# Patient Record
Sex: MALE | Race: ASIAN | NOT HISPANIC OR LATINO | URBAN - METROPOLITAN AREA
[De-identification: names, ages, dates, MRNs, and addresses within clinical notes are randomized per-mention and may not be internally consistent; named-entity substitution may affect disease eponyms.]

---

## 2018-09-13 ENCOUNTER — EMERGENCY (EMERGENCY)
Facility: HOSPITAL | Age: 73
LOS: 0 days | Discharge: HOME | End: 2018-09-14
Attending: EMERGENCY MEDICINE | Admitting: EMERGENCY MEDICINE

## 2018-09-13 VITALS
TEMPERATURE: 97 F | HEART RATE: 66 BPM | RESPIRATION RATE: 18 BRPM | DIASTOLIC BLOOD PRESSURE: 70 MMHG | OXYGEN SATURATION: 96 % | SYSTOLIC BLOOD PRESSURE: 152 MMHG

## 2018-09-13 VITALS — HEIGHT: 66.14 IN | WEIGHT: 210.1 LBS

## 2018-09-13 DIAGNOSIS — Z98.890 OTHER SPECIFIED POSTPROCEDURAL STATES: ICD-10-CM

## 2018-09-13 DIAGNOSIS — R10.9 UNSPECIFIED ABDOMINAL PAIN: ICD-10-CM

## 2018-09-13 DIAGNOSIS — N23 UNSPECIFIED RENAL COLIC: ICD-10-CM

## 2018-09-13 DIAGNOSIS — I10 ESSENTIAL (PRIMARY) HYPERTENSION: ICD-10-CM

## 2018-09-13 LAB
APTT BLD: 33.4 SEC — SIGNIFICANT CHANGE UP (ref 27–39.2)
BASOPHILS # BLD AUTO: 0.05 K/UL — SIGNIFICANT CHANGE UP (ref 0–0.2)
BASOPHILS NFR BLD AUTO: 0.3 % — SIGNIFICANT CHANGE UP (ref 0–1)
EOSINOPHIL # BLD AUTO: 0.12 K/UL — SIGNIFICANT CHANGE UP (ref 0–0.7)
EOSINOPHIL NFR BLD AUTO: 0.8 % — SIGNIFICANT CHANGE UP (ref 0–8)
HCT VFR BLD CALC: 42 % — SIGNIFICANT CHANGE UP (ref 42–52)
HGB BLD-MCNC: 14.2 G/DL — SIGNIFICANT CHANGE UP (ref 14–18)
IMM GRANULOCYTES NFR BLD AUTO: 0.5 % — HIGH (ref 0.1–0.3)
INR BLD: 1.03 RATIO — SIGNIFICANT CHANGE UP (ref 0.65–1.3)
LYMPHOCYTES # BLD AUTO: 14.6 % — LOW (ref 20.5–51.1)
LYMPHOCYTES # BLD AUTO: 2.15 K/UL — SIGNIFICANT CHANGE UP (ref 1.2–3.4)
MCHC RBC-ENTMCNC: 27.4 PG — SIGNIFICANT CHANGE UP (ref 27–31)
MCHC RBC-ENTMCNC: 33.8 G/DL — SIGNIFICANT CHANGE UP (ref 32–37)
MCV RBC AUTO: 81.1 FL — SIGNIFICANT CHANGE UP (ref 80–94)
MONOCYTES # BLD AUTO: 0.59 K/UL — SIGNIFICANT CHANGE UP (ref 0.1–0.6)
MONOCYTES NFR BLD AUTO: 4 % — SIGNIFICANT CHANGE UP (ref 1.7–9.3)
NEUTROPHILS # BLD AUTO: 11.77 K/UL — HIGH (ref 1.4–6.5)
NEUTROPHILS NFR BLD AUTO: 79.8 % — HIGH (ref 42.2–75.2)
NRBC # BLD: 0 /100 WBCS — SIGNIFICANT CHANGE UP (ref 0–0)
PLATELET # BLD AUTO: 216 K/UL — SIGNIFICANT CHANGE UP (ref 130–400)
PROTHROM AB SERPL-ACNC: 11.1 SEC — SIGNIFICANT CHANGE UP (ref 9.95–12.87)
RBC # BLD: 5.18 M/UL — SIGNIFICANT CHANGE UP (ref 4.7–6.1)
RBC # FLD: 13.8 % — SIGNIFICANT CHANGE UP (ref 11.5–14.5)
WBC # BLD: 14.76 K/UL — HIGH (ref 4.8–10.8)
WBC # FLD AUTO: 14.76 K/UL — HIGH (ref 4.8–10.8)

## 2018-09-13 RX ORDER — MORPHINE SULFATE 50 MG/1
4 CAPSULE, EXTENDED RELEASE ORAL ONCE
Qty: 0 | Refills: 0 | Status: DISCONTINUED | OUTPATIENT
Start: 2018-09-13 | End: 2018-09-13

## 2018-09-13 RX ADMIN — MORPHINE SULFATE 4 MILLIGRAM(S): 50 CAPSULE, EXTENDED RELEASE ORAL at 23:00

## 2018-09-14 DIAGNOSIS — Z98.890 OTHER SPECIFIED POSTPROCEDURAL STATES: Chronic | ICD-10-CM

## 2018-09-14 LAB
ALBUMIN SERPL ELPH-MCNC: 4.7 G/DL — SIGNIFICANT CHANGE UP (ref 3.5–5.2)
ALP SERPL-CCNC: 60 U/L — SIGNIFICANT CHANGE UP (ref 30–115)
ALT FLD-CCNC: 30 U/L — SIGNIFICANT CHANGE UP (ref 0–41)
ANION GAP SERPL CALC-SCNC: 15 MMOL/L — HIGH (ref 7–14)
APPEARANCE UR: ABNORMAL
AST SERPL-CCNC: 25 U/L — SIGNIFICANT CHANGE UP (ref 0–41)
BILIRUB SERPL-MCNC: 0.5 MG/DL — SIGNIFICANT CHANGE UP (ref 0.2–1.2)
BILIRUB UR-MCNC: NEGATIVE — SIGNIFICANT CHANGE UP
BLD GP AB SCN SERPL QL: SIGNIFICANT CHANGE UP
BUN SERPL-MCNC: 23 MG/DL — HIGH (ref 10–20)
CALCIUM SERPL-MCNC: 9.2 MG/DL — SIGNIFICANT CHANGE UP (ref 8.5–10.1)
CHLORIDE SERPL-SCNC: 99 MMOL/L — SIGNIFICANT CHANGE UP (ref 98–110)
CO2 SERPL-SCNC: 26 MMOL/L — SIGNIFICANT CHANGE UP (ref 17–32)
COLOR SPEC: YELLOW — SIGNIFICANT CHANGE UP
CREAT SERPL-MCNC: 1.1 MG/DL — SIGNIFICANT CHANGE UP (ref 0.7–1.5)
DIFF PNL FLD: ABNORMAL
GLUCOSE SERPL-MCNC: 159 MG/DL — HIGH (ref 70–99)
GLUCOSE UR QL: NEGATIVE MG/DL — SIGNIFICANT CHANGE UP
KETONES UR-MCNC: NEGATIVE — SIGNIFICANT CHANGE UP
LEUKOCYTE ESTERASE UR-ACNC: NEGATIVE — SIGNIFICANT CHANGE UP
NITRITE UR-MCNC: NEGATIVE — SIGNIFICANT CHANGE UP
PH UR: 7.5 — SIGNIFICANT CHANGE UP (ref 5–8)
POTASSIUM SERPL-MCNC: 3.7 MMOL/L — SIGNIFICANT CHANGE UP (ref 3.5–5)
POTASSIUM SERPL-SCNC: 3.7 MMOL/L — SIGNIFICANT CHANGE UP (ref 3.5–5)
PROT SERPL-MCNC: 7.5 G/DL — SIGNIFICANT CHANGE UP (ref 6–8)
PROT UR-MCNC: NEGATIVE MG/DL — SIGNIFICANT CHANGE UP
RBC CASTS # UR COMP ASSIST: ABNORMAL /HPF
SODIUM SERPL-SCNC: 140 MMOL/L — SIGNIFICANT CHANGE UP (ref 135–146)
SP GR SPEC: 1.02 — SIGNIFICANT CHANGE UP (ref 1.01–1.03)
TYPE + AB SCN PNL BLD: SIGNIFICANT CHANGE UP
UROBILINOGEN FLD QL: 0.2 MG/DL — SIGNIFICANT CHANGE UP (ref 0.2–0.2)

## 2018-09-14 RX ORDER — TAMSULOSIN HYDROCHLORIDE 0.4 MG/1
1 CAPSULE ORAL
Qty: 7 | Refills: 0 | OUTPATIENT
Start: 2018-09-14 | End: 2018-09-20

## 2018-09-14 RX ORDER — TAMSULOSIN HYDROCHLORIDE 0.4 MG/1
0.4 CAPSULE ORAL AT BEDTIME
Qty: 0 | Refills: 0 | Status: DISCONTINUED | OUTPATIENT
Start: 2018-09-14 | End: 2018-09-14

## 2018-09-14 RX ORDER — KETOROLAC TROMETHAMINE 30 MG/ML
15 SYRINGE (ML) INJECTION ONCE
Qty: 0 | Refills: 0 | Status: COMPLETED | OUTPATIENT
Start: 2018-09-14 | End: 2018-09-14

## 2018-09-14 RX ORDER — IBUPROFEN 200 MG
1 TABLET ORAL
Qty: 15 | Refills: 0
Start: 2018-09-14

## 2018-09-14 RX ADMIN — TAMSULOSIN HYDROCHLORIDE 0.4 MILLIGRAM(S): 0.4 CAPSULE ORAL at 01:30

## 2018-09-14 NOTE — ED PROVIDER NOTE - OBJECTIVE STATEMENT
74 yo male hx of HTN and Aortic dissection present c/o sudden onset of left flank pain started around 8 pm. pain is sharp and localized to left lower flank. denies similar sxs in the past. denies injury and heavy lifting. denies dysuria/urgency/frequency. denies fever/chill/HA/dizziness/chest pain/palpitation/sob/abd pain/n/v/d/ black stool/bloody stool

## 2018-09-14 NOTE — ED PROVIDER NOTE - PHYSICAL EXAMINATION
CONSTITUTIONAL: Well-appearing; well-nourished; in no apparent distress.   EYES: PERRL; EOM intact.   ENT: normal nose; no rhinorrhea; normal pharynx with no tonsillar hypertrophy.   NECK: Supple; non-tender; no cervical lymphadenopathy. No JVD.   CARDIOVASCULAR: Normal S1, S2; no murmurs, rubs, or gallops.   RESPIRATORY: Normal chest excursion with respiration; breath sounds clear and equal bilaterally; no wheezes, rhonchi, or rales.  GI/: + left CVAT. Normal bowel sounds; non-distended; non-tender; no palpable organomegaly.   MS: No evidence of trauma or deformity. Non-tender to palpation. No scoliosis. Normal ROM in all four extremities; non-tender to palpation; distal pulses are normal.   SKIN: Normal for age and race; warm; dry; good turgor; no apparent lesions or exudate.   NEURO/PSYCH: A & O x 4; grossly unremarkable. mood and manner are appropriate. Grooming and personal hygiene are appropriate.

## 2018-09-14 NOTE — ED ADULT NURSE NOTE - NSIMPLEMENTINTERV_GEN_ALL_ED
Implemented All Universal Safety Interventions:  [place] to call system. Call bell, personal items and telephone within reach. Instruct patient to call for assistance. Room bathroom lighting operational. Non-slip footwear when patient is off stretcher. Physically safe environment: no spills, clutter or unnecessary equipment. Stretcher in lowest position, wheels locked, appropriate side rails in place.

## 2018-09-14 NOTE — ED PROVIDER NOTE - MEDICAL DECISION MAKING DETAILS
Patient presented with acute left flank pain. ruled out dissection given patient's hx, CTA did not show any significant changes. Patient is closely monitored as outpatient for this dissection. CTA did reveal a left renal stone. UA clean, creatinine unremarkable. Patient's pain controlled in ED. He agrees to follow up with his PMD. HD stable at discharge. Patient presented with acute left flank pain. ruled out new dissection given patient's hx, CTA did not show any significant changes. Patient is closely monitored as outpatient for this dissection. CTA did reveal a left renal stone. UA clean, creatinine unremarkable. Patient's pain controlled in ED. He agrees to follow up with his PMD. HD stable at discharge.

## 2018-09-14 NOTE — ED PROVIDER NOTE - ATTENDING CONTRIBUTION TO CARE
73 year old male, pmhx aortic dissection s/p repair (closely monitored for this), HTN, presenting with a 1 day history of left flank pain that has been worsening. Admits to some nausea but denies fevers, headache, vision changes, weakness/numbness, confusion, URI symptoms, neck pain, chest pain, dyspnea, cough, palpitations, vomiting, abdominal pain, diarrhea, constipation, blood in stool/dark stools, urinary symptoms, penile discharge/testicular pain, leg swelling, rash, recent travel or sick contacts.    Vital Signs: I have reviewed the initial vital signs.  Constitutional: NAD, well-nourished, appears stated age, no acute distress.  HEENT: Airway patent, moist MM, no erythema/swelling/deformity of oral structures. EOMI, PERRLA.  CV: regular rate, regular rhythm, well-perfused extremities, 2+ b/l DP and radial pulses equal.  Lungs: BCTA, no increased WOB.  ABD: NTND, no guarding or rebound, no pulsatile mass, no hernias.   MSK: Neck supple, nontender, nl ROM, no stepoff. Chest nontender. Back nontender in TLS spine or to b/l bony structures or flanks. Ext nontender, nl rom, no deformity.   INTEG: Skin warm, dry, no rash.  NEURO: A&Ox3, CN II-XII intact, normal strength 5/5 all 4 ext, nl sensation throughout, normal speech and coordination.  PSYCH: Calm, cooperative, normal affect and interaction.    Given patient's high risk for aortic dissection and sudden onset left flank pain, patient sent immediately to CTA to rule out dissection. Will also get blood work, urine, evaluate for stone on CT as well.

## 2018-09-14 NOTE — ED PROVIDER NOTE - NS ED ROS FT
Constitutional: no fever, chills, no recent weight loss, change in appetite or malaise  Eyes: no redness/discharge/pain/vision changes  ENT: no rhinorrhea/ear pain/sore throat  Cardiac: No chest pain, SOB or edema.  Respiratory: No cough or respiratory distress  GI: No nausea, vomiting, diarrhea or abdominal pain.  : + flank pain. No dysuria, frequency, urgency or hematuria  MS: no pain to back or extremities, no loss of ROM, no weakness  Neuro: No headache or weakness. No LOC.  Skin: No skin rash.  Endocrine: No history of thyroid disease or diabetes.  Except as documented in the HPI, all other systems are negative.

## 2023-05-12 ENCOUNTER — INPATIENT (INPATIENT)
Facility: HOSPITAL | Age: 78
LOS: 7 days | Discharge: HOME | DRG: 377 | End: 2023-05-20
Attending: INTERNAL MEDICINE | Admitting: INTERNAL MEDICINE
Payer: MEDICARE

## 2023-05-12 VITALS
RESPIRATION RATE: 17 BRPM | SYSTOLIC BLOOD PRESSURE: 97 MMHG | DIASTOLIC BLOOD PRESSURE: 54 MMHG | OXYGEN SATURATION: 96 % | TEMPERATURE: 98 F | HEART RATE: 71 BPM

## 2023-05-12 DIAGNOSIS — K92.2 GASTROINTESTINAL HEMORRHAGE, UNSPECIFIED: ICD-10-CM

## 2023-05-12 DIAGNOSIS — Z98.890 OTHER SPECIFIED POSTPROCEDURAL STATES: Chronic | ICD-10-CM

## 2023-05-12 LAB
ABO RH CONFIRMATION: SIGNIFICANT CHANGE UP
ALBUMIN SERPL ELPH-MCNC: 3.4 G/DL — LOW (ref 3.5–5.2)
ALBUMIN SERPL ELPH-MCNC: 3.5 G/DL — SIGNIFICANT CHANGE UP (ref 3.5–5.2)
ALP SERPL-CCNC: 51 U/L — SIGNIFICANT CHANGE UP (ref 30–115)
ALP SERPL-CCNC: 52 U/L — SIGNIFICANT CHANGE UP (ref 30–115)
ALT FLD-CCNC: 8 U/L — SIGNIFICANT CHANGE UP (ref 0–41)
ALT FLD-CCNC: 9 U/L — SIGNIFICANT CHANGE UP (ref 0–41)
ANION GAP SERPL CALC-SCNC: 10 MMOL/L — SIGNIFICANT CHANGE UP (ref 7–14)
ANION GAP SERPL CALC-SCNC: 8 MMOL/L — SIGNIFICANT CHANGE UP (ref 7–14)
APTT BLD: 29.5 SEC — SIGNIFICANT CHANGE UP (ref 27–39.2)
AST SERPL-CCNC: 11 U/L — SIGNIFICANT CHANGE UP (ref 0–41)
AST SERPL-CCNC: 11 U/L — SIGNIFICANT CHANGE UP (ref 0–41)
BASOPHILS # BLD AUTO: 0.03 K/UL — SIGNIFICANT CHANGE UP (ref 0–0.2)
BASOPHILS # BLD AUTO: 0.03 K/UL — SIGNIFICANT CHANGE UP (ref 0–0.2)
BASOPHILS # BLD AUTO: 0.04 K/UL — SIGNIFICANT CHANGE UP (ref 0–0.2)
BASOPHILS NFR BLD AUTO: 0.5 % — SIGNIFICANT CHANGE UP (ref 0–1)
BASOPHILS NFR BLD AUTO: 0.5 % — SIGNIFICANT CHANGE UP (ref 0–1)
BASOPHILS NFR BLD AUTO: 0.6 % — SIGNIFICANT CHANGE UP (ref 0–1)
BILIRUB SERPL-MCNC: 0.5 MG/DL — SIGNIFICANT CHANGE UP (ref 0.2–1.2)
BILIRUB SERPL-MCNC: 0.7 MG/DL — SIGNIFICANT CHANGE UP (ref 0.2–1.2)
BLD GP AB SCN SERPL QL: SIGNIFICANT CHANGE UP
BUN SERPL-MCNC: 33 MG/DL — HIGH (ref 10–20)
BUN SERPL-MCNC: 34 MG/DL — HIGH (ref 10–20)
CALCIUM SERPL-MCNC: 7.5 MG/DL — LOW (ref 8.4–10.5)
CALCIUM SERPL-MCNC: 8.1 MG/DL — LOW (ref 8.4–10.5)
CHLORIDE SERPL-SCNC: 105 MMOL/L — SIGNIFICANT CHANGE UP (ref 98–110)
CHLORIDE SERPL-SCNC: 110 MMOL/L — SIGNIFICANT CHANGE UP (ref 98–110)
CO2 SERPL-SCNC: 21 MMOL/L — SIGNIFICANT CHANGE UP (ref 17–32)
CO2 SERPL-SCNC: 25 MMOL/L — SIGNIFICANT CHANGE UP (ref 17–32)
CREAT SERPL-MCNC: 0.8 MG/DL — SIGNIFICANT CHANGE UP (ref 0.7–1.5)
CREAT SERPL-MCNC: 1 MG/DL — SIGNIFICANT CHANGE UP (ref 0.7–1.5)
EGFR: 78 ML/MIN/1.73M2 — SIGNIFICANT CHANGE UP
EGFR: 92 ML/MIN/1.73M2 — SIGNIFICANT CHANGE UP
EOSINOPHIL # BLD AUTO: 0.03 K/UL — SIGNIFICANT CHANGE UP (ref 0–0.7)
EOSINOPHIL # BLD AUTO: 0.06 K/UL — SIGNIFICANT CHANGE UP (ref 0–0.7)
EOSINOPHIL # BLD AUTO: 0.23 K/UL — SIGNIFICANT CHANGE UP (ref 0–0.7)
EOSINOPHIL NFR BLD AUTO: 0.4 % — SIGNIFICANT CHANGE UP (ref 0–8)
EOSINOPHIL NFR BLD AUTO: 1 % — SIGNIFICANT CHANGE UP (ref 0–8)
EOSINOPHIL NFR BLD AUTO: 3.5 % — SIGNIFICANT CHANGE UP (ref 0–8)
ETHANOL SERPL-MCNC: <10 MG/DL — SIGNIFICANT CHANGE UP
GLUCOSE SERPL-MCNC: 109 MG/DL — HIGH (ref 70–99)
GLUCOSE SERPL-MCNC: 120 MG/DL — HIGH (ref 70–99)
HCT VFR BLD CALC: 25.1 % — LOW (ref 42–52)
HCT VFR BLD CALC: 27.2 % — LOW (ref 42–52)
HCT VFR BLD CALC: 27.4 % — LOW (ref 42–52)
HCT VFR BLD CALC: 28.5 % — LOW (ref 42–52)
HGB BLD-MCNC: 7.3 G/DL — LOW (ref 14–18)
HGB BLD-MCNC: 8.4 G/DL — LOW (ref 14–18)
HGB BLD-MCNC: 8.6 G/DL — LOW (ref 14–18)
HGB BLD-MCNC: 8.6 G/DL — LOW (ref 14–18)
IMM GRANULOCYTES NFR BLD AUTO: 0.3 % — SIGNIFICANT CHANGE UP (ref 0.1–0.3)
IMM GRANULOCYTES NFR BLD AUTO: 0.3 % — SIGNIFICANT CHANGE UP (ref 0.1–0.3)
IMM GRANULOCYTES NFR BLD AUTO: 0.5 % — HIGH (ref 0.1–0.3)
INR BLD: 1.25 RATIO — SIGNIFICANT CHANGE UP (ref 0.65–1.3)
LACTATE SERPL-SCNC: 0.9 MMOL/L — SIGNIFICANT CHANGE UP (ref 0.7–2)
LIDOCAIN IGE QN: 45 U/L — SIGNIFICANT CHANGE UP (ref 7–60)
LYMPHOCYTES # BLD AUTO: 0.88 K/UL — LOW (ref 1.2–3.4)
LYMPHOCYTES # BLD AUTO: 0.93 K/UL — LOW (ref 1.2–3.4)
LYMPHOCYTES # BLD AUTO: 1.56 K/UL — SIGNIFICANT CHANGE UP (ref 1.2–3.4)
LYMPHOCYTES # BLD AUTO: 12.7 % — LOW (ref 20.5–51.1)
LYMPHOCYTES # BLD AUTO: 15.8 % — LOW (ref 20.5–51.1)
LYMPHOCYTES # BLD AUTO: 23.6 % — SIGNIFICANT CHANGE UP (ref 20.5–51.1)
MAGNESIUM SERPL-MCNC: 1.9 MG/DL — SIGNIFICANT CHANGE UP (ref 1.8–2.4)
MCHC RBC-ENTMCNC: 20 PG — LOW (ref 27–31)
MCHC RBC-ENTMCNC: 21.9 PG — LOW (ref 27–31)
MCHC RBC-ENTMCNC: 22.2 PG — LOW (ref 27–31)
MCHC RBC-ENTMCNC: 22.6 PG — LOW (ref 27–31)
MCHC RBC-ENTMCNC: 29.1 G/DL — LOW (ref 32–37)
MCHC RBC-ENTMCNC: 30.2 G/DL — LOW (ref 32–37)
MCHC RBC-ENTMCNC: 30.9 G/DL — LOW (ref 32–37)
MCHC RBC-ENTMCNC: 31.4 G/DL — LOW (ref 32–37)
MCV RBC AUTO: 68.8 FL — LOW (ref 80–94)
MCV RBC AUTO: 72 FL — LOW (ref 80–94)
MCV RBC AUTO: 72.1 FL — LOW (ref 80–94)
MCV RBC AUTO: 72.7 FL — LOW (ref 80–94)
MONOCYTES # BLD AUTO: 0.46 K/UL — SIGNIFICANT CHANGE UP (ref 0.1–0.6)
MONOCYTES # BLD AUTO: 0.53 K/UL — SIGNIFICANT CHANGE UP (ref 0.1–0.6)
MONOCYTES # BLD AUTO: 0.58 K/UL — SIGNIFICANT CHANGE UP (ref 0.1–0.6)
MONOCYTES NFR BLD AUTO: 7.7 % — SIGNIFICANT CHANGE UP (ref 1.7–9.3)
MONOCYTES NFR BLD AUTO: 7.8 % — SIGNIFICANT CHANGE UP (ref 1.7–9.3)
MONOCYTES NFR BLD AUTO: 8.8 % — SIGNIFICANT CHANGE UP (ref 1.7–9.3)
NEUTROPHILS # BLD AUTO: 4.19 K/UL — SIGNIFICANT CHANGE UP (ref 1.4–6.5)
NEUTROPHILS # BLD AUTO: 4.39 K/UL — SIGNIFICANT CHANGE UP (ref 1.4–6.5)
NEUTROPHILS # BLD AUTO: 5.41 K/UL — SIGNIFICANT CHANGE UP (ref 1.4–6.5)
NEUTROPHILS NFR BLD AUTO: 63.1 % — SIGNIFICANT CHANGE UP (ref 42.2–75.2)
NEUTROPHILS NFR BLD AUTO: 74.6 % — SIGNIFICANT CHANGE UP (ref 42.2–75.2)
NEUTROPHILS NFR BLD AUTO: 78.3 % — HIGH (ref 42.2–75.2)
NRBC # BLD: 0 /100 WBCS — SIGNIFICANT CHANGE UP (ref 0–0)
NT-PROBNP SERPL-SCNC: 364 PG/ML — HIGH (ref 0–300)
PLATELET # BLD AUTO: 178 K/UL — SIGNIFICANT CHANGE UP (ref 130–400)
PLATELET # BLD AUTO: 178 K/UL — SIGNIFICANT CHANGE UP (ref 130–400)
PLATELET # BLD AUTO: 190 K/UL — SIGNIFICANT CHANGE UP (ref 130–400)
PLATELET # BLD AUTO: 207 K/UL — SIGNIFICANT CHANGE UP (ref 130–400)
PMV BLD: 9.1 FL — SIGNIFICANT CHANGE UP (ref 7.4–10.4)
PMV BLD: 9.2 FL — SIGNIFICANT CHANGE UP (ref 7.4–10.4)
PMV BLD: 9.3 FL — SIGNIFICANT CHANGE UP (ref 7.4–10.4)
PMV BLD: 9.3 FL — SIGNIFICANT CHANGE UP (ref 7.4–10.4)
POTASSIUM SERPL-MCNC: 3.6 MMOL/L — SIGNIFICANT CHANGE UP (ref 3.5–5)
POTASSIUM SERPL-MCNC: 4.1 MMOL/L — SIGNIFICANT CHANGE UP (ref 3.5–5)
POTASSIUM SERPL-SCNC: 3.6 MMOL/L — SIGNIFICANT CHANGE UP (ref 3.5–5)
POTASSIUM SERPL-SCNC: 4.1 MMOL/L — SIGNIFICANT CHANGE UP (ref 3.5–5)
PROT SERPL-MCNC: 5.4 G/DL — LOW (ref 6–8)
PROT SERPL-MCNC: 5.5 G/DL — LOW (ref 6–8)
PROTHROM AB SERPL-ACNC: 14.4 SEC — HIGH (ref 9.95–12.87)
RBC # BLD: 3.65 M/UL — LOW (ref 4.7–6.1)
RBC # BLD: 3.78 M/UL — LOW (ref 4.7–6.1)
RBC # BLD: 3.8 M/UL — LOW (ref 4.7–6.1)
RBC # BLD: 3.92 M/UL — LOW (ref 4.7–6.1)
RBC # FLD: 19 % — HIGH (ref 11.5–14.5)
RBC # FLD: 21.6 % — HIGH (ref 11.5–14.5)
RBC # FLD: 21.7 % — HIGH (ref 11.5–14.5)
RBC # FLD: 21.9 % — HIGH (ref 11.5–14.5)
SODIUM SERPL-SCNC: 139 MMOL/L — SIGNIFICANT CHANGE UP (ref 135–146)
SODIUM SERPL-SCNC: 140 MMOL/L — SIGNIFICANT CHANGE UP (ref 135–146)
TROPONIN T SERPL-MCNC: <0.01 NG/ML — SIGNIFICANT CHANGE UP
TSH SERPL-MCNC: 1.3 UIU/ML — SIGNIFICANT CHANGE UP (ref 0.27–4.2)
WBC # BLD: 5.24 K/UL — SIGNIFICANT CHANGE UP (ref 4.8–10.8)
WBC # BLD: 5.89 K/UL — SIGNIFICANT CHANGE UP (ref 4.8–10.8)
WBC # BLD: 6.62 K/UL — SIGNIFICANT CHANGE UP (ref 4.8–10.8)
WBC # BLD: 6.91 K/UL — SIGNIFICANT CHANGE UP (ref 4.8–10.8)
WBC # FLD AUTO: 5.24 K/UL — SIGNIFICANT CHANGE UP (ref 4.8–10.8)
WBC # FLD AUTO: 5.89 K/UL — SIGNIFICANT CHANGE UP (ref 4.8–10.8)
WBC # FLD AUTO: 6.62 K/UL — SIGNIFICANT CHANGE UP (ref 4.8–10.8)
WBC # FLD AUTO: 6.91 K/UL — SIGNIFICANT CHANGE UP (ref 4.8–10.8)

## 2023-05-12 PROCEDURE — 71275 CT ANGIOGRAPHY CHEST: CPT | Mod: 26,MA

## 2023-05-12 PROCEDURE — 83880 ASSAY OF NATRIURETIC PEPTIDE: CPT

## 2023-05-12 PROCEDURE — 99291 CRITICAL CARE FIRST HOUR: CPT

## 2023-05-12 PROCEDURE — 80053 COMPREHEN METABOLIC PANEL: CPT

## 2023-05-12 PROCEDURE — 86803 HEPATITIS C AB TEST: CPT

## 2023-05-12 PROCEDURE — 93970 EXTREMITY STUDY: CPT

## 2023-05-12 PROCEDURE — 36415 COLL VENOUS BLD VENIPUNCTURE: CPT

## 2023-05-12 PROCEDURE — 84145 PROCALCITONIN (PCT): CPT

## 2023-05-12 PROCEDURE — 83605 ASSAY OF LACTIC ACID: CPT

## 2023-05-12 PROCEDURE — 85027 COMPLETE CBC AUTOMATED: CPT

## 2023-05-12 PROCEDURE — 82962 GLUCOSE BLOOD TEST: CPT

## 2023-05-12 PROCEDURE — 86901 BLOOD TYPING SEROLOGIC RH(D): CPT

## 2023-05-12 PROCEDURE — 84484 ASSAY OF TROPONIN QUANT: CPT

## 2023-05-12 PROCEDURE — 99221 1ST HOSP IP/OBS SF/LOW 40: CPT

## 2023-05-12 PROCEDURE — 93010 ELECTROCARDIOGRAM REPORT: CPT

## 2023-05-12 PROCEDURE — 84443 ASSAY THYROID STIM HORMONE: CPT

## 2023-05-12 PROCEDURE — 94760 N-INVAS EAR/PLS OXIMETRY 1: CPT

## 2023-05-12 PROCEDURE — 70486 CT MAXILLOFACIAL W/O DYE: CPT | Mod: 26,MA

## 2023-05-12 PROCEDURE — 72170 X-RAY EXAM OF PELVIS: CPT | Mod: 26

## 2023-05-12 PROCEDURE — 71045 X-RAY EXAM CHEST 1 VIEW: CPT

## 2023-05-12 PROCEDURE — 99223 1ST HOSP IP/OBS HIGH 75: CPT

## 2023-05-12 PROCEDURE — 99222 1ST HOSP IP/OBS MODERATE 55: CPT

## 2023-05-12 PROCEDURE — 71045 X-RAY EXAM CHEST 1 VIEW: CPT | Mod: 26

## 2023-05-12 PROCEDURE — 97162 PT EVAL MOD COMPLEX 30 MIN: CPT | Mod: GP

## 2023-05-12 PROCEDURE — 86923 COMPATIBILITY TEST ELECTRIC: CPT

## 2023-05-12 PROCEDURE — 72125 CT NECK SPINE W/O DYE: CPT | Mod: 26,MA

## 2023-05-12 PROCEDURE — 80048 BASIC METABOLIC PNL TOTAL CA: CPT

## 2023-05-12 PROCEDURE — 86850 RBC ANTIBODY SCREEN: CPT

## 2023-05-12 PROCEDURE — 85730 THROMBOPLASTIN TIME PARTIAL: CPT

## 2023-05-12 PROCEDURE — 93306 TTE W/DOPPLER COMPLETE: CPT

## 2023-05-12 PROCEDURE — 86900 BLOOD TYPING SEROLOGIC ABO: CPT

## 2023-05-12 PROCEDURE — 83735 ASSAY OF MAGNESIUM: CPT

## 2023-05-12 PROCEDURE — 85025 COMPLETE CBC W/AUTO DIFF WBC: CPT

## 2023-05-12 PROCEDURE — C9113: CPT

## 2023-05-12 PROCEDURE — 94640 AIRWAY INHALATION TREATMENT: CPT

## 2023-05-12 PROCEDURE — 93306 TTE W/DOPPLER COMPLETE: CPT | Mod: 26

## 2023-05-12 PROCEDURE — 70450 CT HEAD/BRAIN W/O DYE: CPT | Mod: 26,MA

## 2023-05-12 PROCEDURE — 74174 CTA ABD&PLVS W/CONTRAST: CPT | Mod: 26,MA

## 2023-05-12 RX ORDER — PANTOPRAZOLE SODIUM 20 MG/1
8 TABLET, DELAYED RELEASE ORAL
Qty: 80 | Refills: 0 | Status: DISCONTINUED | OUTPATIENT
Start: 2023-05-12 | End: 2023-05-12

## 2023-05-12 RX ORDER — ONDANSETRON 8 MG/1
4 TABLET, FILM COATED ORAL ONCE
Refills: 0 | Status: COMPLETED | OUTPATIENT
Start: 2023-05-12 | End: 2023-05-12

## 2023-05-12 RX ORDER — FERROUS SULFATE 325(65) MG
325 TABLET ORAL DAILY
Refills: 0 | Status: DISCONTINUED | OUTPATIENT
Start: 2023-05-12 | End: 2023-05-20

## 2023-05-12 RX ORDER — SPIRONOLACTONE 25 MG/1
12.5 TABLET, FILM COATED ORAL DAILY
Refills: 0 | Status: DISCONTINUED | OUTPATIENT
Start: 2023-05-12 | End: 2023-05-12

## 2023-05-12 RX ORDER — ATORVASTATIN CALCIUM 80 MG/1
20 TABLET, FILM COATED ORAL AT BEDTIME
Refills: 0 | Status: DISCONTINUED | OUTPATIENT
Start: 2023-05-12 | End: 2023-05-20

## 2023-05-12 RX ORDER — TAMSULOSIN HYDROCHLORIDE 0.4 MG/1
0.4 CAPSULE ORAL AT BEDTIME
Refills: 0 | Status: DISCONTINUED | OUTPATIENT
Start: 2023-05-12 | End: 2023-05-20

## 2023-05-12 RX ORDER — PROTHROMBIN COMPLEX CONCENTRATE (HUMAN) 25.5; 16.5; 24; 22; 22; 26 [IU]/ML; [IU]/ML; [IU]/ML; [IU]/ML; [IU]/ML; [IU]/ML
2500 POWDER, FOR SOLUTION INTRAVENOUS ONCE
Refills: 0 | Status: DISCONTINUED | OUTPATIENT
Start: 2023-05-12 | End: 2023-05-12

## 2023-05-12 RX ORDER — PANTOPRAZOLE SODIUM 20 MG/1
40 TABLET, DELAYED RELEASE ORAL EVERY 12 HOURS
Refills: 0 | Status: DISCONTINUED | OUTPATIENT
Start: 2023-05-12 | End: 2023-05-18

## 2023-05-12 RX ORDER — MONTELUKAST 4 MG/1
10 TABLET, CHEWABLE ORAL DAILY
Refills: 0 | Status: DISCONTINUED | OUTPATIENT
Start: 2023-05-12 | End: 2023-05-20

## 2023-05-12 RX ORDER — METOCLOPRAMIDE HCL 10 MG
10 TABLET ORAL ONCE
Refills: 0 | Status: COMPLETED | OUTPATIENT
Start: 2023-05-12 | End: 2023-05-12

## 2023-05-12 RX ORDER — SODIUM CHLORIDE 9 MG/ML
1000 INJECTION, SOLUTION INTRAVENOUS
Refills: 0 | Status: DISCONTINUED | OUTPATIENT
Start: 2023-05-12 | End: 2023-05-15

## 2023-05-12 RX ORDER — OCTREOTIDE ACETATE 200 UG/ML
8 INJECTION, SOLUTION INTRAVENOUS; SUBCUTANEOUS
Qty: 500 | Refills: 0 | Status: DISCONTINUED | OUTPATIENT
Start: 2023-05-12 | End: 2023-05-12

## 2023-05-12 RX ORDER — FUROSEMIDE 40 MG
20 TABLET ORAL DAILY
Refills: 0 | Status: DISCONTINUED | OUTPATIENT
Start: 2023-05-12 | End: 2023-05-12

## 2023-05-12 RX ORDER — ERYTHROMYCIN ETHYLSUCCINATE 400 MG
1000 TABLET ORAL ONCE
Refills: 0 | Status: DISCONTINUED | OUTPATIENT
Start: 2023-05-12 | End: 2023-05-12

## 2023-05-12 RX ORDER — PANTOPRAZOLE SODIUM 20 MG/1
80 TABLET, DELAYED RELEASE ORAL ONCE
Refills: 0 | Status: COMPLETED | OUTPATIENT
Start: 2023-05-12 | End: 2023-05-12

## 2023-05-12 RX ORDER — SACUBITRIL AND VALSARTAN 24; 26 MG/1; MG/1
1 TABLET, FILM COATED ORAL
Refills: 0 | Status: DISCONTINUED | OUTPATIENT
Start: 2023-05-12 | End: 2023-05-12

## 2023-05-12 RX ORDER — CEFTRIAXONE 500 MG/1
2000 INJECTION, POWDER, FOR SOLUTION INTRAMUSCULAR; INTRAVENOUS ONCE
Refills: 0 | Status: COMPLETED | OUTPATIENT
Start: 2023-05-12 | End: 2023-05-12

## 2023-05-12 RX ADMIN — TAMSULOSIN HYDROCHLORIDE 0.4 MILLIGRAM(S): 0.4 CAPSULE ORAL at 22:51

## 2023-05-12 RX ADMIN — ONDANSETRON 4 MILLIGRAM(S): 8 TABLET, FILM COATED ORAL at 01:27

## 2023-05-12 RX ADMIN — SODIUM CHLORIDE 75 MILLILITER(S): 9 INJECTION, SOLUTION INTRAVENOUS at 10:00

## 2023-05-12 RX ADMIN — PANTOPRAZOLE SODIUM 40 MILLIGRAM(S): 20 TABLET, DELAYED RELEASE ORAL at 09:20

## 2023-05-12 RX ADMIN — Medication 200 MILLIGRAM(S): at 22:52

## 2023-05-12 RX ADMIN — OCTREOTIDE ACETATE 1.6 MICROGRAM(S)/HR: 200 INJECTION, SOLUTION INTRAVENOUS; SUBCUTANEOUS at 02:47

## 2023-05-12 RX ADMIN — PANTOPRAZOLE SODIUM 40 MILLIGRAM(S): 20 TABLET, DELAYED RELEASE ORAL at 17:22

## 2023-05-12 RX ADMIN — Medication 200 MILLIGRAM(S): at 17:22

## 2023-05-12 RX ADMIN — Medication 104 MILLIGRAM(S): at 01:27

## 2023-05-12 RX ADMIN — Medication 325 MILLIGRAM(S): at 11:18

## 2023-05-12 RX ADMIN — Medication 200 MILLIGRAM(S): at 11:18

## 2023-05-12 RX ADMIN — PANTOPRAZOLE SODIUM 10 MG/HR: 20 TABLET, DELAYED RELEASE ORAL at 02:47

## 2023-05-12 RX ADMIN — PANTOPRAZOLE SODIUM 80 MILLIGRAM(S): 20 TABLET, DELAYED RELEASE ORAL at 01:03

## 2023-05-12 RX ADMIN — CEFTRIAXONE 100 MILLIGRAM(S): 500 INJECTION, POWDER, FOR SOLUTION INTRAMUSCULAR; INTRAVENOUS at 02:47

## 2023-05-12 RX ADMIN — Medication 10 MILLIGRAM(S): at 01:06

## 2023-05-12 RX ADMIN — ONDANSETRON 4 MILLIGRAM(S): 8 TABLET, FILM COATED ORAL at 01:06

## 2023-05-12 RX ADMIN — ATORVASTATIN CALCIUM 20 MILLIGRAM(S): 80 TABLET, FILM COATED ORAL at 22:51

## 2023-05-12 RX ADMIN — MONTELUKAST 10 MILLIGRAM(S): 4 TABLET, CHEWABLE ORAL at 11:18

## 2023-05-12 NOTE — H&P ADULT - NSICDXPASTMEDICALHX_GEN_ALL_CORE_FT
PAST MEDICAL HISTORY:  Asthma     CAD (coronary artery disease)     Chronic systolic congestive heart failure     Hypertension     S/P CABG x 1     Unspecified atrial fibrillation

## 2023-05-12 NOTE — CONSULT NOTE ADULT - ASSESSMENT
78y [place]/cantonese speaking Male w/ PMHx of afib on Eliquis and HTN (rest of history unclear) presented to the ED as Trauma Alert s/p syncopal fall. Per a nurse translating in the ED, the patient became suddenly dizzy yesterday evening and had a syncopal episode.     Vascular surgery consulted for CT finding of type B aortic dissection. History noted as above; difficult to obtain full history due to language barrier. Per nurse translating in ED, patient states that he gets dizzy whenever his blood pressure is low. He takes HTN medications, but states his blood pressure is labile, going as low as SBP of 60s and as high as SBP of 140s. Denies any history of blood clots or vascular disease. States he has had bypass surgery many years ago but does not recall for what. Denies any pain in the extremities or discoloration. Palpable peripheral pulses. .   Of note, patient has been having episodes of hemoptysis in the ED, Hgb is 7.8, received 2u PRBCs.     PLAN:  - Recommend close monitoring in ICU setting for BP management  - Strict BP control - SBP goal of <140  - No acute surgical intervention at this time   - Hold all anticoagulation in setting of possible upper GI bleed  - Recommend GI c/s for possible GI bleed  - Imaging and case discussed with vascular fellow  - Attending to see    VASCULAR SURGERY SPECTRA: [id number] 78y [place]/cantonese speaking Male w/ PMHx of afib on Eliquis and HTN (rest of history unclear) presented to the ED as Trauma Alert s/p syncopal fall. Per a nurse translating in the ED, the patient became suddenly dizzy yesterday evening and had a syncopal episode.     Vascular surgery consulted for CT finding of type B aortic dissection. History noted as above; difficult to obtain full history due to language barrier. Per nurse translating in ED, patient states that he gets dizzy whenever his blood pressure is low. He takes HTN medications, but states his blood pressure is labile, going as low as SBP of 60s and as high as SBP of 140s. Denies any history of blood clots or vascular disease. States he has had bypass surgery many years ago but does not recall for what. Denies any pain in the extremities or discoloration. Palpable peripheral pulses. .   Of note, patient has been having episodes of hemoptysis in the ED, Hgb is 7.8, received 2u PRBCs.     PLAN:  - Recommend close monitoring in ICU setting for BP management  - Strict BP control - SBP goal of <140  - No acute surgical intervention at this time   - Hold all anticoagulation in setting of possible upper GI bleed  - Recommend GI c/s for possible GI bleed  - Imaging and case discussed with vascular fellow  - Attending to see      5/12 @ 9AM:  Pt seen in AM with attending Dr. Carlton. Spoke to pt and his grandson. We gave information to pt for follow up in the office. His grandson will make an appointment to see Dr. Carlton    VASCULAR SURGERY SPECTRA: [id number]

## 2023-05-12 NOTE — PROGRESS NOTE ADULT - SUBJECTIVE AND OBJECTIVE BOX
Internal Medicine Progress Note    Location: [place] [address] [address]  Patient Name: ZAID DU  MRN: [id number]    Patient is a 78y old  Male who presents with a chief complaint of upper GI bleed (12 May 2023 11:12)      Subjective  Patient reports doing well, denies any new symptoms/complaints      Objective  Vital Signs Last 24 Hrs  T(C): 36.3 (12 May 2023 08:00), Max: 36.7 (12 May 2023 06:45)  T(F): 97.4 (12 May 2023 08:00), Max: 98 (12 May 2023 06:45)  HR: 76 (12 May 2023 13:00) (71 - 86)  BP: 138/63 (12 May 2023 08:00) (88/53 - 138/63)  BP(mean): 91 (12 May 2023 08:00) (91 - 91)  RR: 22 (12 May 2023 13:00) (17 - 23)  SpO2: 97% (12 May 2023 13:00) (93% - 99%)    Parameters below as of 12 May 2023 12:00  Patient On (Oxygen Delivery Method): room air        Intake/output   05-12-23 @ 07:01  -  05-12-23 @ 16:47  --------------------------------------------------------  IN: 600 mL / OUT: 400 mL / NET: 200 mL        Physical Exam  General: nontoxic, NAD  Neck: supple  Chest/lung: nonlabored respirations on RA, CTAB  Heart: RRR, +S1 S2  Abdomen: soft, nontender, nondistended  Extremities: no clubbing/cyanosis   Neuro: no gross focal deficits  Psych: awake alert     Labs  CBC:                       8.6    5.89  )-----------( 178      ( 12 May 2023 12:20 )             27.4     BMP: 05-12    139  |  110  |  34<H>  ----------------------------<  109<H>  4.1   |  21  |  0.8    Ca    7.5<L>      12 May 2023 07:05  Mg     1.9     05-12    TPro  5.4<L>  /  Alb  3.4<L>  /  TBili  0.7  /  DBili  x   /  AST  11  /  ALT  8   /  AlkPhos  51  05-12    Coag: PT/INR - ( 12 May 2023 00:50 )   PT: 14.40 sec;   INR: 1.25 ratio         PTT - ( 12 May 2023 00:50 )  PTT:29.5 sec  ABG:   Cardiac markers: CARDIAC MARKERS ( 12 May 2023 12:20 )  x     / <0.01 ng/mL / x     / x     / x      CARDIAC MARKERS ( 12 May 2023 07:05 )  x     / <0.01 ng/mL / x     / x     / x      CARDIAC MARKERS ( 12 May 2023 00:50 )  x     / <0.01 ng/mL / x     / x     / x            Micro:        Imaging:    Standing Medications  MEDICATIONS  (STANDING):  atorvastatin 20 milliGRAM(s) Oral at bedtime  benzonatate 200 milliGRAM(s) Oral three times a day  ferrous    sulfate 325 milliGRAM(s) Oral daily  lactated ringers. 1000 milliLiter(s) (75 mL/Hr) IV Continuous <Continuous>  montelukast 10 milliGRAM(s) Oral daily  pantoprazole  Injectable 40 milliGRAM(s) IV Push every 12 hours  tamsulosin 0.4 milliGRAM(s) Oral at bedtime    PRN Medications  MEDICATIONS  (PRN):

## 2023-05-12 NOTE — CONSULT NOTE ADULT - SUBJECTIVE AND OBJECTIVE BOX
Surgeon: Dr. Grant/Av/Nadiya    Consult requesting by:    HISTORY OF PRESENT ILLNESS:  HPI:  77yo M hx of afib on eliquis, HFrEF (unknown EF), CAD s/p CABG (8 years ago), HTN, asthma presented to the hospital for syncopal episode after feeling dizzy yesterday evening.    Patient speaks a dialect similar to Cantonese but is a village dialect. Patient is able to understand Cantonese, but 100%  will not be able to understand the patient's language. I understood 100% of patient's conversation.     Patient was feeling dizzy while walking to the bathroom at 10PM  followed by a fall. +LOC, + AC. Unsure if he had head trauma, but does not experience any headaches. Unwitnessed by family. Denies fevers, shortness of breath, chest pain, abdominal pain, diarrhea, constipation, dysuria.     Upon arrival to the ED, patient was complaining of having productive sputum needing to cough, epistaxis, and followed by ~50cc of hematemesis. Brought into hospital via EMS    At the ED, trauma alert was activated due to the fall. Dried blood found in the nares. VS was T-97.8F, BP: 97/54, HR: 71bpm, RR: 17bpm, SpO2: 96% on RA. After episode of hemoptysis, patient's SBP dropped from 89-->85. 2u prbc were ordered resulting in improvement of BP. Labs significant for microcytic anemia (Hb- 7.3), BUN- 33. troponin<0.01 x1.     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  CTA Abd+Pelvis: no acute pathology  Xray Pelvis: no acute fracture    Patient admitted to ICU for Upper GI bleed. Currently, VS stable: with SBP 110s. Given 2u prbc. started octrotide and protonix infusion. s/p ceftriaxone and erythromycin IV. given K centra 5g. Vascular surgery consulted for aortic dissection. No acute surgical intervention, Recommend keep -140 SBP.  per ED signout. GI consutled for GI Bleed. Plan for colonoscopy in today.    (12 May 2023 04:16)      NYHA functional class    [ ] Class I (no limitation) [ ] Class II (slight limitation) [ ] Class III (marked limitation) [ ] Class IV (symptoms at rest)    CCS grading of Angina Pectoris:  [ ] Class I	        Angina only during strenuous or prolonged physical activity  [ ] Class II	Slight limitation, with angina only during vigorous physical activity  [ ] Class III	Symptoms with everyday living activities, ie, moderate limitation  [ ] Class IV	Inability to perform any activity without angina or angina at rest, ie, severe limitation      PAST MEDICAL & SURGICAL HISTORY:  Hypertension      CAD (coronary artery disease)      S/P CABG x 1      Unspecified atrial fibrillation      Chronic systolic congestive heart failure      Asthma          MEDICATIONS  (STANDING):  atorvastatin 20 milliGRAM(s) Oral at bedtime  benzonatate 200 milliGRAM(s) Oral three times a day  ferrous    sulfate 325 milliGRAM(s) Oral daily  lactated ringers. 1000 milliLiter(s) (75 mL/Hr) IV Continuous <Continuous>  montelukast 10 milliGRAM(s) Oral daily  pantoprazole  Injectable 40 milliGRAM(s) IV Push every 12 hours  tamsulosin 0.4 milliGRAM(s) Oral at bedtime    MEDICATIONS  (PRN):    Antiplatelet therapy:                           Last dose/amt:    Allergies    No Known Allergies    Intolerances        SOCIAL HISTORY:  Smoker: [ ] Yes  [ ] No        PACK YEARS:                         WHEN QUIT?  ETOH use: [ ] Yes  [ ] No              FREQUENCY / QUANTITY:  Ilicit Drug use:  [ ] Yes  [ ] No  Occupation:  Lives with:  Assisted device use:  5 meter walk test: 1____sec, 2____sec, 3___sec  FAMILY HISTORY:      Review of Systems  CONSTITUTIONAL:  Fevers[ ] chills[ ] sweats[ ] fatigue[ ] weight loss[ ] weight gain [ ]            NEGATIVE [X ]   NEURO:  parathesias[ ] seizures [ ]  syncope [ ]  confusion [ ]                                                       NEGATIVE[ X]   EYES: glasses[ ]  blurry vision[ ]  discharge[ ] pain[ ] glaucoma [ ]                                                 NEGATIVE[X ]   ENMT:  difficulty hearing [ ]  vertigo[ ]  dysphagia[ ] epistaxis[ ] recent dental work [ ]           NEGATIVE[ X]   CV:  chest pain[ ] palpitations[ ] PARR [ ] diaphoresis [ ]                                                                  NEGATIVE[ X]   RESPIRATORY:  wheezing[ ] SOB[ ] cough [ ] sputum[ ] hemoptysis[ ]                                          NEGATIVE[ ]   GI:  nausea[ ]  vomiting [ ]  diarrhea[ ] constipation [ ] melena [ ]                                             NEGATIVE[ X]   : hematuria[ ]  dysuria[ ] urgency[ ] incontinence[ ]                                                                   NEGATIVE[ X]   MUSCULOSKELETAL:  arthritis[ ]  joint swelling [ ] muscle weakness [ ] Hx vein stripping [ ]   NEGATIVE[X ]   SKIN/BREAST:  rash[ ] itching [ ]  hair loss[ ] masses[ ]                                                                    NEGATIVE[ X]   PSYCH:  dementia [ ] depression [ ] anxiety[ ]                                                                                     NEGATIVE[X ]   HEME/LYMPH:  bruises easily[ ] enlarged lymph nodes[ ] tender lymph nodes[ ]                     NEGATIVE[ X]   ENDOCRINE:  cold intolerance[ ] heat intolerance[ ] polydipsia[ ]                                                NEGATIVE[ X]     PHYSICAL EXAM  Vital Signs Last 24 Hrs  T(C): 36.7 (12 May 2023 06:45), Max: 36.7 (12 May 2023 06:45)  T(F): 98 (12 May 2023 06:45), Max: 98 (12 May 2023 06:45)  HR: 79 (12 May 2023 06:45) (71 - 86)  BP: 127/71 (12 May 2023 05:30) (88/53 - 136/64)  BP(mean): --  RR: 21 (12 May 2023 06:45) (17 - 21)  SpO2: 97% (12 May 2023 08:31) (95% - 99%)    Parameters below as of 12 May 2023 08:31  Patient On (Oxygen Delivery Method): room air      Right arm bp:                       Left arm bp:    CONSTITUTIONAL:  WNL[ ]   Neuro: WNL[ ] Normal exam oriented to person/place & time with no focal motor or sensory  deficits. Other                     Eyes: WNL[ ]   Normal exam of conjunctiva & lids, pupils equally reactive. Other     ENT: WNL[ ]    Normal exam of nasal/oral mucosa with absence of cyanosis. Other  Neck: WNL[ ]  Normal exam of jugular veins, trachea & thyroid. Other  Chest: WNL[ ] Normal lung exam with good air movement absence of wheezes, rales or Ronchi                                                                             CV:  Auscultation: normal [ ] S3[ ] S4[ ] Irregular [ ] Rub[ ] Clicks[ ]    Murmurs none:[ ]systolic [ ]  diastolic [ ] holosystolic [ ]  Carotids: No Bruits[ ] Other                 Abdominal Aorta: normal [ ] nonpalpable[ ]Other                                                                                      GI: WNL[ ] Normal exam of abdomen, liver & spleen with no noted masses or tenderness. Other                                                                                                        Extremities: WNL[ ] Normal no evidence of cyanosis or deformity Edema: none[ ]trace[ ]1+[ ]2+[ ]3+[ ]4+[ ]  Lower Extremity Pulses: Right[ ] Left[ ]Varicosities[ ]  SKIN :WNL[ ] Normal exam to inspection & palpation. Other:                                                          LABS:                        8.6    6.91  )-----------( 190      ( 12 May 2023 07:05 )             28.5     05-12    139  |  110  |  34<H>  ----------------------------<  109<H>  4.1   |  21  |  0.8    Ca    7.5<L>      12 May 2023 07:05  Mg     1.9     05-12    TPro  5.4<L>  /  Alb  3.4<L>  /  TBili  0.7  /  DBili  x   /  AST  11  /  ALT  8   /  AlkPhos  51  05-12    PT/INR - ( 12 May 2023 00:50 )   PT: 14.40 sec;   INR: 1.25 ratio         PTT - ( 12 May 2023 00:50 )  PTT:29.5 sec    CARDIAC MARKERS ( 12 May 2023 07:05 )  x     / <0.01 ng/mL / x     / x     / x      CARDIAC MARKERS ( 12 May 2023 00:50 )  x     / <0.01 ng/mL / x     / x     / x              Covid Results:     Cardiac Cath:    TTE / JARETT:    Recommendation: (Procedures/Evaluations)  CT HEAD Non-Contrast:[  ]  CT Chest without contrast [ X ]  Echocadiography :[ X ]  Carotid Duplex :[ X ]  PFT : Simple PFT [ X ]  Full [ ]  OPHELIA/PVR: [ ]  Renal Consult [ ]  Pulmonary Consult: [ ]   Vascular Consult [ ]    Dental Consult [ ]   Hem-Onc Consult [ ]   GI Consult [ ]   Other Consultations :    STS Risk Score:  Discussed with patient/HCP [x ] Yes  [ ] No    Impression:    CAD [ ]  Valvular  disease [ ]   Aortic Disease [ ]   MARIAN: Yes[ ] No [ ]   CKD Stage I [ ] , Stage II [ ] , Stage III [ ], Stage IV [ ]   Anemia: Yes [ ], No [ ]  Diabetes :Yes [ ], No [ ]  Acute MI: Yes [ ], No [ ]   Heart Failure: Yes [ ] , No [ ] HFpEF [ ], HFrEF [ ]        Assessment/ Plan:                Surgeon: Dr. Grant/Av/Nadiya    Consult requesting by:    HISTORY OF PRESENT ILLNESS:  HPI:  77yo M hx of afib on eliquis, HFrEF (unknown EF), CAD s/p CABG (8 years ago), HTN, asthma presented to the hospital for syncopal episode after feeling dizzy yesterday evening.    Patient speaks a dialect similar to Cantonese but is a village dialect. Patient is able to understand Cantonese, but 100%  will not be able to understand the patient's language. I understood 100% of patient's conversation.     Patient was feeling dizzy while walking to the bathroom at 10PM  followed by a fall. +LOC, + AC. Unsure if he had head trauma, but does not experience any headaches. Unwitnessed by family. Denies fevers, shortness of breath, chest pain, abdominal pain, diarrhea, constipation, dysuria.     Upon arrival to the ED, patient was complaining of having productive sputum needing to cough, epistaxis, and followed by ~50cc of hematemesis. Brought into hospital via EMS    At the ED, trauma alert was activated due to the fall. Dried blood found in the nares. VS was T-97.8F, BP: 97/54, HR: 71bpm, RR: 17bpm, SpO2: 96% on RA. After episode of hemoptysis, patient's SBP dropped from 89-->85. 2u prbc were ordered resulting in improvement of BP. Labs significant for microcytic anemia (Hb- 7.3), BUN- 33. troponin<0.01 x1.     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  CTA Abd+Pelvis: no acute pathology  Xray Pelvis: no acute fracture    Patient admitted to ICU for Upper GI bleed. Currently, VS stable: with SBP 110s. Given 2u prbc. started octrotide and protonix infusion. s/p ceftriaxone and erythromycin IV. given K centra 5g. Vascular surgery consulted for aortic dissection. No acute surgical intervention, Recommend keep -140 SBP.  per ED signout. GI consutled for GI Bleed. Plan for colonoscopy in today.    (12 May 2023 04:16)      NYHA functional class    [ ] Class I (no limitation) [ ] Class II (slight limitation) [ ] Class III (marked limitation) [ ] Class IV (symptoms at rest)    CCS grading of Angina Pectoris:  [ ] Class I	        Angina only during strenuous or prolonged physical activity  [ ] Class II	Slight limitation, with angina only during vigorous physical activity  [ ] Class III	Symptoms with everyday living activities, ie, moderate limitation  [ ] Class IV	Inability to perform any activity without angina or angina at rest, ie, severe limitation      PAST MEDICAL & SURGICAL HISTORY:  Hypertension      CAD (coronary artery disease)      S/P CABG x 1      Unspecified atrial fibrillation      Chronic systolic congestive heart failure      Asthma          MEDICATIONS  (STANDING):  atorvastatin 20 milliGRAM(s) Oral at bedtime  benzonatate 200 milliGRAM(s) Oral three times a day  ferrous    sulfate 325 milliGRAM(s) Oral daily  lactated ringers. 1000 milliLiter(s) (75 mL/Hr) IV Continuous <Continuous>  montelukast 10 milliGRAM(s) Oral daily  pantoprazole  Injectable 40 milliGRAM(s) IV Push every 12 hours  tamsulosin 0.4 milliGRAM(s) Oral at bedtime    MEDICATIONS  (PRN):    Antiplatelet therapy:                           Last dose/amt:    Allergies    No Known Allergies    Intolerances        SOCIAL HISTORY:  Smoker: [ ] Yes  [ ] No        PACK YEARS:                         WHEN QUIT?  ETOH use: [ ] Yes  [ ] No              FREQUENCY / QUANTITY:  Ilicit Drug use:  [ ] Yes  [ ] No  Occupation:  Lives with:  Assisted device use:  5 meter walk test: 1____sec, 2____sec, 3___sec  FAMILY HISTORY:      Review of Systems  CONSTITUTIONAL:  Fevers[ ] chills[ ] sweats[ ] fatigue[ ] weight loss[ ] weight gain [ ]            NEGATIVE [X ]   NEURO:  parathesias[ ] seizures [ ]  syncope [ ]  confusion [ ]                                                       NEGATIVE[ X]   EYES: glasses[ ]  blurry vision[ ]  discharge[ ] pain[ ] glaucoma [ ]                                                 NEGATIVE[X ]   ENMT:  difficulty hearing [ ]  vertigo[ ]  dysphagia[ ] epistaxis[ ] recent dental work [ ]           NEGATIVE[ X]   CV:  chest pain[ ] palpitations[ ] PARR [ ] diaphoresis [ ]                                                                  NEGATIVE[ X]   RESPIRATORY:  wheezing[ ] SOB[ ] cough [ ] sputum[ ] hemoptysis[ ]                                          NEGATIVE[ ]   GI:  nausea[ ]  vomiting [ ]  diarrhea[ ] constipation [ ] melena [ ]                                             NEGATIVE[ X]   : hematuria[ ]  dysuria[ ] urgency[ ] incontinence[ ]                                                                   NEGATIVE[ X]   MUSCULOSKELETAL:  arthritis[ ]  joint swelling [ ] muscle weakness [ ] Hx vein stripping [ ]   NEGATIVE[X ]   SKIN/BREAST:  rash[ ] itching [ ]  hair loss[ ] masses[ ]                                                                    NEGATIVE[ X]   PSYCH:  dementia [ ] depression [ ] anxiety[ ]                                                                                     NEGATIVE[X ]   HEME/LYMPH:  bruises easily[ ] enlarged lymph nodes[ ] tender lymph nodes[ ]                     NEGATIVE[ X]   ENDOCRINE:  cold intolerance[ ] heat intolerance[ ] polydipsia[ ]                                                NEGATIVE[ X]     PHYSICAL EXAM  Vital Signs Last 24 Hrs  T(C): 36.7 (12 May 2023 06:45), Max: 36.7 (12 May 2023 06:45)  T(F): 98 (12 May 2023 06:45), Max: 98 (12 May 2023 06:45)  HR: 79 (12 May 2023 06:45) (71 - 86)  BP: 127/71 (12 May 2023 05:30) (88/53 - 136/64)  BP(mean): --  RR: 21 (12 May 2023 06:45) (17 - 21)  SpO2: 97% (12 May 2023 08:31) (95% - 99%)    Parameters below as of 12 May 2023 08:31  Patient On (Oxygen Delivery Method): room air      Right arm bp:                       Left arm bp:    CONSTITUTIONAL:  WNL[ ]   Neuro: WNL[ ] Normal exam oriented to person/place & time with no focal motor or sensory  deficits. Other                     Eyes: WNL[ ]   Normal exam of conjunctiva & lids, pupils equally reactive. Other     ENT: WNL[ ]    Normal exam of nasal/oral mucosa with absence of cyanosis. Other  Neck: WNL[ ]  Normal exam of jugular veins, trachea & thyroid. Other  Chest: WNL[ ] Normal lung exam with good air movement absence of wheezes, rales or Ronchi                                                                             CV:  Auscultation: normal [ ] S3[ ] S4[ ] Irregular [ ] Rub[ ] Clicks[ ]    Murmurs none:[ ]systolic [ ]  diastolic [ ] holosystolic [ ]  Carotids: No Bruits[ ] Other                 Abdominal Aorta: normal [ ] nonpalpable[ ]Other                                                                                      GI: WNL[ ] Normal exam of abdomen, liver & spleen with no noted masses or tenderness. Other                                                                                                        Extremities: WNL[ ] Normal no evidence of cyanosis or deformity Edema: none[ ]trace[ ]1+[ ]2+[ ]3+[ ]4+[ ]  Lower Extremity Pulses: Right[ ] Left[ ]Varicosities[ ]  SKIN :WNL[ ] Normal exam to inspection & palpation. Other:                                                          LABS:                        8.6    6.91  )-----------( 190      ( 12 May 2023 07:05 )             28.5     05-12    139  |  110  |  34<H>  ----------------------------<  109<H>  4.1   |  21  |  0.8    Ca    7.5<L>      12 May 2023 07:05  Mg     1.9     05-12    TPro  5.4<L>  /  Alb  3.4<L>  /  TBili  0.7  /  DBili  x   /  AST  11  /  ALT  8   /  AlkPhos  51  05-12    PT/INR - ( 12 May 2023 00:50 )   PT: 14.40 sec;   INR: 1.25 ratio         PTT - ( 12 May 2023 00:50 )  PTT:29.5 sec    CARDIAC MARKERS ( 12 May 2023 07:05 )  x     / <0.01 ng/mL / x     / x     / x      CARDIAC MARKERS ( 12 May 2023 00:50 )  x     / <0.01 ng/mL / x     / x     / x              Covid Results:     Cardiac Cath:    TTE / JARETT:    Recommendation: (Procedures/Evaluations)  CT HEAD Non-Contrast:[  ]  CT Chest without contrast [ X ]  Echocadiography :[ X ]  Carotid Duplex :[ X ]  PFT : Simple PFT [ X ]  Full [ ]  OPHELIA/PVR: [ ]  Renal Consult [ ]  Pulmonary Consult: [ ]   Vascular Consult [ ]    Dental Consult [ ]   Hem-Onc Consult [ ]   GI Consult [ ]   Other Consultations :    STS Risk Score:  Discussed with patient/HCP [x ] Yes  [ ] No    Impression:    CAD [ ]  Valvular  disease [ ]   Aortic Disease [ ]   MARIAN: Yes[ ] No [ ]   CKD Stage I [ ] , Stage II [ ] , Stage III [ ], Stage IV [ ]   Anemia: Yes [ ], No [ ]  Diabetes :Yes [ ], No [ ]  Acute MI: Yes [ ], No [ ]   Heart Failure: Yes [ ] , No [ ] HFpEF [ ], HFrEF [ ]        Assessment/ Plan:                 Surgeon: Dr. Grant/Av/Nadiya    Consult requesting by: ED Physician     HISTORY OF PRESENT ILLNESS:  79yo M hx of afib on eliquis, HFrEF (unknown EF), CAD s/p CABG (8 years ago), HTN, asthma presented to the hospital for syncopal episode after feeling dizzy yesterday evening.    Patient speaks a dialect similar to Cantonese but is a village dialect. Patient is able to understand Cantonese, but 100%  will not be able to understand the patient's language. I understood 100% of patient's conversation.     Patient was feeling dizzy while walking to the bathroom at 10PM  followed by a fall. +LOC, + AC. Unsure if he had head trauma, but does not experience any headaches. Unwitnessed by family. Denies fevers, shortness of breath, chest pain, abdominal pain, diarrhea, constipation, dysuria.     Upon arrival to the ED, patient was complaining of having productive sputum needing to cough, epistaxis, and followed by ~50cc of hematemesis. Brought into hospital via EMS    At the ED, trauma alert was activated due to the fall. Dried blood found in the nares. VS was T-97.8F, BP: 97/54, HR: 71bpm, RR: 17bpm, SpO2: 96% on RA. After episode of hemoptysis, patient's SBP dropped from 89-->85. 2u prbc were ordered resulting in improvement of BP. Labs significant for microcytic anemia (Hb- 7.3), BUN- 33. troponin<0.01 x1.     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  CTA Abd+Pelvis: no acute pathology  Xray Pelvis: no acute fracture    Patient admitted to ICU for Upper GI bleed. Currently, VS stable: with SBP 110s. Given 2u prbc. started octrotide and protonix infusion. s/p ceftriaxone and erythromycin IV. given K centra 5g. Vascular surgery consulted for aortic dissection. No acute surgical intervention, Recommend keep -140 SBP.  per ED signout. GI consutled for GI Bleed. Plan for colonoscopy in today.    (12 May 2023 04:16)    CT Surgery was consulted to evaluate Aortic Dissection.    NYHA functional class    [x ] Class I (no limitation) [ ] Class II (slight limitation) [ ] Class III (marked limitation) [ ] Class IV (symptoms at rest)    CCS grading of Angina Pectoris: - No ANGINA   [ ] Class I	        Angina only during strenuous or prolonged physical activity  [ ] Class II	Slight limitation, with angina only during vigorous physical activity  [ ] Class III	Symptoms with everyday living activities, ie, moderate limitation  [ ] Class IV	Inability to perform any activity without angina or angina at rest, ie, severe limitation      PAST MEDICAL & SURGICAL HISTORY:  Hypertension      CAD (coronary artery disease)      S/P CABG x 1      Unspecified atrial fibrillation      Chronic systolic congestive heart failure      Asthma          MEDICATIONS  (STANDING):  atorvastatin 20 milliGRAM(s) Oral at bedtime  benzonatate 200 milliGRAM(s) Oral three times a day  ferrous    sulfate 325 milliGRAM(s) Oral daily  lactated ringers. 1000 milliLiter(s) (75 mL/Hr) IV Continuous <Continuous>  montelukast 10 milliGRAM(s) Oral daily  pantoprazole  Injectable 40 milliGRAM(s) IV Push every 12 hours  tamsulosin 0.4 milliGRAM(s) Oral at bedtime    Allergies    No Known Allergies        Review of Systems  10-system review is otherwise negative except HPI above.      PHYSICAL EXAM  Vital Signs Last 24 Hrs  T(C): 36.7 (12 May 2023 06:45), Max: 36.7 (12 May 2023 06:45)  T(F): 98 (12 May 2023 06:45), Max: 98 (12 May 2023 06:45)  HR: 79 (12 May 2023 06:45) (71 - 86)  BP: 127/71 (12 May 2023 05:30) (88/53 - 136/64)  BP(mean): --  RR: 21 (12 May 2023 06:45) (17 - 21)  SpO2: 97% (12 May 2023 08:31) (95% - 99%)    Parameters below as of 12 May 2023 08:31  Patient On (Oxygen Delivery Method): room air    CONSTITUTIONAL:  Ill appearing. Well nourished.  NAD    ENT:   Airway patent,   No thrush    EYES:   Clear bilaterally,   pupils equal,   round and reactive to light.    CARDIAC:   Normal rate,   regular rhythm.    no edema    RESPIRATORY:   No wheezing  Normal chest expansion  Not tachypneic,  No use of accessory muscles    GASTROINTESTINAL:  Abdomen soft,   non-tender,   no guarding,   + BS    MUSCULOSKELETAL:   range of motion is not limited,  no clubbing, cyanosis    NEUROLOGICAL:   Alert  Confused  no motor deficits.    SKIN:   Skin normal color for race,                                                           LABS:                        8.6    6.91  )-----------( 190      ( 12 May 2023 07:05 )             28.5     05-12    139  |  110  |  34<H>  ----------------------------<  109<H>  4.1   |  21  |  0.8    Ca    7.5<L>      12 May 2023 07:05  Mg     1.9     05-12    TPro  5.4<L>  /  Alb  3.4<L>  /  TBili  0.7  /  DBili  x   /  AST  11  /  ALT  8   /  AlkPhos  51  05-12    PT/INR - ( 12 May 2023 00:50 )   PT: 14.40 sec;   INR: 1.25 ratio         PTT - ( 12 May 2023 00:50 )  PTT:29.5 sec    CARDIAC MARKERS ( 12 May 2023 07:05 )  x     / <0.01 ng/mL / x     / x     / x      CARDIAC MARKERS ( 12 May 2023 00:50 )  x     / <0.01 ng/mL / x     / x     / x                Surgeon: Dr. Grant/Av/Nadiya    Consult requesting by:    HISTORY OF PRESENT ILLNESS:  HPI:  79yo M hx of afib on eliquis, HFrEF (unknown EF), CAD s/p CABG (8 years ago), HTN, asthma presented to the hospital for syncopal episode after feeling dizzy yesterday evening.    Patient speaks a dialect similar to Cantonese but is a village dialect. Patient is able to understand Cantonese, but 100%  will not be able to understand the patient's language. I understood 100% of patient's conversation.     Patient was feeling dizzy while walking to the bathroom at 10PM  followed by a fall. +LOC, + AC. Unsure if he had head trauma, but does not experience any headaches. Unwitnessed by family. Denies fevers, shortness of breath, chest pain, abdominal pain, diarrhea, constipation, dysuria.     Upon arrival to the ED, patient was complaining of having productive sputum needing to cough, epistaxis, and followed by ~50cc of hematemesis. Brought into hospital via EMS    At the ED, trauma alert was activated due to the fall. Dried blood found in the nares. VS was T-97.8F, BP: 97/54, HR: 71bpm, RR: 17bpm, SpO2: 96% on RA. After episode of hemoptysis, patient's SBP dropped from 89-->85. 2u prbc were ordered resulting in improvement of BP. Labs significant for microcytic anemia (Hb- 7.3), BUN- 33. troponin<0.01 x1.     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  CTA Abd+Pelvis: no acute pathology  Xray Pelvis: no acute fracture    Patient admitted to ICU for Upper GI bleed. Currently, VS stable: with SBP 110s. Given 2u prbc. started octrotide and protonix infusion. s/p ceftriaxone and erythromycin IV. given K centra 5g. Vascular surgery consulted for aortic dissection. No acute surgical intervention, Recommend keep -140 SBP.  per ED signout. GI consutled for GI Bleed. Plan for colonoscopy in today.    (12 May 2023 04:16)      NYHA functional class    [ ] Class I (no limitation) [ ] Class II (slight limitation) [ ] Class III (marked limitation) [ ] Class IV (symptoms at rest)    CCS grading of Angina Pectoris:  [ ] Class I	        Angina only during strenuous or prolonged physical activity  [ ] Class II	Slight limitation, with angina only during vigorous physical activity  [ ] Class III	Symptoms with everyday living activities, ie, moderate limitation  [ ] Class IV	Inability to perform any activity without angina or angina at rest, ie, severe limitation      PAST MEDICAL & SURGICAL HISTORY:  Hypertension      CAD (coronary artery disease)      S/P CABG x 1      Unspecified atrial fibrillation      Chronic systolic congestive heart failure      Asthma          MEDICATIONS  (STANDING):  atorvastatin 20 milliGRAM(s) Oral at bedtime  benzonatate 200 milliGRAM(s) Oral three times a day  ferrous    sulfate 325 milliGRAM(s) Oral daily  lactated ringers. 1000 milliLiter(s) (75 mL/Hr) IV Continuous <Continuous>  montelukast 10 milliGRAM(s) Oral daily  pantoprazole  Injectable 40 milliGRAM(s) IV Push every 12 hours  tamsulosin 0.4 milliGRAM(s) Oral at bedtime    MEDICATIONS  (PRN):    Antiplatelet therapy:                           Last dose/amt:    Allergies    No Known Allergies    Intolerances        SOCIAL HISTORY:  Smoker: [ ] Yes  [ ] No        PACK YEARS:                         WHEN QUIT?  ETOH use: [ ] Yes  [ ] No              FREQUENCY / QUANTITY:  Ilicit Drug use:  [ ] Yes  [ ] No  Occupation:  Lives with:  Assisted device use:  5 meter walk test: 1____sec, 2____sec, 3___sec  FAMILY HISTORY:      Review of Systems  CONSTITUTIONAL:  Fevers[ ] chills[ ] sweats[ ] fatigue[ ] weight loss[ ] weight gain [ ]            NEGATIVE [X ]   NEURO:  parathesias[ ] seizures [ ]  syncope [ ]  confusion [ ]                                                       NEGATIVE[ X]   EYES: glasses[ ]  blurry vision[ ]  discharge[ ] pain[ ] glaucoma [ ]                                                 NEGATIVE[X ]   ENMT:  difficulty hearing [ ]  vertigo[ ]  dysphagia[ ] epistaxis[ ] recent dental work [ ]           NEGATIVE[ X]   CV:  chest pain[ ] palpitations[ ] PARR [ ] diaphoresis [ ]                                                                  NEGATIVE[ X]   RESPIRATORY:  wheezing[ ] SOB[ ] cough [ ] sputum[ ] hemoptysis[ ]                                          NEGATIVE[ ]   GI:  nausea[ ]  vomiting [ ]  diarrhea[ ] constipation [ ] melena [ ]                                             NEGATIVE[ X]   : hematuria[ ]  dysuria[ ] urgency[ ] incontinence[ ]                                                                   NEGATIVE[ X]   MUSCULOSKELETAL:  arthritis[ ]  joint swelling [ ] muscle weakness [ ] Hx vein stripping [ ]   NEGATIVE[X ]   SKIN/BREAST:  rash[ ] itching [ ]  hair loss[ ] masses[ ]                                                                    NEGATIVE[ X]   PSYCH:  dementia [ ] depression [ ] anxiety[ ]                                                                                     NEGATIVE[X ]   HEME/LYMPH:  bruises easily[ ] enlarged lymph nodes[ ] tender lymph nodes[ ]                     NEGATIVE[ X]   ENDOCRINE:  cold intolerance[ ] heat intolerance[ ] polydipsia[ ]                                                NEGATIVE[ X]     PHYSICAL EXAM  Vital Signs Last 24 Hrs  T(C): 36.7 (12 May 2023 06:45), Max: 36.7 (12 May 2023 06:45)  T(F): 98 (12 May 2023 06:45), Max: 98 (12 May 2023 06:45)  HR: 79 (12 May 2023 06:45) (71 - 86)  BP: 127/71 (12 May 2023 05:30) (88/53 - 136/64)  BP(mean): --  RR: 21 (12 May 2023 06:45) (17 - 21)  SpO2: 97% (12 May 2023 08:31) (95% - 99%)    Parameters below as of 12 May 2023 08:31  Patient On (Oxygen Delivery Method): room air      Right arm bp:                       Left arm bp:    CONSTITUTIONAL:  WNL[ ]   Neuro: WNL[ ] Normal exam oriented to person/place & time with no focal motor or sensory  deficits. Other                     Eyes: WNL[ ]   Normal exam of conjunctiva & lids, pupils equally reactive. Other     ENT: WNL[ ]    Normal exam of nasal/oral mucosa with absence of cyanosis. Other  Neck: WNL[ ]  Normal exam of jugular veins, trachea & thyroid. Other  Chest: WNL[ ] Normal lung exam with good air movement absence of wheezes, rales or Ronchi                                                                             CV:  Auscultation: normal [ ] S3[ ] S4[ ] Irregular [ ] Rub[ ] Clicks[ ]    Murmurs none:[ ]systolic [ ]  diastolic [ ] holosystolic [ ]  Carotids: No Bruits[ ] Other                 Abdominal Aorta: normal [ ] nonpalpable[ ]Other                                                                                      GI: WNL[ ] Normal exam of abdomen, liver & spleen with no noted masses or tenderness. Other                                                                                                        Extremities: WNL[ ] Normal no evidence of cyanosis or deformity Edema: none[ ]trace[ ]1+[ ]2+[ ]3+[ ]4+[ ]  Lower Extremity Pulses: Right[ ] Left[ ]Varicosities[ ]  SKIN :WNL[ ] Normal exam to inspection & palpation. Other:                                                          LABS:                        8.6    6.91  )-----------( 190      ( 12 May 2023 07:05 )             28.5     05-12    139  |  110  |  34<H>  ----------------------------<  109<H>  4.1   |  21  |  0.8    Ca    7.5<L>      12 May 2023 07:05  Mg     1.9     05-12    TPro  5.4<L>  /  Alb  3.4<L>  /  TBili  0.7  /  DBili  x   /  AST  11  /  ALT  8   /  AlkPhos  51  05-12    PT/INR - ( 12 May 2023 00:50 )   PT: 14.40 sec;   INR: 1.25 ratio         PTT - ( 12 May 2023 00:50 )  PTT:29.5 sec    CARDIAC MARKERS ( 12 May 2023 07:05 )  x     / <0.01 ng/mL / x     / x     / x      CARDIAC MARKERS ( 12 May 2023 00:50 )  x     / <0.01 ng/mL / x     / x     / x              Covid Results:     Cardiac Cath:    TTE / JARETT:    Recommendation: (Procedures/Evaluations)  CT HEAD Non-Contrast:[  ]  CT Chest without contrast [ X ]  Echocadiography :[ X ]  Carotid Duplex :[ X ]  PFT : Simple PFT [ X ]  Full [ ]  OPHELIA/PVR: [ ]  Renal Consult [ ]  Pulmonary Consult: [ ]   Vascular Consult [ ]    Dental Consult [ ]   Hem-Onc Consult [ ]   GI Consult [ ]   Other Consultations :    STS Risk Score:  Discussed with patient/HCP [x ] Yes  [ ] No    Impression:    CAD [ ]  Valvular  disease [ ]   Aortic Disease [ ]   MARIAN: Yes[ ] No [ ]   CKD Stage I [ ] , Stage II [ ] , Stage III [ ], Stage IV [ ]   Anemia: Yes [ ], No [ ]  Diabetes :Yes [ ], No [ ]  Acute MI: Yes [ ], No [ ]   Heart Failure: Yes [ ] , No [ ] HFpEF [ ], HFrEF [ ]        Assessment/ Plan:

## 2023-05-12 NOTE — H&P ADULT - HISTORY OF PRESENT ILLNESS
77yo M hx of afib on eliquis, HTN presented to the hospital for syncopal episode after feeling dizzy yesterday evening occuring at the bathroom followed by a fall. +LOC, + AC. Unwitnessed by family. Upon arrival to the ED, patient was complaining of throat pain, epistaxis, and followed by ~50cc of hematemesis. Brought into hospital via EMS    At the ED, trauma alert was activated due to the fall. Dried blood found in the nares. VS was T-97.8F, BP: 97/54, HR: 71bpm, RR: 17bpm, SpO2: 96% on RA. After episode of hemoptysis, patient's SBP dropped from 89-->85. 2u prbc were ordered resulting in improvement of BP. Labs significant for microcytic anemia (Hb- 7.3), BUN- 33. troponin<0.01 x1.     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  CTA Abd+Pelvis: no acute pathology  Xray Pelvis: no acute fracture    Patient admitted to ICU for Upper GI bleed. Currently, VS stable: with SBP 110s. Given 2u prbc. started octrotide and protonix infusion. s/p ceftriaxone and erythromycin IV. given K centra 5g. Vascular surgery consulted for aortic dissection. No acute surgical intervention, per ED signout. GI consutled for GI Bleed. Plan for colonoscopy in today.    79yo M hx of afib on eliquis, HFrEF (unknown EF), CAD s/p CABG (8 years ago), HTN, asthma presented to the hospital for syncopal episode after feeling dizzy yesterday evening.    Patient speaks a dialect similar to Cantonese but is a village dialect. Patient is able to understand Cantonese, but 100%  will not be able to understand the patient's language. I understood 100% of patient's conversation.     Patient was feeling dizzy while walking to the bathroom at 10PM  followed by a fall. +LOC, + AC. Unsure if he had head trauma, but does not experience any headaches. Unwitnessed by family. Denies fevers, shortness of breath, chest pain, abdominal pain, diarrhea, constipation, dysuria.     Upon arrival to the ED, patient was complaining of having productive sputum needing to cough, epistaxis, and followed by ~50cc of hematemesis. Brought into hospital via EMS    At the ED, trauma alert was activated due to the fall. Dried blood found in the nares. VS was T-97.8F, BP: 97/54, HR: 71bpm, RR: 17bpm, SpO2: 96% on RA. After episode of hemoptysis, patient's SBP dropped from 89-->85. 2u prbc were ordered resulting in improvement of BP. Labs significant for microcytic anemia (Hb- 7.3), BUN- 33. troponin<0.01 x1.     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  CTA Abd+Pelvis: no acute pathology  Xray Pelvis: no acute fracture    Patient admitted to ICU for Upper GI bleed. Currently, VS stable: with SBP 110s. Given 2u prbc. started octrotide and protonix infusion. s/p ceftriaxone and erythromycin IV. given K centra 5g. Vascular surgery consulted for aortic dissection. No acute surgical intervention, Recommend keep -140 SBP.  per ED signout. GI consutled for GI Bleed. Plan for colonoscopy in today.

## 2023-05-12 NOTE — ED ADULT NURSE NOTE - NSFALLRISKINTERV_ED_ALL_ED
Assistance OOB with selected safe patient handling equipment if applicable/Assistance with ambulation/Communicate fall risk and risk factors to all staff, patient, and family/Monitor gait and stability/Monitor for mental status changes and reorient to person, place, and time, as needed/Provide visual cue: yellow wristband, yellow gown, etc/Reinforce activity limits and safety measures with patient and family/Toileting schedule using arm’s reach rule for commode and bathroom/Use of alarms - bed, stretcher, chair and/or video monitoring/Call bell, personal items and telephone in reach/Instruct patient to call for assistance before getting out of bed/chair/stretcher/Non-slip footwear applied when patient is off stretcher/[place] to call system/Physically safe environment - no spills, clutter or unnecessary equipment/Purposeful Proactive Rounding/Room/bathroom lighting operational, light cord in reach

## 2023-05-12 NOTE — CHART NOTE - NSCHARTNOTEFT_GEN_A_CORE
Tertiary Trauma Survey (TTS)    Date of TTS: 05-12-23 @ 14:22   Admit Date: 05-12-23  Trauma Activation: CODE / ALERT / CONSULT  Admit GCS:        E-     V-     M-     HPI:  77yo M hx of afib on eliquis, HFrEF (unknown EF), CAD s/p CABG (8 years ago), HTN, asthma presented to the hospital for syncopal episode after feeling dizzy yesterday evening.    Patient speaks a dialect similar to Cantonese but is a village dialect. Patient is able to understand Cantonese, but 100%  will not be able to understand the patient's language. I understood 100% of patient's conversation.     Patient was feeling dizzy while walking to the bathroom at 10PM  followed by a fall. +LOC, + AC. Unsure if he had head trauma, but does not experience any headaches. Unwitnessed by family. Denies fevers, shortness of breath, chest pain, abdominal pain, diarrhea, constipation, dysuria.     Upon arrival to the ED, patient was complaining of having productive sputum needing to cough, epistaxis, and followed by ~50cc of hematemesis. Brought into hospital via EMS    At the ED, trauma alert was activated due to the fall. Dried blood found in the nares. VS was T-97.8F, BP: 97/54, HR: 71bpm, RR: 17bpm, SpO2: 96% on RA. After episode of hemoptysis, patient's SBP dropped from 89-->85. 2u prbc were ordered resulting in improvement of BP. Labs significant for microcytic anemia (Hb- 7.3), BUN- 33. troponin<0.01 x1.     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  CTA Abd+Pelvis: no acute pathology  Xray Pelvis: no acute fracture    Patient admitted to ICU for Upper GI bleed. Currently, VS stable: with SBP 110s. Given 2u prbc. started octrotide and protonix infusion. s/p ceftriaxone and erythromycin IV. given K centra 5g. Vascular surgery consulted for aortic dissection. No acute surgical intervention, Recommend keep -140 SBP.  per ED signout. GI consutled for GI Bleed. Plan for colonoscopy in today.    (12 May 2023 04:16)    Patient seen and examined. Patient speaks Hakkanese. Spoken to with a Cantonese  [id number] and patient's family bedside translating into proper dialect. Patient has no complaints of pain. No new injuries identified.    PHYSICAL EXAM:  General: NAD  HEENT: Normocephalic, atraumatic, EOMI, PEERLA. no scalp lacerations   Neck: Soft, midline trachea. no c-spine tenderness  Chest: No chest wall tenderness, no subcutaneous emphysema   Cardiac: S1, S2, RRR  Respiratory: Bilateral breath sounds, clear and equal bilaterally  Abdomen: Soft, non-distended, non-tender, no rebound, no guarding.  Groin: Normal appearing, pelvis stable   Ext:  Moving b/l upper and lower extremities. Palpable Radial b/l UE, b/l DP palpable in LE.   Back: No T/L/S spine tenderness, No palpable runoff/stepoff/deformity  Rectal: No genevieve blood, JESSICA with good tone    Vital Signs Last 24 Hrs  T(C): 36.3 (12 May 2023 08:00), Max: 36.7 (12 May 2023 06:45)  T(F): 97.4 (12 May 2023 08:00), Max: 98 (12 May 2023 06:45)  HR: 76 (12 May 2023 13:00) (71 - 86)  BP: 138/63 (12 May 2023 08:00) (88/53 - 138/63)  BP(mean): 91 (12 May 2023 08:00) (91 - 91)  RR: 22 (12 May 2023 13:00) (17 - 23)  SpO2: 97% (12 May 2023 13:00) (93% - 99%)    Parameters below as of 12 May 2023 12:00  Patient On (Oxygen Delivery Method): room air        Labs:  CAPILLARY BLOOD GLUCOSE                              8.6    5.89  )-----------( 178      ( 12 May 2023 12:20 )             27.4       Auto Neutrophil %: 74.6 % (05-12-23 @ 12:20)  Auto Immature Granulocyte %: 0.3 % (05-12-23 @ 12:20)  Auto Neutrophil %: 78.3 % (05-12-23 @ 07:05)  Auto Immature Granulocyte %: 0.3 % (05-12-23 @ 07:05)  Auto Neutrophil %: 63.1 % (05-12-23 @ 00:50)  Auto Immature Granulocyte %: 0.5 % (05-12-23 @ 00:50)    05-12    139  |  110  |  34<H>  ----------------------------<  109<H>  4.1   |  21  |  0.8      Calcium, Total Serum: 7.5 mg/dL (05-12-23 @ 07:05)      LFTs:             5.4  | 0.7  | 11       ------------------[51      ( 12 May 2023 07:05 )  3.4  | x    | 8           Lipase:x      Amylase:x         Lactate, Blood: 0.9 mmol/L (05-12-23 @ 07:05)      Coags:     14.40  ----< 1.25    ( 12 May 2023 00:50 )     29.5        CARDIAC MARKERS ( 12 May 2023 12:20 )  x     / <0.01 ng/mL / x     / x     / x      CARDIAC MARKERS ( 12 May 2023 07:05 )  x     / <0.01 ng/mL / x     / x     / x      CARDIAC MARKERS ( 12 May 2023 00:50 )  x     / <0.01 ng/mL / x     / x     / x            Alcohol, Blood: <10 mg/dL (05-12-23 @ 00:50)  Alcohol, Blood: <10 mg/dL (05-12-23 @ 00:50)    RADIOLOGICAL FINDINGS REVIEW:  CXR:    Pelvis Films:     C-Spine Films:    T/L/S Spine Films:    Extremity Films:    Head CT:    C-Spine CT:    Neck CT:    Chest CT:    ABD/Pelvis CT:    Other:    ASSESSMENT:  78yM w/ PMHx of afib on Eliquis and HTN (rest of history unclear) seen as Trauma Alert s/p syncopal fall. Per a nurse translating in the ED, the patient became suddenly dizzy yesterday evening and had a syncopal episode. ?HT, +LOC, +AC. Upon arrival to the ED, the patient had an episode of hematemesis about 50 cc in amount. The patient denies he had hemoptysis in the past, but his wife states he was treated for an melena in February 2023. The patient denies pain since the fall. The patient denies alcohol use, but was a past smoker for the past 40 years.  Trauma assessment in ED: ABCs intact , GCS 15 , AAOx3. External signs of trauma include: dried blood to the bilateral nares.   After episode of hemoptysis, the patient became hypotensive with a BP systolic of 89 -->85, and 2 units of prbc's were ordered and given with improvement in the patient's BP to 112 systolic. The patient was taken to CT scan for a CTA C/A/P. CTA C/A/P showed a very short acute type B aortic dissection originating immediately after the L subclavian artery takeoff from the aortic arch extending only a few centimeters along the aortic arch. Dissection flap does not extend into the descending thoracic aorta or into any branch vessels with no evidence of acute traumatic injury to the chest/abdomen, or pelvis. No evidence of arterial extravasation into the upper GI tract or esophagus.   In the ED, the patient received 2 units of blood for hypotension and a fluid bolus with improvement in his BP to a systolic BP of 112. CT surgery was called, and is recommending vascular surgery to evaluate for the Type B dissection.     - Care per primary team  - Appreciate consults  - All images/reports reviewed. No further traumatic work-up warranted. Tertiary Trauma Survey (TTS)    Date of TTS: 05-12-23 @ 14:22   Admit Date: 05-12-23  Trauma Activation: CODE / ALERT / CONSULT  Admit GCS:        E-     V-     M-     HPI:  77yo M hx of afib on eliquis, HFrEF (unknown EF), CAD s/p CABG (8 years ago), HTN, asthma presented to the hospital for syncopal episode after feeling dizzy yesterday evening.    Patient speaks a dialect similar to Cantonese but is a village dialect. Patient is able to understand Cantonese, but 100%  will not be able to understand the patient's language. I understood 100% of patient's conversation.     Patient was feeling dizzy while walking to the bathroom at 10PM  followed by a fall. +LOC, + AC. Unsure if he had head trauma, but does not experience any headaches. Unwitnessed by family. Denies fevers, shortness of breath, chest pain, abdominal pain, diarrhea, constipation, dysuria.     Upon arrival to the ED, patient was complaining of having productive sputum needing to cough, epistaxis, and followed by ~50cc of hematemesis. Brought into hospital via EMS    At the ED, trauma alert was activated due to the fall. Dried blood found in the nares. VS was T-97.8F, BP: 97/54, HR: 71bpm, RR: 17bpm, SpO2: 96% on RA. After episode of hemoptysis, patient's SBP dropped from 89-->85. 2u prbc were ordered resulting in improvement of BP. Labs significant for microcytic anemia (Hb- 7.3), BUN- 33. troponin<0.01 x1.     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  CTA Abd+Pelvis: no acute pathology  Xray Pelvis: no acute fracture    Patient admitted to ICU for Upper GI bleed. Currently, VS stable: with SBP 110s. Given 2u prbc. started octrotide and protonix infusion. s/p ceftriaxone and erythromycin IV. given K centra 5g. Vascular surgery consulted for aortic dissection. No acute surgical intervention, Recommend keep -140 SBP.  per ED signout. GI consutled for GI Bleed. Plan for colonoscopy in today.    (12 May 2023 04:16)    Patient seen and examined. Patient speaks Hakkanese. Spoken to with a Cantonese  [id number] and patient's family bedside translating into proper dialect. Patient has no complaints of pain. No new injuries identified.    PHYSICAL EXAM:  General: NAD  HEENT: Normocephalic, atraumatic, EOMI, PEERLA. no scalp lacerations   Neck: Soft, midline trachea. no c-spine tenderness  Chest: No chest wall tenderness, no subcutaneous emphysema   Cardiac: S1, S2, RRR  Respiratory: Bilateral breath sounds, clear and equal bilaterally  Abdomen: Soft, non-distended, non-tender, no rebound, no guarding.  Groin: Normal appearing, pelvis stable   Ext:  Moving b/l upper and lower extremities. Palpable Radial b/l UE, b/l DP palpable in LE.   Back: No T/L/S spine tenderness, No palpable runoff/stepoff/deformity  Rectal: No genevieve blood, JESSICA with good tone    Vital Signs Last 24 Hrs  T(C): 36.3 (12 May 2023 08:00), Max: 36.7 (12 May 2023 06:45)  T(F): 97.4 (12 May 2023 08:00), Max: 98 (12 May 2023 06:45)  HR: 76 (12 May 2023 13:00) (71 - 86)  BP: 138/63 (12 May 2023 08:00) (88/53 - 138/63)  BP(mean): 91 (12 May 2023 08:00) (91 - 91)  RR: 22 (12 May 2023 13:00) (17 - 23)  SpO2: 97% (12 May 2023 13:00) (93% - 99%)    Parameters below as of 12 May 2023 12:00  Patient On (Oxygen Delivery Method): room air        Labs:  CAPILLARY BLOOD GLUCOSE                              8.6    5.89  )-----------( 178      ( 12 May 2023 12:20 )             27.4       Auto Neutrophil %: 74.6 % (05-12-23 @ 12:20)  Auto Immature Granulocyte %: 0.3 % (05-12-23 @ 12:20)  Auto Neutrophil %: 78.3 % (05-12-23 @ 07:05)  Auto Immature Granulocyte %: 0.3 % (05-12-23 @ 07:05)  Auto Neutrophil %: 63.1 % (05-12-23 @ 00:50)  Auto Immature Granulocyte %: 0.5 % (05-12-23 @ 00:50)    05-12    139  |  110  |  34<H>  ----------------------------<  109<H>  4.1   |  21  |  0.8      Calcium, Total Serum: 7.5 mg/dL (05-12-23 @ 07:05)      LFTs:             5.4  | 0.7  | 11       ------------------[51      ( 12 May 2023 07:05 )  3.4  | x    | 8           Lipase:x      Amylase:x         Lactate, Blood: 0.9 mmol/L (05-12-23 @ 07:05)      Coags:     14.40  ----< 1.25    ( 12 May 2023 00:50 )     29.5        CARDIAC MARKERS ( 12 May 2023 12:20 )  x     / <0.01 ng/mL / x     / x     / x      CARDIAC MARKERS ( 12 May 2023 07:05 )  x     / <0.01 ng/mL / x     / x     / x      CARDIAC MARKERS ( 12 May 2023 00:50 )  x     / <0.01 ng/mL / x     / x     / x            Alcohol, Blood: <10 mg/dL (05-12-23 @ 00:50)  Alcohol, Blood: <10 mg/dL (05-12-23 @ 00:50)    RADIOLOGICAL FINDINGS REVIEW:  < from: CT Angio Chest Aorta w/wo IV Cont (05.12.23 @ 02:51) >    IMPRESSION:  1.  Very short segment acute type B aortic dissection originating   immediately after the left subclavian artery takeoff from the aortic arch   extending only a few centimeters along the aortic arch. Dissection flap   does not extend into the descending thoracic aorta or into any branch   vessels.  2.  No evidence of acute traumatic injury to the chest, abdomen or pelvis.  3.  No evidence of arterial extravasation into the upper GI tract or   esophagus. Note mucosal/submucosal lesions such as Nighat-Wong tear   would not be appreciated on CT.    Spoke with Brielle Sahu on 5/12/2023 at 2:40 AM with read back.    --- End of Report ---    < end of copied text >    < from: CT Angio Abdomen and Pelvis w/ IV Cont (05.12.23 @ 02:51) >    IMPRESSION:  1.  Very short segment acute type B aortic dissection originating   immediately after the left subclavian artery takeoff from the aortic arch   extending only a few centimeters along the aortic arch. Dissection flap   does not extend into the descending thoracic aorta or into any branch   vessels.  2.  No evidence of acute traumatic injury to the chest, abdomen or pelvis.  3.  No evidence of arterial extravasation into the upper GI tract or   esophagus. Note mucosal/submucosal lesions such as Nighat-Wong tear   would not be appreciated on CT.    Spoke with Brielle Sahu on 5/12/2023 at 2:40 AM with read back.    --- End of Report ---    < end of copied text >    < from: CT Cervical Spine No Cont (05.12.23 @ 02:13) >      IMPRESSION:    CT HEAD:  No acute intracranial pathology or hemorrhage. No acute calvarial   fracture.    MAXILLOFACIAL BONES:  No evidence of maxillofacial bony fracture.    CT CERVICAL SPINE:  No acute fracture or subluxation.    --- End of Report ---    < end of copied text >        ASSESSMENT:  78yM w/ PMHx of afib on Eliquis and HTN (rest of history unclear) seen as Trauma Alert s/p syncopal fall. Per a nurse translating in the ED, the patient became suddenly dizzy yesterday evening and had a syncopal episode. ?HT, +LOC, +AC. Upon arrival to the ED, the patient had an episode of hematemesis about 50 cc in amount. The patient denies he had hemoptysis in the past, but his wife states he was treated for an melena in February 2023. The patient denies pain since the fall. The patient denies alcohol use, but was a past smoker for the past 40 years.  Trauma assessment in ED: ABCs intact , GCS 15 , AAOx3. External signs of trauma include: dried blood to the bilateral nares.   After episode of hemoptysis, the patient became hypotensive with a BP systolic of 89 -->85, and 2 units of prbc's were ordered and given with improvement in the patient's BP to 112 systolic. The patient was taken to CT scan for a CTA C/A/P. CTA C/A/P showed a very short acute type B aortic dissection originating immediately after the L subclavian artery takeoff from the aortic arch extending only a few centimeters along the aortic arch. Dissection flap does not extend into the descending thoracic aorta or into any branch vessels with no evidence of acute traumatic injury to the chest/abdomen, or pelvis. No evidence of arterial extravasation into the upper GI tract or esophagus.   In the ED, the patient received 2 units of blood for hypotension and a fluid bolus with improvement in his BP to a systolic BP of 112. CT surgery was called, and is recommending vascular surgery to evaluate for the Type B dissection.     - Care per primary team  - Appreciate consults  - All images/reports reviewed. No further traumatic work-up warranted.

## 2023-05-12 NOTE — CONSULT NOTE ADULT - NS ATTEND AMEND GEN_ALL_CORE FT
The patient is a 78-year-old gentleman we were called overnight at about 2:30 AM on 5/12/2023 from the emergency room for a discussion regarding his incidental finding of a localized possible type B aortic dissection.    The patient has a complicated past medical history as described above and was admitted with a syncopal episode.    When I saw this patient this morning we had to use an  to be able to communicate with the patient and even that was not perfect as the patient speaks a dialect of Cantonese.    In summary the patient apparently had heart surgery, he thinks around 2012 in [place] and apparently possibly underwent bypass surgery at the time.  He could not give us details of where it was done or who was the surgeon of what really was the operation.    On inquiry he denies any chest pain or shortness of breath or any cardiac complaints.    He did have an episode of hemoptysis or possibly epistaxis and he is known to be anemic and was extremely anemic upon presentation to the emergency room and since then he says he is feeling much better having received 2 units of packed cell transfusion.    On examination the patient is comfortable, denying chest pain and hemodynamically stable with a blood pressure of 120/74 mmHg and a heart rate of 76 bpm.    I had a chance to review the patient's CT scan of the chest which does show an extremely localized type B aortic dissection just at the level of the left subclavian artery without extension proximally or distally or any extension into the branch vessels of the aortic arch.    Currently there is no indication for any cardiac surgical intervention but the patient will benefit from a vascular surgery consult and a cardiology consult.    I also discussed his care with the physicians in the intensive care unit who will be managing the patient and we discussed the importance of strict blood pressure control.
will continue to follow   medical management for multiple comorbidities

## 2023-05-12 NOTE — CONSULT NOTE ADULT - SUBJECTIVE AND OBJECTIVE BOX
Outpt cardiologist:    HPI:  79yo M hx of afib on eliquis, HFrEF (unknown EF), CAD s/p CABG (8 years ago), HTN, asthma presented to the hospital for syncopal episode after feeling dizzy yesterday evening.    Patient speaks a dialect similar to Cantonese but is a village dialect. Patient is able to understand Cantonese, but 100%  will not be able to understand the patient's language. I understood 100% of patient's conversation.     Patient was feeling dizzy while walking to the bathroom at 10PM  followed by a fall. +LOC, + AC. Unsure if he had head trauma, but does not experience any headaches. Unwitnessed by family. Denies fevers, shortness of breath, chest pain, abdominal pain, diarrhea, constipation, dysuria.     Upon arrival to the ED, patient was complaining of having productive sputum needing to cough, epistaxis, and followed by ~50cc of hematemesis. Brought into hospital via EMS    At the ED, trauma alert was activated due to the fall. Dried blood found in the nares. VS was T-97.8F, BP: 97/54, HR: 71bpm, RR: 17bpm, SpO2: 96% on RA. After episode of hemoptysis, patient's SBP dropped from 89-->85. 2u prbc were ordered resulting in improvement of BP. Labs significant for microcytic anemia (Hb- 7.3), BUN- 33. troponin<0.01 x1.     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  CTA Abd+Pelvis: no acute pathology  Xray Pelvis: no acute fracture    Patient admitted to ICU for Upper GI bleed. Currently, VS stable: with SBP 110s. Given 2u prbc. started octrotide and protonix infusion. s/p ceftriaxone and erythromycin IV. given K centra 5g. Vascular surgery consulted for aortic dissection. No acute surgical intervention, Recommend keep -140 SBP.  per ED signout. GI consutled for GI Bleed. Plan for colonoscopy in today.    (12 May 2023 04:16)      ---  cardio fellow additional notes:        PAST MEDICAL & SURGICAL HISTORY  Hypertension    CAD (coronary artery disease)    S/P CABG x 1    Unspecified atrial fibrillation    Chronic systolic congestive heart failure    Asthma        FAMILY HISTORY:  FAMILY HISTORY:      SOCIAL HISTORY:  Social History:  former smoker  no alcohol use (12 May 2023 04:16)      ALLERGIES:  No Known Allergies      MEDICATIONS:  atorvastatin 20 milliGRAM(s) Oral at bedtime  benzonatate 200 milliGRAM(s) Oral three times a day  ferrous    sulfate 325 milliGRAM(s) Oral daily  lactated ringers. 1000 milliLiter(s) (75 mL/Hr) IV Continuous <Continuous>  montelukast 10 milliGRAM(s) Oral daily  pantoprazole  Injectable 40 milliGRAM(s) IV Push every 12 hours  tamsulosin 0.4 milliGRAM(s) Oral at bedtime    PRN:      HOME MEDICATIONS:  Home Medications:  atorvastatin 20 mg oral tablet: 1 tab(s) orally once a day (12 May 2023 06:31)  benzonatate 100 mg oral capsule: 2 cap(s) orally 3 times a day (12 May 2023 06:31)  Eliquis 5 mg oral tablet: 1 tab(s) orally 2 times a day (12 May 2023 06:31)  Entresto 24 mg-26 mg oral tablet: 1 tab(s) orally once a day (12 May 2023 06:31)  eplerenone 25 mg oral tablet: 1 tab(s) orally once a day (12 May 2023 06:31)  ferrous fumarate 325 mg (106 mg elemental iron) oral tablet: 1 tab(s) orally every 48 hours (12 May 2023 06:31)  furosemide 20 mg oral tablet: 1 tab(s) orally once a day (12 May 2023 06:31)  meclizine 25 mg oral tablet: 1 tab(s) orally once a day (12 May 2023 06:31)  montelukast 10 mg oral tablet: 1 tab(s) orally once a day (12 May 2023 06:31)  tamsulosin 0.4 mg oral capsule: 1 cap(s) orally once a day (12 May 2023 06:31)      VITALS:   T(F): 98 ( @ 06:45), Max: 98 ( @ 06:45)  HR: 79 ( @ 06:45) (71 - 86)  BP: 127/71 ( @ 05:30) (88/53 - 136/64)  BP(mean): --  RR: 21 ( @ 06:45) (17 - 21)  SpO2: 97% ( @ 08:31) (95% - 99%)    I&O's Summary      REVIEW OF SYSTEMS:  CONSTITUTIONAL: No weakness, fevers or chills  HEENT: No visual changes, neck/ear pain  RESPIRATORY: No cough, sob  CARDIOVASCULAR: See HPI  GASTROINTESTINAL: No abdominal pain. No nausea, vomiting, diarrhea   GENITOURINARY: No dysuria, frequency or hematuria  NEUROLOGICAL: No new focal deficits  SKIN: No new rashes    PHYSICAL EXAM:  General: Not in distress.  Non-toxic appearing.   HEENT: EOMI  Cardio: regular, S1, S2, no murmur  Pulm: B/L BS.  No wheezing / crackles / rales  Abdomen: Soft, non-tender, non-distended. Normoactive bowel sounds  Extremities: No edema b/l le  Neuro: A&O x3. No focal deficits    LABS:                        8.6    6.91  )-----------( 190      ( 12 May 2023 07:05 )             28.5         139  |  110  |  34<H>  ----------------------------<  109<H>  4.1   |  21  |  0.8    Ca    7.5<L>      12 May 2023 07:05  Mg     1.9         TPro  5.4<L>  /  Alb  3.4<L>  /  TBili  0.7  /  DBili  x   /  AST  11  /  ALT  8   /  AlkPhos  51      PT/INR - ( 12 May 2023 00:50 )   PT: 14.40 sec;   INR: 1.25 ratio         PTT - ( 12 May 2023 00:50 )  PTT:29.5 sec  Lactate, Blood: 0.9 mmol/L (23 @ 07:05)  Troponin T, Serum: <0.01 ng/mL (23 @ 07:05)  Troponin T, Serum: <0.01 ng/mL (23 @ 00:50)    CARDIAC MARKERS ( 12 May 2023 07:05 )  x     / <0.01 ng/mL / x     / x     / x      CARDIAC MARKERS ( 12 May 2023 00:50 )  x     / <0.01 ng/mL / x     / x     / x            Troponin trend:            RADIOLOGY:  -CXR:  -TTE:  -CCTA:  -STRESS TEST:  -CATHETERIZATION:  -OTHER:  EC Lead ECG:   Ventricular Rate 72 BPM    QRS Duration 96 ms    Q-T Interval 420 ms    QTC Calculation(Bazett) 459 ms    R Axis -28 degrees    T Axis 61 degrees    Diagnosis Line Atrial fibrillation  Abnormal ECG    Confirmed by EDIE JACKSON MD ([id number]) on 2023 6:56:08 AM ( @ 00:41)      TELEMETRY EVENTS: Outpt cardiologist: Alecia    HPI:  77yo M hx of afib on eliquis, HFrEF (unknown EF), CAD s/p CABG (8 years ago), HTN, asthma presented to the hospital for syncopal episode after feeling dizzy yesterday evening.    Patient speaks a dialect similar to Cantonese but is a village dialect. Patient is able to understand Cantonese, but 100%  will not be able to understand the patient's language. I understood 100% of patient's conversation.     Patient was feeling dizzy while walking to the bathroom at 10PM  followed by a fall. +LOC, + AC. Unsure if he had head trauma, but does not experience any headaches. Unwitnessed by family. Denies fevers, shortness of breath, chest pain, abdominal pain, diarrhea, constipation, dysuria.     Upon arrival to the ED, patient was complaining of having productive sputum needing to cough, epistaxis, and followed by ~50cc of hematemesis. Brought into hospital via EMS    At the ED, trauma alert was activated due to the fall. Dried blood found in the nares. VS was T-97.8F, BP: 97/54, HR: 71bpm, RR: 17bpm, SpO2: 96% on RA. After episode of hemoptysis, patient's SBP dropped from 89-->85. 2u prbc were ordered resulting in improvement of BP. Labs significant for microcytic anemia (Hb- 7.3), BUN- 33. troponin<0.01 x1.     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  CTA Abd+Pelvis: no acute pathology  Xray Pelvis: no acute fracture    Patient admitted to ICU for Upper GI bleed. Currently, VS stable: with SBP 110s. Given 2u prbc. started octrotide and protonix infusion. s/p ceftriaxone and erythromycin IV. given K centra 5g. Vascular surgery consulted for aortic dissection. No acute surgical intervention, Recommend keep -140 SBP.  per ED signout. GI consutled for GI Bleed. Plan for colonoscopy in today.    (12 May 2023 04:16)        PAST MEDICAL & SURGICAL HISTORY  Hypertension    CAD (coronary artery disease)    S/P CABG x 1    Unspecified atrial fibrillation    Chronic systolic congestive heart failure    Asthma        FAMILY HISTORY:  FAMILY HISTORY: no significant cardiac hx       SOCIAL HISTORY:  Social History:  former smoker  no alcohol use (12 May 2023 04:16)      ALLERGIES:  No Known Allergies      MEDICATIONS:  atorvastatin 20 milliGRAM(s) Oral at bedtime  benzonatate 200 milliGRAM(s) Oral three times a day  ferrous    sulfate 325 milliGRAM(s) Oral daily  lactated ringers. 1000 milliLiter(s) (75 mL/Hr) IV Continuous <Continuous>  montelukast 10 milliGRAM(s) Oral daily  pantoprazole  Injectable 40 milliGRAM(s) IV Push every 12 hours  tamsulosin 0.4 milliGRAM(s) Oral at bedtime    PRN:      HOME MEDICATIONS:  Home Medications:  atorvastatin 20 mg oral tablet: 1 tab(s) orally once a day (12 May 2023 06:31)  benzonatate 100 mg oral capsule: 2 cap(s) orally 3 times a day (12 May 2023 06:31)  Eliquis 5 mg oral tablet: 1 tab(s) orally 2 times a day (12 May 2023 06:31)  Entresto 24 mg-26 mg oral tablet: 1 tab(s) orally once a day (12 May 2023 06:31)  eplerenone 25 mg oral tablet: 1 tab(s) orally once a day (12 May 2023 06:31)  ferrous fumarate 325 mg (106 mg elemental iron) oral tablet: 1 tab(s) orally every 48 hours (12 May 2023 06:31)  furosemide 20 mg oral tablet: 1 tab(s) orally once a day (12 May 2023 06:31)  meclizine 25 mg oral tablet: 1 tab(s) orally once a day (12 May 2023 06:31)  montelukast 10 mg oral tablet: 1 tab(s) orally once a day (12 May 2023 06:31)  tamsulosin 0.4 mg oral capsule: 1 cap(s) orally once a day (12 May 2023 06:31)      VITALS:   T(F): 98 ( @ 06:45), Max: 98 ( @ 06:45)  HR: 79 ( @ 06:45) (71 - 86)  BP: 127/71 ( @ 05:30) (88/53 - 136/64)  BP(mean): --  RR: 21 ( @ 06:45) (17 - 21)  SpO2: 97% ( @ 08:31) (95% - 99%)    I&O's Summary      REVIEW OF SYSTEMS:  CONSTITUTIONAL: No weakness, fevers or chills  HEENT: No visual changes, neck/ear pain  RESPIRATORY: No cough, sob  CARDIOVASCULAR: See HPI  GASTROINTESTINAL: No abdominal pain. No nausea, vomiting, diarrhea   GENITOURINARY: No dysuria, frequency or hematuria  NEUROLOGICAL: No new focal deficits  SKIN: No new rashes    PHYSICAL EXAM:  General: Not in distress.  Non-toxic appearing.   HEENT: EOMI  Cardio: regular, S1, S2, no murmur  Pulm: B/L BS.  No wheezing / crackles / rales  Abdomen: Soft, non-tender, non-distended. Normoactive bowel sounds  Extremities: No edema b/l le  Neuro: A&O x3. No focal deficits    LABS:                        8.6    6.91  )-----------( 190      ( 12 May 2023 07:05 )             28.5         139  |  110  |  34<H>  ----------------------------<  109<H>  4.1   |  21  |  0.8    Ca    7.5<L>      12 May 2023 07:05  Mg     1.9         TPro  5.4<L>  /  Alb  3.4<L>  /  TBili  0.7  /  DBili  x   /  AST  11  /  ALT  8   /  AlkPhos  51      PT/INR - ( 12 May 2023 00:50 )   PT: 14.40 sec;   INR: 1.25 ratio         PTT - ( 12 May 2023 00:50 )  PTT:29.5 sec  Lactate, Blood: 0.9 mmol/L (23 @ 07:05)  Troponin T, Serum: <0.01 ng/mL (23 @ 07:05)  Troponin T, Serum: <0.01 ng/mL (23 @ 00:50)    CARDIAC MARKERS ( 12 May 2023 07:05 )  x     / <0.01 ng/mL / x     / x     / x      CARDIAC MARKERS ( 12 May 2023 00:50 )  x     / <0.01 ng/mL / x     / x     / x            Troponin trend:            RADIOLOGY:  -CXR:  -TTE:  -CCTA:  -STRESS TEST:  -CATHETERIZATION:  -OTHER:  EC Lead ECG:   Ventricular Rate 72 BPM    QRS Duration 96 ms    Q-T Interval 420 ms    QTC Calculation(Bazett) 459 ms    R Axis -28 degrees    T Axis 61 degrees    Diagnosis Line Atrial fibrillation  Abnormal ECG    Confirmed by EDIE JACKSON MD ([id number]) on 2023 6:56:08 AM ( @ 00:41)      TELEMETRY EVENTS: Outpt cardiologist: Alecia    HPI:  79yo M hx of afib on eliquis, HFrEF (unknown EF), CAD s/p CABG (8 years ago), HTN, asthma presented to the hospital for syncopal episode after feeling dizzy yesterday evening.    Patient speaks a dialect similar to Cantonese but is a village dialect. Patient is able to understand Cantonese, but 100%  will not be able to understand the patient's language. I understood 100% of patient's conversation.     Patient was feeling dizzy while walking to the bathroom at 10PM  followed by a fall. +LOC, + AC. Unsure if he had head trauma, but does not experience any headaches. Unwitnessed by family. Denies fevers, shortness of breath, chest pain, abdominal pain, diarrhea, constipation, dysuria.     Upon arrival to the ED, patient was complaining of having productive sputum needing to cough, epistaxis, and followed by ~50cc of hematemesis. Brought into hospital via EMS    At the ED, trauma alert was activated due to the fall. Dried blood found in the nares. VS was T-97.8F, BP: 97/54, HR: 71bpm, RR: 17bpm, SpO2: 96% on RA. After episode of hemoptysis, patient's SBP dropped from 89-->85. 2u prbc were ordered resulting in improvement of BP. Labs significant for microcytic anemia (Hb- 7.3), BUN- 33. troponin<0.01 x1.     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  CTA Abd+Pelvis: no acute pathology  Xray Pelvis: no acute fracture    Patient admitted to ICU for Upper GI bleed. Currently, VS stable: with SBP 110s. Given 2u prbc. started octrotide and protonix infusion. s/p ceftriaxone and erythromycin IV. given K centra 5g. Vascular surgery consulted for aortic dissection. No acute surgical intervention, Recommend keep -140 SBP.  per ED signout. GI consutled for GI Bleed. Plan for colonoscopy in today.    (12 May 2023 04:16)      PAST MEDICAL & SURGICAL HISTORY  Hypertension  CAD (coronary artery disease)  S/P CABG x 1  Unspecified atrial fibrillation  Chronic systolic congestive heart failure  Asthma      FAMILY HISTORY:  FAMILY HISTORY: no significant cardiac hx       SOCIAL HISTORY:  Social History:  former smoker  no alcohol use (12 May 2023 04:16)      ALLERGIES:  No Known Allergies      MEDICATIONS:  atorvastatin 20 milliGRAM(s) Oral at bedtime  benzonatate 200 milliGRAM(s) Oral three times a day  ferrous    sulfate 325 milliGRAM(s) Oral daily  lactated ringers. 1000 milliLiter(s) (75 mL/Hr) IV Continuous <Continuous>  montelukast 10 milliGRAM(s) Oral daily  pantoprazole  Injectable 40 milliGRAM(s) IV Push every 12 hours  tamsulosin 0.4 milliGRAM(s) Oral at bedtime    PRN:      HOME MEDICATIONS:  Home Medications:  atorvastatin 20 mg oral tablet: 1 tab(s) orally once a day (12 May 2023 06:31)  benzonatate 100 mg oral capsule: 2 cap(s) orally 3 times a day (12 May 2023 06:31)  Eliquis 5 mg oral tablet: 1 tab(s) orally 2 times a day (12 May 2023 06:31)  Entresto 24 mg-26 mg oral tablet: 1 tab(s) orally once a day (12 May 2023 06:31)  eplerenone 25 mg oral tablet: 1 tab(s) orally once a day (12 May 2023 06:31)  ferrous fumarate 325 mg (106 mg elemental iron) oral tablet: 1 tab(s) orally every 48 hours (12 May 2023 06:31)  furosemide 20 mg oral tablet: 1 tab(s) orally once a day (12 May 2023 06:31)  meclizine 25 mg oral tablet: 1 tab(s) orally once a day (12 May 2023 06:31)  montelukast 10 mg oral tablet: 1 tab(s) orally once a day (12 May 2023 06:31)  tamsulosin 0.4 mg oral capsule: 1 cap(s) orally once a day (12 May 2023 06:31)      VITALS:   T(F): 98 ( @ 06:45), Max: 98 ( @ 06:45)  HR: 79 ( @ 06:45) (71 - 86)  BP: 127/71 ( @ 05:30) (88/53 - 136/64)  BP(mean): --  RR: 21 ( @ 06:45) (17 - 21)  SpO2: 97% ( @ 08:31) (95% - 99%)    I&O's Summary      REVIEW OF SYSTEMS:  CONSTITUTIONAL: No weakness, fevers or chills  HEENT: No visual changes, neck/ear pain  RESPIRATORY: No cough, sob  CARDIOVASCULAR: See HPI  GASTROINTESTINAL: No abdominal pain. No nausea, vomiting, diarrhea   GENITOURINARY: No dysuria, frequency or hematuria  NEUROLOGICAL: No new focal deficits  SKIN: No new rashes    PHYSICAL EXAM:  General: Not in distress.  Non-toxic appearing.   HEENT: EOMI  Cardio: regular, S1, S2, no murmur  Pulm: B/L BS.  No wheezing / crackles / rales  Abdomen: Soft, non-tender, non-distended. Normoactive bowel sounds  Extremities: No edema b/l le  Neuro: A&O x3. No focal deficits    LABS:                        8.6    6.91  )-----------( 190      ( 12 May 2023 07:05 )             28.5         139  |  110  |  34<H>  ----------------------------<  109<H>  4.1   |  21  |  0.8    Ca    7.5<L>      12 May 2023 07:05  Mg     1.9         TPro  5.4<L>  /  Alb  3.4<L>  /  TBili  0.7  /  DBili  x   /  AST  11  /  ALT  8   /  AlkPhos  51      PT/INR - ( 12 May 2023 00:50 )   PT: 14.40 sec;   INR: 1.25 ratio         PTT - ( 12 May 2023 00:50 )  PTT:29.5 sec  Lactate, Blood: 0.9 mmol/L (23 @ 07:05)  Troponin T, Serum: <0.01 ng/mL (23 @ 07:05)  Troponin T, Serum: <0.01 ng/mL (23 @ 00:50)    CARDIAC MARKERS ( 12 May 2023 07:05 )  x     / <0.01 ng/mL / x     / x     / x      CARDIAC MARKERS ( 12 May 2023 00:50 )  x     / <0.01 ng/mL / x     / x     / x            Troponin trend:            RADIOLOGY:  -CXR:  -TTE:  -CCTA:  -STRESS TEST:  -CATHETERIZATION:  -OTHER:  EC Lead ECG:   Ventricular Rate 72 BPM    QRS Duration 96 ms    Q-T Interval 420 ms    QTC Calculation(Bazett) 459 ms    R Axis -28 degrees    T Axis 61 degrees    Diagnosis Line Atrial fibrillation  Abnormal ECG    Confirmed by EDIE JACKSON MD ([id number]) on 2023 6:56:08 AM ( @ 00:41)      TELEMETRY EVENTS: Outpt cardiologist: Alecia    HPI:  77yo M hx of afib on eliquis, HFrEF (unknown EF), CAD s/p CABG (8 years ago), HTN, asthma presented to the hospital for syncopal episode after feeling dizzy yesterday evening.    Patient speaks a dialect similar to Cantonese but is a village dialect. Patient is able to understand Cantonese, but 100%  will not be able to understand the patient's language. I understood 100% of patient's conversation.     Patient was feeling dizzy while walking to the bathroom at 10PM  followed by a fall. +LOC, + AC. Unsure if he had head trauma, but does not experience any headaches. Unwitnessed by family. Denies fevers, shortness of breath, chest pain, abdominal pain, diarrhea, constipation, dysuria.     Upon arrival to the ED, patient was complaining of having productive sputum needing to cough, epistaxis, and followed by ~50cc of hematemesis. Brought into hospital via EMS    At the ED, trauma alert was activated due to the fall. Dried blood found in the nares. VS was T-97.8F, BP: 97/54, HR: 71bpm, RR: 17bpm, SpO2: 96% on RA. After episode of hemoptysis, patient's SBP dropped from 89-->85. 2u prbc were ordered resulting in improvement of BP. Labs significant for microcytic anemia (Hb- 7.3), BUN- 33. troponin<0.01 x1.     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  CTA Abd+Pelvis: no acute pathology  Xray Pelvis: no acute fracture    Patient admitted to ICU for Upper GI bleed. Currently, VS stable: with SBP 110s. Given 2u prbc. started octrotide and protonix infusion. s/p ceftriaxone and erythromycin IV. given K centra 5g. Vascular surgery consulted for aortic dissection. No acute surgical intervention, Recommend keep -140 SBP.  per ED signout. GI consutled for GI Bleed. Plan for colonoscopy in today.    (12 May 2023 04:16)      PAST MEDICAL & SURGICAL HISTORY  Hypertension  CAD (coronary artery disease)  S/P CABG x 1  Unspecified atrial fibrillation  Chronic systolic congestive heart failure  Asthma      FAMILY HISTORY:  FAMILY HISTORY: no significant cardiac hx       SOCIAL HISTORY:  Social History:  former smoker  no alcohol use (12 May 2023 04:16)      ALLERGIES:  No Known Allergies      MEDICATIONS:  atorvastatin 20 milliGRAM(s) Oral at bedtime  benzonatate 200 milliGRAM(s) Oral three times a day  ferrous    sulfate 325 milliGRAM(s) Oral daily  lactated ringers. 1000 milliLiter(s) (75 mL/Hr) IV Continuous <Continuous>  montelukast 10 milliGRAM(s) Oral daily  pantoprazole  Injectable 40 milliGRAM(s) IV Push every 12 hours  tamsulosin 0.4 milliGRAM(s) Oral at bedtime    PRN:      HOME MEDICATIONS:  Home Medications:  atorvastatin 20 mg oral tablet: 1 tab(s) orally once a day (12 May 2023 06:31)  benzonatate 100 mg oral capsule: 2 cap(s) orally 3 times a day (12 May 2023 06:31)  Eliquis 5 mg oral tablet: 1 tab(s) orally 2 times a day (12 May 2023 06:31)  Entresto 24 mg-26 mg oral tablet: 1 tab(s) orally once a day (12 May 2023 06:31)  eplerenone 25 mg oral tablet: 1 tab(s) orally once a day (12 May 2023 06:31)  ferrous fumarate 325 mg (106 mg elemental iron) oral tablet: 1 tab(s) orally every 48 hours (12 May 2023 06:31)  furosemide 20 mg oral tablet: 1 tab(s) orally once a day (12 May 2023 06:31)  meclizine 25 mg oral tablet: 1 tab(s) orally once a day (12 May 2023 06:31)  montelukast 10 mg oral tablet: 1 tab(s) orally once a day (12 May 2023 06:31)  tamsulosin 0.4 mg oral capsule: 1 cap(s) orally once a day (12 May 2023 06:31)      VITALS:   T(F): 98 ( @ 06:45), Max: 98 ( @ 06:45)  HR: 79 ( @ 06:45) (71 - 86)  BP: 127/71 ( @ 05:30) (88/53 - 136/64)  BP(mean): --  RR: 21 ( @ 06:45) (17 - 21)  SpO2: 97% ( @ 08:31) (95% - 99%)    I&O's Summary      REVIEW OF SYSTEMS:  CONSTITUTIONAL: No weakness, fevers or chills  HEENT: No visual changes, neck/ear pain  RESPIRATORY: No cough, sob  CARDIOVASCULAR: See HPI  GASTROINTESTINAL: No abdominal pain. No nausea, vomiting, diarrhea   GENITOURINARY: No dysuria, frequency or hematuria  NEUROLOGICAL: No new focal deficits  SKIN: No new rashes    PHYSICAL EXAM:  General: Not in distress.  Non-toxic appearing.   HEENT: EOMI  Cardio: irregular, S1, S2, no murmur  Pulm: bibasilar crackles    Abdomen: Soft, non-tender, non-distended. Normoactive bowel sounds  Extremities: No edema b/l le  Neuro: A&O x3. No focal deficits    LABS:                        8.6    6.91  )-----------( 190      ( 12 May 2023 07:05 )             28.5         139  |  110  |  34<H>  ----------------------------<  109<H>  4.1   |  21  |  0.8    Ca    7.5<L>      12 May 2023 07:05  Mg     1.9         TPro  5.4<L>  /  Alb  3.4<L>  /  TBili  0.7  /  DBili  x   /  AST  11  /  ALT  8   /  AlkPhos  51      PT/INR - ( 12 May 2023 00:50 )   PT: 14.40 sec;   INR: 1.25 ratio         PTT - ( 12 May 2023 00:50 )  PTT:29.5 sec  Lactate, Blood: 0.9 mmol/L (23 @ 07:05)  Troponin T, Serum: <0.01 ng/mL (23 @ 07:05)  Troponin T, Serum: <0.01 ng/mL (23 @ 00:50)    CARDIAC MARKERS ( 12 May 2023 07:05 )  x     / <0.01 ng/mL / x     / x     / x      CARDIAC MARKERS ( 12 May 2023 00:50 )  x     / <0.01 ng/mL / x     / x     / x          RADIOLOGY:  - CT Angio Abdomen and Pelvis w/ IV Cont (23): Very short segment acute type B aortic dissection originating immediately after the left subclavian artery takeoff from the aortic arch extending only a few centimeters along the aortic arch. Dissection flap does not extend into the descending thoracic aorta or into any branch vessels      EC Lead ECG:   Ventricular Rate 72 BPM    QRS Duration 96 ms    Q-T Interval 420 ms    QTC Calculation(Bazett) 459 ms    R Axis -28 degrees    T Axis 61 degrees    Diagnosis Line Atrial fibrillation  Abnormal ECG    Confirmed by EDIE JACKSON MD ([id number]) on 2023 6:56:08 AM ( @ 00:41)      TELEMETRY EVENTS:

## 2023-05-12 NOTE — ED PROVIDER NOTE - ATTENDING CONTRIBUTION TO CARE
78-year-old male with a past medical history significant for A-fib on Eliquis hypertension poor historian who presented as a trauma notification.  Patient states that he was in the bathroom when he felt lightheaded and dizzy and had a unwitnessed syncopal event.  Unknown sure if hit his head.  Of note when patient presented patient had an episode of hematemesis.  Patient denies any abdominal pain nausea vomiting fever chills melena or any other medical complaints.    VITAL SIGNS: I have reviewed nursing notes and confirm.  CONSTITUTIONAL: non-toxic, well appearing  SKIN: no rash, no petechiae.  EYES:  EOMI, pink conjunctiva, anicteric  ENT: tongue midline, no exudates, MMM  NECK: Supple; no meningismus, no JVD  CARD: RRR, no murmurs, equal radial pulses bilaterally 2+  RESP: CTAB, no respiratory distress  ABD: Soft, non-tender, non-distended, no peritoneal signs, no HSM, no CVA tenderness  EXT: Normal ROM x4. No edema. No calves tenderness  NEURO: Alert, oriented x3. CN2-12 intact, equal strength bilaterally  PSYCH: Cooperative, appropriate.      78-year-old male that presents as a trauma notification status post syncope.  Due to recent hematemesis concern for upper GI bleed.  GI trauma surgery vascular surgery CT surgery aware of patient.  CT surgery and vascular surgery reviewed imaging and determined the patient only has a type B dissection not actively bleeding.  GI is aware of patient's and patient to have a scope tomorrow.  Due to concern for GI bleed will give Kcentra.  Patient given 2 units of blood as patient did become hypotensive during evaluation.  Patient to be admitted to the MICU.  Of note, pt refusing NG tube.

## 2023-05-12 NOTE — CONSULT NOTE ADULT - SUBJECTIVE AND OBJECTIVE BOX
Gastroenterology Consultation:    Patient is a 78y old  Male who presents with a chief complaint of upper GI bleed (12 May 2023 16:47)    Admitted on: 05-12-23      HPI:  79yo M hx of afib on eliquis, HFrEF (unknown EF), CAD s/p CABG (8 years ago), HTN, asthma presented to the hospital for syncopal episode after feeling dizzy yesterday evening. Patient was feeling dizzy while walking to the bathroom at 10PM  followed by a fall. +LOC. Denies fevers, shortness of breath, , abdominal pain, diarrhea, melena, hematochezia or constipation. Upon arrival to the ED, patient was complaining of having productive sputum needing to cough, epistaxis, and followed by ~50cc of hematemesis. Labs significant for microcytic anemia (Hb- 7.3), BUN- 33. troponin<0.01 x1.   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  CTA Abd+Pelvis: no acute pathology  Patient admitted to ICU for monitoring. gi consulted to r/o upper gi bleeding.         Prior EGD:  feb 2023 [facility] gastric ulcer   Prior Colonoscopy:  feb 2023 [facility] no records available     PAST MEDICAL & SURGICAL HISTORY:  Hypertension      CAD (coronary artery disease)      S/P CABG x 1      Unspecified atrial fibrillation      Chronic systolic congestive heart failure      Asthma            FAMILY HISTORY:  no fh of gi cancers     Social History:  Tobacco: denies   Alcohol: denies  Drugs: denies     Home Medications:  atorvastatin 20 mg oral tablet: 1 tab(s) orally once a day (12 May 2023 06:31)  benzonatate 100 mg oral capsule: 2 cap(s) orally 3 times a day as needed for  cough (12 May 2023 13:49)  docusate sodium 100 mg oral tablet: 1 tab(s) orally once a day (12 May 2023 13:48)  Eliquis 5 mg oral tablet: 1 tab(s) orally 2 times a day (12 May 2023 06:31)  Entresto 24 mg-26 mg oral tablet: 1 tab(s) orally once a day (12 May 2023 06:31)  eplerenone 25 mg oral tablet: 1 tab(s) orally once a day (12 May 2023 06:31)  eplerenone 25 mg oral tablet: 1 tab(s) orally once a day (12 May 2023 13:50)  ferrous fumarate 325 mg (106 mg elemental iron) oral tablet: 1 tab(s) orally every 48 hours (12 May 2023 06:31)  furosemide 20 mg oral tablet: 1 tab(s) orally once a day (12 May 2023 06:31)  meclizine 25 mg oral tablet: 1 tab(s) orally once a day (12 May 2023 06:31)  montelukast 10 mg oral tablet: 1 tab(s) orally once a day (12 May 2023 06:31)  tamsulosin 0.4 mg oral capsule: 1 cap(s) orally once a day (12 May 2023 06:31)        MEDICATIONS  (STANDING):  atorvastatin 20 milliGRAM(s) Oral at bedtime  benzonatate 200 milliGRAM(s) Oral three times a day  ferrous    sulfate 325 milliGRAM(s) Oral daily  lactated ringers. 1000 milliLiter(s) (75 mL/Hr) IV Continuous <Continuous>  montelukast 10 milliGRAM(s) Oral daily  pantoprazole  Injectable 40 milliGRAM(s) IV Push every 12 hours  tamsulosin 0.4 milliGRAM(s) Oral at bedtime    MEDICATIONS  (PRN):      Allergies  No Known Allergies      Review of Systems:   Constitutional:  No Fever, No Chills  ENT/Mouth:  No Hearing Changes,  No Difficulty Swallowing  Eyes:  No Eye Pain, No Vision Changes  Cardiovascular:  No Chest Pain, No Palpitations  Respiratory:  No Cough, No Dyspnea  Gastrointestinal:  As described in HPI  Musculoskeletal:  No Joint Swelling, No Back Pain  Skin:  No Skin Lesions, No Jaundice  Neuro:  No Syncope, No Dizziness  Heme/Lymph:  No Bruising, No Bleeding.          Physical Examination:  T(C): 37.1 (05-12-23 @ 16:00), Max: 37.1 (05-12-23 @ 16:00)  HR: 72 (05-12-23 @ 19:00) (71 - 86)  BP: 138/63 (05-12-23 @ 08:00) (88/53 - 138/63)  RR: 22 (05-12-23 @ 19:00) (14 - 23)  SpO2: 93% (05-12-23 @ 19:00) (93% - 99%)  Height (cm): 175.3 (05-12-23 @ 06:45)  Weight (kg): 101.1 (05-12-23 @ 06:45)    05-12-23 @ 07:01  -  05-12-23 @ 21:57  --------------------------------------------------------  IN: 1785 mL / OUT: 800 mL / NET: 985 mL          GENERAL: AAOx3, no acute distress.  HEAD:  Atraumatic, Normocephalic  EYES: conjunctiva and sclera clear  NECK: Supple, no JVD or thyromegaly  CHEST/LUNG: Clear to auscultation bilaterally; No wheeze, rhonchi, or rales  HEART: Regular rate and rhythm; normal S1, S2, No murmurs.  ABDOMEN: Soft, nontender, nondistended; galdino brown stool  NEUROLOGY: No asterixis or tremor.   SKIN: Intact, no jaundice        Data:                        8.4    5.24  )-----------( 178      ( 12 May 2023 18:00 )             27.2     Hgb Trend:  8.4  05-12-23 @ 18:00  8.6  05-12-23 @ 12:20  8.6  05-12-23 @ 07:05  7.3  05-12-23 @ 00:50        05-12    139  |  110  |  34<H>  ----------------------------<  109<H>  4.1   |  21  |  0.8    Ca    7.5<L>      12 May 2023 07:05  Mg     1.9     05-12    TPro  5.4<L>  /  Alb  3.4<L>  /  TBili  0.7  /  DBili  x   /  AST  11  /  ALT  8   /  AlkPhos  51  05-12    Liver panel trend:  TBili 0.7   /   AST 11   /   ALT 8   /   AlkP 51   /   Tptn 5.4   /   Alb 3.4    /   DBili --      05-12  TBili 0.5   /   AST 11   /   ALT 9   /   AlkP 52   /   Tptn 5.5   /   Alb 3.5    /   DBili --      05-12      PT/INR - ( 12 May 2023 00:50 )   PT: 14.40 sec;   INR: 1.25 ratio         PTT - ( 12 May 2023 00:50 )  PTT:29.5 sec        Radiology:  CT Angio Abdomen and Pelvis w/ IV Cont:   ACC: [id number] EXAM:  CT ANGIO ABD PELV (W)AW IC   ORDERED BY: JULIA SNEED     ACC: [id number] EXAM:  CT ANGIO CHEST AORTA WAWIC   ORDERED BY: JULIA SNEED     PROCEDURE DATE:  05/12/2023          INTERPRETATION:  CLINICAL STATEMENT: Upper GI bleed. Vomiting blood.   Syncopal fall.    TECHNIQUE: Contiguous axial CT images were obtained from the thoracic   inlet to the pubic symphysis prior to and following administration of   100c Optiray 320 intravenous contrast, utilizing both dissection and GI   bleed protocols.  Oral contrast was not administered.  Reformatted images   in the coronal and sagittal planes were acquired. Maximum intensity   projection 3-D reformats were obtained.    COMPARISON CT: None.    OTHER STUDIES USED FOR CORRELATION: None.      FINDINGS:    CHEST:    AORTA: Ascending thoracic aorta is normal in caliber and there is no   evidence of type A aortic dissection. Very short segment acute type B   aortic dissection originating immediately after the left subclavian   artery takeoff from the aortic arch and extending only a few centimeters   along the aortic arch. Dissection flap does not extend into the   descending thoracic aorta or into any branch vessels. Moderate   atherosclerosis.    LUNGS/PLEURA/AIRWAYS: Respiratory motion artifact limits evaluation.   Central airways patent. No acute lobar consolidation, effusion or   pneumothorax. Bilateral areas of atelectasis and scarring.    MEDIASTINUM/THORACIC NODES: Shotty mediastinal lymph nodes are noted  without pathologic enlargement.    HEART/GREAT VESSELS: Cardiomegaly. Sternotomy and CABG.      ABDOMEN/PELVIS:    HEPATOBILIARY: Left hepatic lobe cyst. Otherwise no focal abnormalities.    SPLEEN: Unremarkable.    PANCREAS: Unremarkable.    ADRENAL GLANDS: Unremarkable.    KIDNEYS: Symmetric renal enhancement. No hydronephrosis. Left renal lower   pole cyst.    ABDOMINOPELVIC NODES: No definite lymphadenopathy.    PELVIC ORGANS: Unremarkable.    PERITONEUM/MESENTERY/BOWEL: No bowel obstruction, free fluid or free air.   No evidence of intraluminal arterial extravasation or venous pooling.   Specifically no esophageal or upper GI tract abnormality.    BONES/SOFT TISSUES: No acute bony abnormality. Degenerative changes in   the pelvis and spine. Sternotomy..      IMPRESSION:  1.  Very short segment acute type B aortic dissection originating   immediately after the left subclavian artery takeoff from the aortic arch   extending only a few centimeters along the aortic arch. Dissection flap   does not extend into the descending thoracic aorta or into any branch   vessels.  2.  No evidence of acute traumatic injury to the chest, abdomen or pelvis.  3.  No evidence of arterial extravasation into the upper GI tract or   esophagus. Note mucosal/submucosal lesions such as Nighat-Wong tear   would not be appreciated on CT.    Spoke with Brielle Sahu on 5/12/2023 at 2:40 AM with read back.    --- End of Report ---            ADRIANNE KNOWLES MD; Attending Radiologist  This document has been electronically signed. May 12 2023  3:19AM (05-12-23 @ 02:51)       Gastroenterology Consultation:    Patient is a 78y old  Male who presents with a chief complaint of upper GI bleed (12 May 2023 16:47)    Admitted on: 05-12-23      HPI:  77yo M hx of afib on eliquis, HFrEF (unknown EF), CAD s/p CABG (8 years ago), HTN, asthma presented to the hospital for syncopal episode after feeling dizzy yesterday evening. Patient was feeling dizzy while walking to the bathroom at 10PM  followed by a fall. +LOC. Denies fevers, shortness of breath, , abdominal pain, diarrhea, melena, hematochezia or constipation. Upon arrival to the ED, patient was complaining of having productive sputum needing to cough, epistaxis, and followed by ~50cc of hematemesis. Labs significant for microcytic anemia (Hb- 7.3), BUN- 33. troponin<0.01 x1.   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  CTA Abd+Pelvis: no acute pathology  Patient admitted to ICU for monitoring. gi consulted to r/o upper gi bleeding.     grandson at the bedside interpreting , patient speaks different dialect from      Prior EGD:  feb 2023 [facility] gastric ulcer   Prior Colonoscopy:  feb 2023 [facility] no records available     PAST MEDICAL & SURGICAL HISTORY:  Hypertension      CAD (coronary artery disease)      S/P CABG x 1      Unspecified atrial fibrillation      Chronic systolic congestive heart failure      Asthma            FAMILY HISTORY:  no fh of gi cancers     Social History:  Tobacco: denies   Alcohol: denies  Drugs: denies     Home Medications:  atorvastatin 20 mg oral tablet: 1 tab(s) orally once a day (12 May 2023 06:31)  benzonatate 100 mg oral capsule: 2 cap(s) orally 3 times a day as needed for  cough (12 May 2023 13:49)  docusate sodium 100 mg oral tablet: 1 tab(s) orally once a day (12 May 2023 13:48)  Eliquis 5 mg oral tablet: 1 tab(s) orally 2 times a day (12 May 2023 06:31)  Entresto 24 mg-26 mg oral tablet: 1 tab(s) orally once a day (12 May 2023 06:31)  eplerenone 25 mg oral tablet: 1 tab(s) orally once a day (12 May 2023 06:31)  eplerenone 25 mg oral tablet: 1 tab(s) orally once a day (12 May 2023 13:50)  ferrous fumarate 325 mg (106 mg elemental iron) oral tablet: 1 tab(s) orally every 48 hours (12 May 2023 06:31)  furosemide 20 mg oral tablet: 1 tab(s) orally once a day (12 May 2023 06:31)  meclizine 25 mg oral tablet: 1 tab(s) orally once a day (12 May 2023 06:31)  montelukast 10 mg oral tablet: 1 tab(s) orally once a day (12 May 2023 06:31)  tamsulosin 0.4 mg oral capsule: 1 cap(s) orally once a day (12 May 2023 06:31)        MEDICATIONS  (STANDING):  atorvastatin 20 milliGRAM(s) Oral at bedtime  benzonatate 200 milliGRAM(s) Oral three times a day  ferrous    sulfate 325 milliGRAM(s) Oral daily  lactated ringers. 1000 milliLiter(s) (75 mL/Hr) IV Continuous <Continuous>  montelukast 10 milliGRAM(s) Oral daily  pantoprazole  Injectable 40 milliGRAM(s) IV Push every 12 hours  tamsulosin 0.4 milliGRAM(s) Oral at bedtime    MEDICATIONS  (PRN):      Allergies  No Known Allergies      Review of Systems:   Constitutional:  No Fever, No Chills  ENT/Mouth:  No Hearing Changes,  No Difficulty Swallowing  Eyes:  No Eye Pain, No Vision Changes  Cardiovascular:  No Chest Pain, No Palpitations  Respiratory:  No Cough, No Dyspnea  Gastrointestinal:  As described in HPI  Musculoskeletal:  No Joint Swelling, No Back Pain  Skin:  No Skin Lesions, No Jaundice  Neuro:  No Syncope, No Dizziness  Heme/Lymph:  No Bruising, No Bleeding.          Physical Examination:  T(C): 37.1 (05-12-23 @ 16:00), Max: 37.1 (05-12-23 @ 16:00)  HR: 72 (05-12-23 @ 19:00) (71 - 86)  BP: 138/63 (05-12-23 @ 08:00) (88/53 - 138/63)  RR: 22 (05-12-23 @ 19:00) (14 - 23)  SpO2: 93% (05-12-23 @ 19:00) (93% - 99%)  Height (cm): 175.3 (05-12-23 @ 06:45)  Weight (kg): 101.1 (05-12-23 @ 06:45)    05-12-23 @ 07:01  -  05-12-23 @ 21:57  --------------------------------------------------------  IN: 1785 mL / OUT: 800 mL / NET: 985 mL          GENERAL: AAOx3, no acute distress.  HEAD:  Atraumatic, Normocephalic  EYES: conjunctiva and sclera clear  NECK: Supple, no JVD or thyromegaly  CHEST/LUNG: Clear to auscultation bilaterally; No wheeze, rhonchi, or rales  HEART: Regular rate and rhythm; normal S1, S2, No murmurs.  ABDOMEN: Soft, nontender, nondistended; galdino brown stool  NEUROLOGY: No asterixis or tremor.   SKIN: Intact, no jaundice        Data:                        8.4    5.24  )-----------( 178      ( 12 May 2023 18:00 )             27.2     Hgb Trend:  8.4  05-12-23 @ 18:00  8.6  05-12-23 @ 12:20  8.6  05-12-23 @ 07:05  7.3  05-12-23 @ 00:50        05-12    139  |  110  |  34<H>  ----------------------------<  109<H>  4.1   |  21  |  0.8    Ca    7.5<L>      12 May 2023 07:05  Mg     1.9     05-12    TPro  5.4<L>  /  Alb  3.4<L>  /  TBili  0.7  /  DBili  x   /  AST  11  /  ALT  8   /  AlkPhos  51  05-12    Liver panel trend:  TBili 0.7   /   AST 11   /   ALT 8   /   AlkP 51   /   Tptn 5.4   /   Alb 3.4    /   DBili --      05-12  TBili 0.5   /   AST 11   /   ALT 9   /   AlkP 52   /   Tptn 5.5   /   Alb 3.5    /   DBili --      05-12      PT/INR - ( 12 May 2023 00:50 )   PT: 14.40 sec;   INR: 1.25 ratio         PTT - ( 12 May 2023 00:50 )  PTT:29.5 sec        Radiology:  CT Angio Abdomen and Pelvis w/ IV Cont:   ACC: [id number] EXAM:  CT ANGIO ABD PELV (W)AW IC   ORDERED BY: JULIA SNEED     ACC: [id number] EXAM:  CT ANGIO CHEST AORTA WAWIC   ORDERED BY: JULIA SNEED     PROCEDURE DATE:  05/12/2023          INTERPRETATION:  CLINICAL STATEMENT: Upper GI bleed. Vomiting blood.   Syncopal fall.    TECHNIQUE: Contiguous axial CT images were obtained from the thoracic   inlet to the pubic symphysis prior to and following administration of   100c Optiray 320 intravenous contrast, utilizing both dissection and GI   bleed protocols.  Oral contrast was not administered.  Reformatted images   in the coronal and sagittal planes were acquired. Maximum intensity   projection 3-D reformats were obtained.    COMPARISON CT: None.    OTHER STUDIES USED FOR CORRELATION: None.      FINDINGS:    CHEST:    AORTA: Ascending thoracic aorta is normal in caliber and there is no   evidence of type A aortic dissection. Very short segment acute type B   aortic dissection originating immediately after the left subclavian   artery takeoff from the aortic arch and extending only a few centimeters   along the aortic arch. Dissection flap does not extend into the   descending thoracic aorta or into any branch vessels. Moderate   atherosclerosis.    LUNGS/PLEURA/AIRWAYS: Respiratory motion artifact limits evaluation.   Central airways patent. No acute lobar consolidation, effusion or   pneumothorax. Bilateral areas of atelectasis and scarring.    MEDIASTINUM/THORACIC NODES: Shotty mediastinal lymph nodes are noted  without pathologic enlargement.    HEART/GREAT VESSELS: Cardiomegaly. Sternotomy and CABG.      ABDOMEN/PELVIS:    HEPATOBILIARY: Left hepatic lobe cyst. Otherwise no focal abnormalities.    SPLEEN: Unremarkable.    PANCREAS: Unremarkable.    ADRENAL GLANDS: Unremarkable.    KIDNEYS: Symmetric renal enhancement. No hydronephrosis. Left renal lower   pole cyst.    ABDOMINOPELVIC NODES: No definite lymphadenopathy.    PELVIC ORGANS: Unremarkable.    PERITONEUM/MESENTERY/BOWEL: No bowel obstruction, free fluid or free air.   No evidence of intraluminal arterial extravasation or venous pooling.   Specifically no esophageal or upper GI tract abnormality.    BONES/SOFT TISSUES: No acute bony abnormality. Degenerative changes in   the pelvis and spine. Sternotomy..      IMPRESSION:  1.  Very short segment acute type B aortic dissection originating   immediately after the left subclavian artery takeoff from the aortic arch   extending only a few centimeters along the aortic arch. Dissection flap   does not extend into the descending thoracic aorta or into any branch   vessels.  2.  No evidence of acute traumatic injury to the chest, abdomen or pelvis.  3.  No evidence of arterial extravasation into the upper GI tract or   esophagus. Note mucosal/submucosal lesions such as Nighat-Wong tear   would not be appreciated on CT.    Spoke with Brielle Sahu on 5/12/2023 at 2:40 AM with read back.    --- End of Report ---            ADRIANNE KNOWLES MD; Attending Radiologist  This document has been electronically signed. May 12 2023  3:19AM (05-12-23 @ 02:51)

## 2023-05-12 NOTE — CONSULT NOTE ADULT - SUBJECTIVE AND OBJECTIVE BOX
VASCULAR SURGERY CONSULT NOTE      HPI: 78y [place]/cantonese speaking Male w/ PMHx of afib on Eliquis and HTN (rest of history unclear) presented to the ED as Trauma Alert s/p syncopal fall. Per a nurse translating in the ED, the patient became suddenly dizzy yesterday evening and had a syncopal episode. Upon arrival to the ED, the patient had an episode of hematemesis about 50 cc in amount. The patient denies he had hemoptysis in the past, but his wife states he was treated for an melena in February 2023. The patient denies pain since the fall. The patient denies alcohol use, but was a past smoker for the past 40 years.  Trauma assessment in ED: ABCs intact , GCS 15 , AAOx3. External signs of trauma include: dried blood to the bilateral nares.    Vascular surgery consulted for CT finding of type B aortic dissection. History noted as above; difficult to obtain full history due to language barrier. Per nurse translating in ED, patient states that he gets dizzy whenever his blood pressure is low. He takes HTN medications, but states his blood pressure is labile, going as low as SBP of 60s and as high as SBP of 140s. Denies any history of blood clots or vascular disease. States he has had bypass surgery many years ago but does not recall for what. Denies any pain in the extremities or discoloration. Palpable peripheral pulses. .   Of note, patient has been having episodes of hemoptysis in the ED, Hgb is 7.8, received 2u PRBCs.       PAST MEDICAL & SURGICAL HISTORY:    No Known Allergies    Home Medications:    No permtinent family history of PVD    REVIEW OF SYSTEMS:  GENERAL:                                         negative  SKIN:                                                 negative  OPTHALMOLOGIC:                          negative  ENMT:                                               negative  RESPIRATORY AND THORAX:        negative  CARDIOVASCULAR:                         negative  GASTROINTESTINAL:                       negative  NEPHROLOGY:                                  negative  MUSCULOSKELETAL:                       negative  NEUROLOGIC:                                   negative  PSYCHIATRIC:                                    negative  HEMATOLOGY/LYMPHATICS:         negative  ENDOCRINE:                                     negative  ALLERGIC/IMMUNOLOGIC:            negative    12 point ROS otherwise normal except as stated in HPI  FHx: none  SHX:  [ ]  smoking     [ ] alcohol use      Vital Signs Last 24 Hrs  T(C): 36.6 (12 May 2023 00:22), Max: 36.6 (12 May 2023 00:22)  T(F): 97.8 (12 May 2023 00:22), Max: 97.8 (12 May 2023 00:22)  HR: 84 (12 May 2023 01:25) (71 - 85)  BP: 110/58 (12 May 2023 01:25) (88/53 - 110/58)  BP(mean): --  RR: 18 (12 May 2023 01:03) (17 - 18)  SpO2: 99% (12 May 2023 01:03) (96% - 99%)    Parameters below as of 12 May 2023 00:22  Patient On (Oxygen Delivery Method): room air      PHYSICAL EXAM  Appearance: Normal, AAOx3	  Neck: Supple  Cardiovascular: Normal S1 S2  Respiratory: Chest rise equal bilaterally, no labored breathing	  Gastrointestinal:  Soft, Non-tender, non-distended	  Skin: No rashes, No ecchymoses, No cyanosis  Extremities: Normal range of motion, No clubbing, cyanosis or edema  Vascular: Peripheral pulses palpable 2+ bilaterally in LE and UE      MEDICATIONS:   MEDICATIONS  (STANDING):  octreotide  Infusion 8 MICROgram(s)/Hr (1.6 mL/Hr) IV Continuous <Continuous>  pantoprazole Infusion 8 mG/Hr (10 mL/Hr) IV Continuous <Continuous>    MEDICATIONS  (PRN):      LAB/STUDIES:                        7.3    6.62  )-----------( 207      ( 12 May 2023 00:50 )             25.1     05-12    140  |  105  |  33<H>  ----------------------------<  120<H>  3.6   |  25  |  1.0    Ca    8.1<L>      12 May 2023 00:50    TPro  5.5<L>  /  Alb  3.5  /  TBili  0.5  /  DBili  x   /  AST  11  /  ALT  9   /  AlkPhos  52  05-12    PT/INR - ( 12 May 2023 00:50 )   PT: 14.40 sec;   INR: 1.25 ratio         PTT - ( 12 May 2023 00:50 )  PTT:29.5 sec  LIVER FUNCTIONS - ( 12 May 2023 00:50 )  Alb: 3.5 g/dL / Pro: 5.5 g/dL / ALK PHOS: 52 U/L / ALT: 9 U/L / AST: 11 U/L / GGT: x               CARDIAC MARKERS ( 12 May 2023 00:50 )  x     / <0.01 ng/mL / x     / x     / x          IMAGING:  < from: CT Angio Chest Aorta w/wo IV Cont (05.12.23 @ 02:51) >  IMPRESSION:  1.  Very short segment acute type B aortic dissection originating   immediately after the left subclavian artery takeoff from the aortic arch   extending only a few centimeters along the aortic arch. Dissection flap   does not extend into the descending thoracic aorta or into any branch   vessels.  2.  No evidence of acute traumatic injury to the chest, abdomen or pelvis.  3.  No evidence of arterial extravasation into the upper GI tract or   esophagus. Note mucosal/submucosal lesions such as Nighat-Wong tear   would not be appreciated on CT.

## 2023-05-12 NOTE — PROGRESS NOTE ADULT - ASSESSMENT
77yo M hx of afib on eliquis, HFrEF (unknown EF), CAD s/p CABG (8 years ago), HTN, asthma presented to the hospital for syncopal episode followed by unwitnessed fall. At the ED, had 50cc hematemesis from suspected upper GIB. Admitted to ICU    #Suspected Upper GI Bleed  #Hematemesis  #Acute blood loss anemia  - pt presented with hematemesis, epistaxis, and fall associated with hypotension  - Hgb 7.3 received 2u RBC, repeat Hgb stable  - GI following -- plan for EGD next week, clear liquid diet, advance as tolerated  - IV PPI BID  - transfuse for Hgb < 7.0  - ferrous sulfate 325mg qd   - LR 75 cc/hr until eating    #Unwitnessed fall  - trauma workup CTH, CT spine, CT A/P, XR pelvis negative   - 5/12 echo: EF 68%, mild-mod AR, mild MR, mild TR, mild pHTN  - fall precautions    #Acute type B aortic dissection (very short segment)  - CTA chest aorta w/wo IV contrast: very short segment acute type B aortic dissection 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 extending few cm from aortic arch, no evidence of acute traumatic injury, arterial extravasation  - Vascular surgery consulted: strict BP control < 140, no acute surgery, hold all AC for GIB  - keep SBP<140, esmolol drip if needed   - CT Surgery consulted, rec BP control, fluid resuscitation for hypovolemia; keep HR < 80  - trop negative     #HFrEF (unknown EF)  #CAD s/p CABG   - at home is on lasix 20, entresto, aldactone  - hold lasix, entresto, aldactone  - repeat 5/12 echo: EF 68%, mild-mod AR, mild MR, mild TR, mild pHTN  - statin    #Paroxysmal afib on eliquis  - currently rate controlled without rate control agents   - hold AC for GIB  - keep HR < 80     #Hx of asthma  - not in exacerbation  - SpO2 98% on RA, no wheezing   - c/w montelukast     Misc  - DVT ppx: held for GIB  - GI ppx: IV PPI BID  - Diet: clears, advance as tolerated  - Activity: IAT  - Code status: FULL  - Dispo: MICU

## 2023-05-12 NOTE — CONSULT NOTE ADULT - ASSESSMENT
IMPRESSION:    Acute upper GI bleed  Acute type B aortic dissection  Hematemesis  Unwitnessed fall  HO atrial fibrillation on Eliquis  HO asthma  HO HFrEF, CAD s/p CABG 2015, HTN    PLAN:    CNS: Avoid sedatives    HEENT: Oral care    PULMONARY: HOB @ 45 degrees. Aspiration precautions. On room air.    CARDIOVASCULAR: Appears volume resuscitated. Gently hydration LR 75 while NPO. Echo. Trops noted. Per CT surgery and vascular, keep SBP <140.     GI: PPI IV BID. NPO. GI evaluation.    RENAL: Follow up renal function and lytes. Correct as needed.    INFECTIOUS DISEASE: Monitor vital signs off antibiotics. Follow up cultures    HEMATOLOGICAL: DVT prophylaxis SCDs. Hold anticoagulation. Monitor CBC and Coags. Active type and screen. Maintain 2 large bore peripheral IVs.    ENDOCRINE: Follow up fingersticks. Insulin protocol if needed    MUSCULOSKELETAL: Bedrest    DISPO: Patient requires MICU monitoring at this time  IMPRESSION:    Acute upper GI bleed  Type B aortic dissection  Unwitnessed fall Trauma work up negative   HO atrial fibrillation on Eliquis  HO asthma  HO HFrEF, CAD s/p CABG 2015, HTN    PLAN:    CNS: Avoid sedatives.  CTH noted     HEENT: Oral care    PULMONARY: HOB @ 45 degrees. Aspiration precautions. On room air.    CARDIOVASCULAR: Appears volume resuscitated. Echo. Trops noted. Per CT surgery and vascular.  BP control  CTs nd Vascular follow ups .     GI: PPI IV BID. NPO. GI evaluation.      RENAL: Follow up renal function and lytes. Correct as needed.    INFECTIOUS DISEASE: Monitor vital signs off antibiotics. Follow up cultures    HEMATOLOGICAL: DVT prophylaxis SCDs. Hold anticoagulation. Monitor CBC and Coags. Active type and screen. Maintain 2 large bore peripheral IVs.    ENDOCRINE: Follow up fingersticks. Insulin protocol if needed    MUSCULOSKELETAL: Bedrest    DISPO: Patient requires MICU monitoring at this time

## 2023-05-12 NOTE — ED ADULT NURSE NOTE - NSFALLFACTORS_ED_ALL_ED
Patient returned call. States he has been off of work for approximately two weeks now. He has been off due to not being allowed back to work until forms are completed. He states he was off originally due to his anxiety and \"feeling like people were having thoughts about me like a paranoid schizophrenia, but I don't have that\" and feels it's \"when they mandate me to work more than 40 hours per week at the long 12 plus hour shifts.\" Questions were reviewed for the forms with the symptoms patient was experiencing during the episodes of anxiety, which included \"hyperventilating, sweating, sometimes nausea, feeling paranoid, all like what would be maybe an anxiety attack.\" Patient states his sleep and concentration weren't affected when he has his medications, but feels increased stress at over 40 hours per week as a . Writer stated the HR form for disclosure needed to be signed in addition to form from [place]- it is placed at the  of [facility]; patient will stop in clinic tomorrow or Monday to sign. Patient aware forms will be worked on on Monday and sent to Dr. Stone for review. Patient agrees these are episodic flares and do not feel they are continuous or fully debilitating.    Confusion/AMS

## 2023-05-12 NOTE — ED PROVIDER NOTE - OBJECTIVE STATEMENT
Patient is a 78-year-old male with past medical history tension CHF and A-fib on Eliquis presenting to the ED status post syncopal episode that was unwitnessed.  Patient states that he was in the bathroom when he felt dizzy and the next he remembers is waking up on the floor with a nosebleed. while being evaluated in the ed he had an episode of large black vomiting episode with multiple clots. Otherwise denies any fever, chills, headache, changes in vision, cough, congestion, cp, palpitations, sob, n/v/d, abd pain, constipation, urinary complaints, lower extremity pain/swelling.

## 2023-05-12 NOTE — ED PROVIDER NOTE - PHYSICAL EXAMINATION
Constitutional: Well developed, well nourished. NAD  TRAUMA: ABC intact. GCS 15. FAST negative.  Head: Normocephalic, atraumatic.  Eyes: PERRL. EOMI. No Raccoon eyes.   ENT: No nasal discharge. No septal hematoma. + dry blood to nares, No Johns sign. Mucous membranes moist.  Neck: Supple. Painless ROM. No midline tenderness, stepoffs.  Cardiovascular: Normal S1, S2. Regular rate and rhythm. No murmurs, rubs, or gallops.  Pulmonary: Normal respiratory rate and effort. Lungs clear to auscultation bilaterally. No wheezing, rales, or rhonchi.  CHEST: No chest wall tenderness, crepitus.  Abdominal: Soft. distended. Nontender. No rebound, guarding, rigidity.  BACK: No midline T/L/S tenderness, stepoffs. No saddle paresthesia.  Extremities. Pelvis stable. No traumatic deformities, tenderness of extremities.  Skin: No rashes, cyanosis, lacerations, abrasions.  Neuro: AAOx3. Strength 5/5 in all extremities. Sensation intact throughout. No focal neurological deficits.  Psych: Normal mood. Normal affect.

## 2023-05-12 NOTE — CONSULT NOTE ADULT - SUBJECTIVE AND OBJECTIVE BOX
TRAUMA ACTIVATION LEVEL:  CODE / ALERT  / CONSULT  ACTIVATED BY: EMS /  ED  INTUBATED: YES / NO      MECHANISM OF INJURY:   [] Blunt     [] MVC	  [x] Fall	  [] Pedestrian Struck	  [] Motorcycle     [] Assault     [] Bicycle collision    [] Sports injury    [] Penetrating    [] Gun Shot Wound      [] Stab Wound    GCS: 15  E: 4	V: 5	M: 6    HPI: 78yM w/ PMHx of afib on Eliquis and HTN (rest of history unclear) seen as Trauma Alert s/p syncopal fall. Per a nurse translating in the ED, the patient became suddenly dizzy yesterday evening and syncopized  Trauma assessment in ED: ABCs intact , GCS 15 , AAOx3. External signs of trauma include:***      PAST MEDICAL & SURGICAL HISTORY:      Allergies    No Known Allergies    Intolerances        Home Medications:      ROS: 10-system review is otherwise negative except HPI above.      Primary Survey:    A - airway intact  B - bilateral breath sounds and good chest rise  C - palpable pulses in all extremities  D - GCS 15 on arrival, BONILLA  Exposure obtained    Vital Signs Last 24 Hrs  T(C): 36.6 (12 May 2023 00:22), Max: 36.6 (12 May 2023 00:22)  T(F): 97.8 (12 May 2023 00:22), Max: 97.8 (12 May 2023 00:22)  HR: 84 (12 May 2023 01:25) (71 - 85)  BP: 110/58 (12 May 2023 01:25) (88/53 - 110/58)  BP(mean): --  RR: 18 (12 May 2023 01:03) (17 - 18)  SpO2: 99% (12 May 2023 01:03) (96% - 99%)    Parameters below as of 12 May 2023 00:22  Patient On (Oxygen Delivery Method): room air        Secondary Survey:   General: NAD  HEENT: Normocephalic, atraumatic, EOMI, PEERLA. no scalp lacerations   Neck: Soft, midline trachea. no c-spine tenderness  Chest: No chest wall tenderness, no subcutaneous emphysema   Cardiac: S1, S2, RRR  Respiratory: Bilateral breath sounds, clear and equal bilaterally  Abdomen: Soft, non-distended, non-tender, no rebound, no guarding.  Groin: Normal appearing, pelvis stable   Ext:  Moving b/l upper and lower extremities. Palpable Radial b/l UE, b/l DP palpable in LE.   Back: No T/L/S spine tenderness, No palpable runoff/stepoff/deformity  Rectal: No genevieve blood, JESSICA with good tone    FAST: *****/Negative    ACCESS / DEVICES:  [ ] Peripheral IV  [ ] Central Venous Line	[ ] R	[ ] L	[ ] IJ	[ ] Fem	[ ] SC	Placed:   [ ] Arterial Line		[ ] R	[ ] L	[ ] Fem	[ ] Rad	[ ] Ax	Placed:   [ ] PICC:					[ ] Mediport  [ ] Urinary Catheter,  Date Placed:   [ ] Chest tube: [ ] Right, [ ] Left  [ ] COREY/Guillermo Drains    Labs:  CAPILLARY BLOOD GLUCOSE                              7.3    6.62  )-----------( 207      ( 12 May 2023 00:50 )             25.1       Auto Neutrophil %: 63.1 % (05-12-23 @ 00:50)  Auto Immature Granulocyte %: 0.5 % (05-12-23 @ 00:50)    05-12    140  |  105  |  33<H>  ----------------------------<  120<H>  3.6   |  25  |  1.0      Calcium, Total Serum: 8.1 mg/dL (05-12-23 @ 00:50)      LFTs:             5.5  | 0.5  | 11       ------------------[52      ( 12 May 2023 00:50 )  3.5  | x    | 9           Lipase:45     Amylase:x             Coags:     14.40  ----< 1.25    ( 12 May 2023 00:50 )     29.5        CARDIAC MARKERS ( 12 May 2023 00:50 )  x     / <0.01 ng/mL / x     / x     / x            Alcohol, Blood: <10 mg/dL (05-12-23 @ 00:50)            Alcohol, Blood: <10 mg/dL (05-12-23 @ 00:50)      RADIOLOGY & ADDITIONAL STUDIES:  ---------------------------------------------------------------------------------------    ASSESSMENT:  78y M  w/ PMHx of *** seen as (Code Trauma / Trauma Alert / Trauma Consult) s/p ****** with complaint of *** , external signs of trauma include *** . Trauma assessment in ED: ABCs intact , GCS 15 , AAOx3,  BONILLA. Injuries identified: ****    PLAN:     -Trauma Imaging: CXR, Pelvic Xray, CT Head,  CT C-spine, CT Max/Face, CT Chest, CT Abd/Pelvis, Extremity films (***)    - Additional consultations: Neurosurgery/Orthopedics/OMFS/ PT/Rehab/SW/Hospitalist/Medicine     - Trauma Labs to include: CBC, BMP, Coags, T&S, UA, EtOH level    - Additional items: ***    Disposition pending results of above labs and imaging (Home/Floor/Tele/SDU/SICU)  Above plan discussed with Trauma attending,  ***  , patient, patient family, and ED team  --------------------------------------------------------------------------------------  Alcohol, Blood: <10 mg/dL (05-12-23 @ 00:50)   TRAUMA ACTIVATION LEVEL:  CODE / ALERT  / CONSULT  ACTIVATED BY: EMS /  ED  INTUBATED: YES / NO      MECHANISM OF INJURY:   [] Blunt     [] MVC	  [x] Fall	  [] Pedestrian Struck	  [] Motorcycle     [] Assault     [] Bicycle collision    [] Sports injury    [] Penetrating    [] Gun Shot Wound      [] Stab Wound    GCS: 15  E: 4	V: 5	M: 6    HPI: 78yM w/ PMHx of afib on Eliquis and HTN (rest of history unclear) seen as Trauma Alert s/p syncopal fall. Per a nurse translating in the ED, the patient became suddenly dizzy yesterday evening and had a syncopal episode. ?HT, +LOC, +AC. Upon arrival to the ED, the patient had an episode of hemoptysis about 50 cc in amount. The patient denies he had hemoptysis in the past, but his wife states he was treated for an melena in February 2023. The patient denies pain since the fall. The patient denies alcohol use, but was a past smoker for the past 40 years.  Trauma assessment in ED: ABCs intact , GCS 15 , AAOx3. External signs of trauma include: dried blood to the bilateral nares.       PAST MEDICAL & SURGICAL HISTORY:      Allergies    No Known Allergies    Intolerances        Home Medications:      ROS: 10-system review is otherwise negative except HPI above.      Primary Survey:    A - airway intact  B - bilateral breath sounds and good chest rise  C - palpable pulses in all extremities  D - GCS 15 on arrival, BONILLA  Exposure obtained    Vital Signs Last 24 Hrs  T(C): 36.6 (12 May 2023 00:22), Max: 36.6 (12 May 2023 00:22)  T(F): 97.8 (12 May 2023 00:22), Max: 97.8 (12 May 2023 00:22)  HR: 84 (12 May 2023 01:25) (71 - 85)  BP: 110/58 (12 May 2023 01:25) (88/53 - 110/58)  BP(mean): --  RR: 18 (12 May 2023 01:03) (17 - 18)  SpO2: 99% (12 May 2023 01:03) (96% - 99%)    Parameters below as of 12 May 2023 00:22  Patient On (Oxygen Delivery Method): room air    Secondary Survey:   General: NAD  HEENT: Normocephalic, atraumatic, EOMI, PEERLA. no scalp lacerations, dried blood around b/l nares   Neck: Soft, midline trachea. no c-spine tenderness. Currently in [place] collar due to fall  Chest: No chest wall tenderness, no subcutaneous emphysema   Cardiac: S1, S2, RRR  Respiratory: Normal respiratory effort   Abdomen: Soft, distended, non-tender, non-peritonitic  Groin: Normal appearing, pelvis stable   Ext:  Moving b/l upper and lower extremities. Palpable Radial b/l UE, b/l DP palpable in LE.   Back: No T/L/S spine tenderness, No palpable runoff/stepoff/deformity  Rectal: No genevieve blood, JESSICA with good tone. Guaiac stool test negative for blood.      FAST: Negative    ACCESS / DEVICES:  [x] Peripheral IV  [ ] Central Venous Line	[ ] R	[ ] L	[ ] IJ	[ ] Fem	[ ] SC	Placed:   [ ] Arterial Line		[ ] R	[ ] L	[ ] Fem	[ ] Rad	[ ] Ax	Placed:   [ ] PICC:					[ ] Mediport  [ ] Urinary Catheter,  Date Placed:   [ ] Chest tube: [ ] Right, [ ] Left  [ ] COREY/Guillermo Drains    Labs:  CAPILLARY BLOOD GLUCOSE                              7.3    6.62  )-----------( 207      ( 12 May 2023 00:50 )             25.1       Auto Neutrophil %: 63.1 % (05-12-23 @ 00:50)  Auto Immature Granulocyte %: 0.5 % (05-12-23 @ 00:50)    05-12    140  |  105  |  33<H>  ----------------------------<  120<H>  3.6   |  25  |  1.0      Calcium, Total Serum: 8.1 mg/dL (05-12-23 @ 00:50)      LFTs:             5.5  | 0.5  | 11       ------------------[52      ( 12 May 2023 00:50 )  3.5  | x    | 9           Lipase:45     Amylase:x             Coags:     14.40  ----< 1.25    ( 12 May 2023 00:50 )     29.5        CARDIAC MARKERS ( 12 May 2023 00:50 )  x     / <0.01 ng/mL / x     / x     / x            Alcohol, Blood: <10 mg/dL (05-12-23 @ 00:50)    Alcohol, Blood: <10 mg/dL (05-12-23 @ 00:50)      RADIOLOGY & ADDITIONAL STUDIES:  ---------------------------------------------------------------------------------------     TRAUMA ACTIVATION LEVEL:  CODE / ALERT  / CONSULT  ACTIVATED BY: EMS /  ED  INTUBATED: YES / NO      MECHANISM OF INJURY:   [] Blunt     [] MVC	  [x] Fall	  [] Pedestrian Struck	  [] Motorcycle     [] Assault     [] Bicycle collision    [] Sports injury    [] Penetrating    [] Gun Shot Wound      [] Stab Wound    GCS: 15  E: 4	V: 5	M: 6    HPI: 78yM w/ PMHx of afib on Eliquis and HTN (rest of history unclear) seen as Trauma Alert s/p syncopal fall. Per a nurse translating in the ED, the patient became suddenly dizzy yesterday evening and had a syncopal episode. ?HT, +LOC, +AC. Upon arrival to the ED, the patient had an episode of hematemesis about 50 cc in amount. The patient denies he had hemoptysis in the past, but his wife states he was treated for an melena in February 2023. The patient denies pain since the fall. The patient denies alcohol use, but was a past smoker for the past 40 years.  Trauma assessment in ED: ABCs intact , GCS 15 , AAOx3. External signs of trauma include: dried blood to the bilateral nares.       PAST MEDICAL & SURGICAL HISTORY:      Allergies    No Known Allergies    Intolerances        Home Medications:      ROS: 10-system review is otherwise negative except HPI above.      Primary Survey:    A - airway intact  B - bilateral breath sounds and good chest rise  C - palpable pulses in all extremities  D - GCS 15 on arrival, BONILLA  Exposure obtained    Vital Signs Last 24 Hrs  T(C): 36.6 (12 May 2023 00:22), Max: 36.6 (12 May 2023 00:22)  T(F): 97.8 (12 May 2023 00:22), Max: 97.8 (12 May 2023 00:22)  HR: 84 (12 May 2023 01:25) (71 - 85)  BP: 110/58 (12 May 2023 01:25) (88/53 - 110/58)  BP(mean): --  RR: 18 (12 May 2023 01:03) (17 - 18)  SpO2: 99% (12 May 2023 01:03) (96% - 99%)    Parameters below as of 12 May 2023 00:22  Patient On (Oxygen Delivery Method): room air    Secondary Survey:   General: NAD  HEENT: Normocephalic, atraumatic, EOMI, PEERLA. no scalp lacerations, dried blood around b/l nares   Neck: Soft, midline trachea. no c-spine tenderness. Currently in [place] collar due to fall  Chest: No chest wall tenderness, no subcutaneous emphysema   Cardiac: S1, S2, RRR  Respiratory: Normal respiratory effort   Abdomen: Soft, distended, non-tender, non-peritonitic  Groin: Normal appearing, pelvis stable   Ext:  Moving b/l upper and lower extremities. Palpable Radial b/l UE, b/l DP palpable in LE.   Back: No T/L/S spine tenderness, No palpable runoff/stepoff/deformity  Rectal: No genevieve blood, JESSICA with good tone. Guaiac stool test negative for blood.      FAST: Negative    ACCESS / DEVICES:  [x] Peripheral IV  [ ] Central Venous Line	[ ] R	[ ] L	[ ] IJ	[ ] Fem	[ ] SC	Placed:   [ ] Arterial Line		[ ] R	[ ] L	[ ] Fem	[ ] Rad	[ ] Ax	Placed:   [ ] PICC:					[ ] Mediport  [ ] Urinary Catheter,  Date Placed:   [ ] Chest tube: [ ] Right, [ ] Left  [ ] COREY/Guillermo Drains    Labs:  CAPILLARY BLOOD GLUCOSE                              7.3    6.62  )-----------( 207      ( 12 May 2023 00:50 )             25.1       Auto Neutrophil %: 63.1 % (05-12-23 @ 00:50)  Auto Immature Granulocyte %: 0.5 % (05-12-23 @ 00:50)    05-12    140  |  105  |  33<H>  ----------------------------<  120<H>  3.6   |  25  |  1.0      Calcium, Total Serum: 8.1 mg/dL (05-12-23 @ 00:50)      LFTs:             5.5  | 0.5  | 11       ------------------[52      ( 12 May 2023 00:50 )  3.5  | x    | 9           Lipase:45     Amylase:x             Coags:     14.40  ----< 1.25    ( 12 May 2023 00:50 )     29.5        CARDIAC MARKERS ( 12 May 2023 00:50 )  x     / <0.01 ng/mL / x     / x     / x            Alcohol, Blood: <10 mg/dL (05-12-23 @ 00:50)    Alcohol, Blood: <10 mg/dL (05-12-23 @ 00:50)      RADIOLOGY & ADDITIONAL STUDIES:  ---------------------------------------------------------------------------------------

## 2023-05-12 NOTE — ED ADULT NURSE REASSESSMENT NOTE - NS ED NURSE REASSESS COMMENT FT1
Patient assessed bedside.  A/O x 3, son bedside.  No s/s of acute pain or distress at this time.  Cardiac monitoring maintained.  Pt to be admitted to ICU awaiting bed placement. Pt safety and comfort maintained.

## 2023-05-12 NOTE — H&P ADULT - ASSESSMENT
79yo M hx of afib on eliquis, HTN presented to the hospital for syncopal episode followed by unwitnessed fall. At the ED, had 50cc hematemesis from suspected upper GIB. Admitted to ICU    #Unwitnessed fall  #Hematemesis  #Suspected Upper GI bleed  #Microcytic anemia  #Acute type B aortic dissection (very short segment)  #Hx of HTN  #Atrial fibrillation on eliquis    NOTE incomplete 77yo M hx of afib on eliquis, HTN presented to the hospital for syncopal episode followed by unwitnessed fall. At the ED, had 50cc hematemesis from suspected upper GIB. Admitted to ICU    #Unwitnessed fall  #Hematemesis  #Suspected Upper GI bleed  #Microcytic anemia  #Acute type B aortic dissection (very short segment)  #Hx of HTN  #Atrial fibrillation on eliquis  #Hx of asthma    IMPRESSION:      PLAN:    CNS: avoid sedation    HEENT:  Oral care    PULMONARY:  HOB @ 45 degrees, aspiration precaution, cont montelukast    CARDIOVASCULAR: maintain -140, maintain A-line, cont HF and CAD meds, hold eliquis. spoke with attending- for now, labetolol IVP PRN if BP>140, TTE  EKG shows atrial fibrillation    GI: GI follow up, dc octreotide, cont PPI drip, NPO    RENAL:  F/u  lytes.  Correct as needed. accurate I/O    INFECTIOUS DISEASE: trend WBC, temperature for fever    HEMATOLOGICAL:  hold DVT ppx, cont iron daily    ENDOCRINE:  Follow up FS.  Insulin protocol if needed. Check TSH    MUSCULOSKELETAL: bedrest, fall precautions    CODE STATUS: FULL CODE    DISPOSITION: Pt requires continued monitoring in the MICU     77yo M hx of afib on eliquis, HFrEF (unknown EF), CAD s/p CABG (8 years ago), HTN, asthma presented to the hospital for syncopal episode followed by unwitnessed fall. At the ED, had 50cc hematemesis from suspected upper GIB. Admitted to ICU    #Unwitnessed fall  #Hematemesis  #Suspected Upper GI bleed  #Microcytic anemia  #Acute type B aortic dissection (very short segment)  #Hx of HTN  #Atrial fibrillation on eliquis  #Hx of asthma    IMPRESSION:      PLAN:    CNS: avoid sedation    HEENT:  Oral care    PULMONARY:  HOB @ 45 degrees, aspiration precaution, cont montelukast    CARDIOVASCULAR: maintain -140, maintain A-line, hold HF meds, hold eliquis. spoke with attending- for now, labetolol IVP PRN if BP>140, TTE  EKG shows atrial fibrillation    GI: GI follow up, dc octreotide, PPI BID, NPO    RENAL:  F/u  lytes.  Correct as needed. accurate I/O    INFECTIOUS DISEASE: trend WBC, temperature for fever    HEMATOLOGICAL:  hold DVT ppx, cont iron daily    ENDOCRINE:  Follow up FS.  Insulin protocol if needed. Check TSH    MUSCULOSKELETAL: bedrest, fall precautions    CODE STATUS: FULL CODE    DISPOSITION: Pt requires continued monitoring in the MICU     79yo M hx of afib on eliquis, HFrEF (unknown EF), CAD s/p CABG (8 years ago), HTN, asthma presented to the hospital for syncopal episode followed by unwitnessed fall. At the ED, had 50cc hematemesis from suspected upper GIB. Admitted to ICU    #Unwitnessed fall  #Hematemesis  #Suspected Upper GI bleed  #Microcytic anemia  #Acute type B aortic dissection (very short segment)  #Hx of HTN  #Atrial fibrillation on eliquis  #Hx of asthma    IMPRESSION:      PLAN:    CNS: avoid sedation    HEENT:  Oral care    PULMONARY:  HOB @ 45 degrees, aspiration precaution, cont montelukast    CARDIOVASCULAR: maintain -140, maintain A-line, hold HF meds, hold eliquis. spoke with attending- for now, labetolol IVP PRN if BP>140, TTE, trend trops, cont tele  EKG shows atrial fibrillation    GI: GI follow up, dc octreotide, PPI BID, NPO    RENAL:  F/u  lytes.  Correct as needed. accurate I/O    INFECTIOUS DISEASE: trend WBC, temperature for fever    HEMATOLOGICAL:  hold DVT ppx, cont iron daily    ENDOCRINE:  Follow up FS.  Insulin protocol if needed. Check TSH    MUSCULOSKELETAL: bedrest, fall precautions    CODE STATUS: FULL CODE    DISPOSITION: Pt requires continued monitoring in the MICU     79yo M hx of afib on eliquis, HFrEF (unknown EF), CAD s/p CABG (8 years ago), HTN, asthma presented to the hospital for syncopal episode followed by unwitnessed fall. At the ED, had 50cc hematemesis from suspected upper GIB. Admitted to ICU    #Unwitnessed fall  #Hematemesis  #Suspected Upper GI bleed  #Microcytic anemia  #Acute type B aortic dissection (very short segment)  #Hx of HTN  #Atrial fibrillation on eliquis  #Hx of asthma    IMPRESSION:      PLAN:    CNS: avoid sedation    HEENT:  Oral care    PULMONARY:  HOB @ 45 degrees, aspiration precaution, cont montelukast    CARDIOVASCULAR: maintain -140, maintain A-line, hold HF meds, hold eliquis. spoke with attending- for now, labetolol IVP PRN if BP>140, TTE, trend trops, cont tele  EKG shows atrial fibrillation    GI: GI follow up, dc octreotide, PPI BID, NPO    RENAL:  F/u  lytes.  Correct as needed. accurate I/O    INFECTIOUS DISEASE: trend WBC, temperature for fever    HEMATOLOGICAL:  hold DVT ppx, cont iron daily, trend CBC, keep Hb>7    ENDOCRINE:  Follow up FS.  Insulin protocol if needed. Check TSH    MUSCULOSKELETAL: bedrest, fall precautions    CODE STATUS: FULL CODE    DISPOSITION: Pt requires continued monitoring in the MICU

## 2023-05-12 NOTE — CONSULT NOTE ADULT - ASSESSMENT
ASSESSMENT:  78yM w/ PMHx of afib on Eliquis and HTN (rest of history unclear) seen as Trauma Alert s/p syncopal fall. Per a nurse translating in the ED, the patient became suddenly dizzy yesterday evening and had a syncopal episode. ?HT, +LOC, +AC. Upon arrival to the ED, the patient had an episode of hemoptysis about 50 cc in amount. The patient denies he had hemoptysis in the past, but his wife states he was treated for an melena in February 2023. The patient denies pain since the fall. The patient denies alcohol use, but was a past smoker for the past 40 years.  Trauma assessment in ED: ABCs intact , GCS 15 , AAOx3. External signs of trauma include: dried blood to the bilateral nares.   After episode of hemoptysis, the patient became hypotensive with a BP systolic of 89 -->85, and 2 units of prbc's were ordered and given with improvement in the patient's BP to 112 systolic. The patient was taken to CT scan, where the trauma workup was started. Pending final reads of CT scans, but from the CTA Chest the patient appears to have an ascending aortic aneurysm.     PLAN:     -Trauma Imaging: CXR, Pelvic Xray, CT Head,  CT C-spine, CT Max/Face, CT Chest, CT Abd/Pelvis, Extremity films (***)    - Additional consultations: CT Surgery, GI    - Trauma Labs to include: CBC, BMP, Coags, T&S, UA, EtOH level    Disposition pending results of above labs and imaging (Home/Floor/Tele/SDU/SICU)  Above plan discussed with Trauma attending,  ***  , patient, patient family, and ED team  --------------------------------------------------------------------------------------  Alcohol, Blood: <10 mg/dL (05-12-23 @ 00:50) ASSESSMENT:  78yM w/ PMHx of afib on Eliquis and HTN (rest of history unclear) seen as Trauma Alert s/p syncopal fall. Per a nurse translating in the ED, the patient became suddenly dizzy yesterday evening and had a syncopal episode. ?HT, +LOC, +AC. Upon arrival to the ED, the patient had an episode of hemoptysis about 50 cc in amount. The patient denies he had hemoptysis in the past, but his wife states he was treated for an melena in February 2023. The patient denies pain since the fall. The patient denies alcohol use, but was a past smoker for the past 40 years.  Trauma assessment in ED: ABCs intact , GCS 15 , AAOx3. External signs of trauma include: dried blood to the bilateral nares.   After episode of hemoptysis, the patient became hypotensive with a BP systolic of 89 -->85, and 2 units of prbc's were ordered and given with improvement in the patient's BP to 112 systolic. The patient was taken to CT scan for a CTA C/A/P. CTA C/A/P showed a very short acute type B aortic dissection originating immediately after the L subclavian artery takeoff from the aortic arch extending only a few centimeters along the aortic arch. Dissection flap does not extend into the descending thoracic aorta or into any branch vessels with no evidence of acute traumatic injury to the chest/abdomen, or pelvis. No evidence of arterial extravasation into the upper GI tract or esophagus.   In the ED, the patient received 2 units of blood for hypotension and a fluid bolus with improvement in his BP to a systolic BP of 112. CT surgery was called, and is recommending vascular surgery to evaluate for the Type B dissection.     Plan:  - No traumatic injuries on PE or CTA C/A/P   - Disposition per ED  - Follow up vascular surgery consult  - Will require BP control for Type B aortic dissection    ASSESSMENT:  78yM w/ PMHx of afib on Eliquis and HTN (rest of history unclear) seen as Trauma Alert s/p syncopal fall. Per a nurse translating in the ED, the patient became suddenly dizzy yesterday evening and had a syncopal episode. ?HT, +LOC, +AC. Upon arrival to the ED, the patient had an episode of hematemesis about 50 cc in amount. The patient denies he had hemoptysis in the past, but his wife states he was treated for an melena in February 2023. The patient denies pain since the fall. The patient denies alcohol use, but was a past smoker for the past 40 years.  Trauma assessment in ED: ABCs intact , GCS 15 , AAOx3. External signs of trauma include: dried blood to the bilateral nares.   After episode of hemoptysis, the patient became hypotensive with a BP systolic of 89 -->85, and 2 units of prbc's were ordered and given with improvement in the patient's BP to 112 systolic. The patient was taken to CT scan for a CTA C/A/P. CTA C/A/P showed a very short acute type B aortic dissection originating immediately after the L subclavian artery takeoff from the aortic arch extending only a few centimeters along the aortic arch. Dissection flap does not extend into the descending thoracic aorta or into any branch vessels with no evidence of acute traumatic injury to the chest/abdomen, or pelvis. No evidence of arterial extravasation into the upper GI tract or esophagus.   In the ED, the patient received 2 units of blood for hypotension and a fluid bolus with improvement in his BP to a systolic BP of 112. CT surgery was called, and is recommending vascular surgery to evaluate for the Type B dissection.     Plan:  - No traumatic injuries on PE or CTA C/A/P   - Disposition per ED  - Follow up vascular surgery consult  - Will require BP control for Type B aortic dissection

## 2023-05-12 NOTE — ED PROVIDER NOTE - PROGRESS NOTE DETAILS
pk: trauma alert called on arrival, trauma team bedside, blood products initiated, pt will be taken for CTA. started on octreotide, ppi drip, ceftriaxone, k centra pk: labs and CTA reviewed, disc with gi fellow, vascular and CT surgery, will admit to ICU. vascular surgery and CT surgery offer no interventions at this time.

## 2023-05-12 NOTE — ED ADULT NURSE NOTE - OBJECTIVE STATEMENT
via  [facility]- pt was in the bathroom and passed out with positive LOC- while assessment was in progress pt threw up coffee ground blood with large clots

## 2023-05-12 NOTE — ED ADULT TRIAGE NOTE - CHIEF COMPLAINT QUOTE
Pt came s/p unwitnessed fall in the bathroom, +LOC, pt was c/o dizziness before the fall, pt is usually alert and oriented x 3, pt is lethargic and confused in triage. Pt c/o throat pain and had bloody nose. Unknown if on anticoagulants.

## 2023-05-12 NOTE — CONSULT NOTE ADULT - ASSESSMENT
**************INCOMPLETE NOTE*********************    78 yr old male with h  x of chronic Afib on Eliquis, HFrEF, CAD s/p CABG (8 years ago), HTN, asthma presented to the hospital for syncopal episode followed by unwitnessed fall.    Cardiology consulted for stratification for Endoscopy    # Acute blood loss anemia secondary to suspected Upper GIB   # Syncope   # Acute type B aortic dissection (very short segment)  # Hx of HTN  # Chronic Atrial fibrillation (on Eliquis  # Hx of asthma- trauma work up negative   - hemodynamically stable   - Hb 7.3 --> 8.6 (s/p 1U pRBC)  - trop neg x1  -   - EKG (05/11/23): Afib   - check ECHO  - CT Angio Abdomen and Pelvis w/ IV Cont (05.12.23): Very short segment acute type B aortic dissection originating immediately after the left subclavian artery takeoff from the aortic arch   extending only a few centimeters along the aortic arch. Dissection flap does not extend into the descending thoracic aorta or into any branch vessels  - no recent ACS, no CHF, no evidence of malignant arrhythmia   - METS  - RCRI  - pending echo to risk stratify pt    **************INCOMPLETE NOTE********************* **************INCOMPLETE NOTE*********************    78 yr old male with hx of chronic Afib on Eliquis, HFrEF, CAD s/p CABG (8 years ago), HTN, asthma presented to the hospital for syncopal episode followed by unwitnessed fall.    Cardiology consulted for stratification for Endoscopy    # Acute blood loss anemia secondary to suspected Upper GIB   # Syncope   # Acute type B aortic dissection (very short segment)  # Hx of HTN  # Chronic Atrial fibrillation (on Eliquis  # Hx of asthma- trauma work up negative   - hemodynamically stable   - Hb 7.3 --> 8.6 (s/p 1U pRBC)  - trop neg x1  -   - EKG (05/11/23): Afib   - check ECHO  - CT Angio Abdomen and Pelvis w/ IV Cont (05.12.23): Very short segment acute type B aortic dissection originating immediately after the left subclavian artery takeoff from the aortic arch   extending only a few centimeters along the aortic arch. Dissection flap does not extend into the descending thoracic aorta or into any branch vessels  - no recent ACS, no CHF, no evidence of malignant arrhythmia   - METS  - RCRI  - pending echo to risk stratify pt    **************INCOMPLETE NOTE********************* **************INCOMPLETE NOTE*********************    78 yr old male with hx of chronic Afib on Eliquis, HFrEF, CAD s/p CABG (8 years ago), HTN, asthma presented to the hospital for syncopal episode followed by unwitnessed fall.    Cardiology consulted for stratification for Endoscopy    # Acute blood loss anemia secondary to suspected Upper GIB   # Syncope   # Hx of Gastric Ulcer (diagnosed by EGD in Feb 2023)  # Acute type B aortic dissection (very short segment)  # Hx of HTN  # Chronic Atrial fibrillation (on Eliquis  # Hx of asthma  - trauma work up negative   - hemodynamically stable --> Bp: 127/71  - Hb 7.3 --> 8.6 (s/p 1U pRBC)  - trop neg x1  -   - EKG (05/11/23): Afib   - check ECHO  - CXR": congested   - CT Angio Abdomen and Pelvis w/ IV Cont (05.12.23): Very short segment acute type B aortic dissection originating immediately after the left subclavian artery takeoff from the aortic arch extending only a few centimeters along the aortic arch. Dissection flap does not extend into the descending thoracic aorta or into any branch vessels  - start IV Lasix 40mg q12   - restart home Entresto if bp stable   - no recent ACS, no CHF, no evidence of malignant arrhythmia   - METS >4  - RCRI 2  - pending echo to risk stratify pt    **************INCOMPLETE NOTE********************* **************INCOMPLETE NOTE*********************    78 yr old male with hx of chronic Afib on Eliquis, HFrEF, CAD s/p CABG (8 years ago), HTN, asthma presented to the hospital for syncopal episode followed by unwitnessed fall.    Cardiology consulted for stratification for Endoscopy    # Acute blood loss anemia secondary to suspected Upper GIB   # Syncope   # Hx of Gastric Ulcer (diagnosed by EGD in Feb 2023)  # Acute type B aortic dissection (very short segment)  # Hx of HTN  # Chronic Atrial fibrillation (on Eliquis  # Hx of asthma  - trauma work up negative   - hemodynamically stable --> Bp: 127/71  - Hb 7.3 --> 8.6 (s/p 2U pRBC)  - trop neg x1  -   - EKG (05/11/23): Afib   - check ECHO  - CXR: congested   - CT Angio Abdomen and Pelvis w/ IV Cont (05.12.23): Very short segment acute type B aortic dissection originating immediately after the left subclavian artery takeoff from the aortic arch extending only a few centimeters along the aortic arch. Dissection flap does not extend into the descending thoracic aorta or into any branch vessels  - start IV Lasix 40mg q12   - restart home Entresto if bp stable   - restart eliquis once cleared by primary team   - no recent ACS, no CHF, no evidence of malignant arrhythmia   - METS >4  - RCRI 2  - pending echo to risk stratify pt    **************INCOMPLETE NOTE********************* 78 yr old male with hx of chronic Afib on Eliquis, HFrEF, CAD s/p CABG (8 years ago), HTN, asthma presented to the hospital for syncopal episode followed by unwitnessed fall.    Cardiology consulted for stratification for Endoscopy    # Acute blood loss anemia secondary to suspected Upper GIB   # Syncope   # Hx of Gastric Ulcer (diagnosed by EGD in Feb 2023)  # Acute type B aortic dissection (very short segment)  # Hx of HTN  # Chronic Atrial fibrillation (on Eliquis  # Hx of asthma  - trauma work up negative   - hemodynamically stable --> Bp: 127/71  - Hb 7.3 --> 8.6 (s/p 2U pRBC)  - trop neg x1  -   - EKG (05/11/23): Afib   - check ECHO  - CXR: congested   - CT Angio Abdomen and Pelvis w/ IV Cont (05.12.23): Very short segment acute type B aortic dissection originating immediately after the left subclavian artery takeoff from the aortic arch extending only a few centimeters along the aortic arch. Dissection flap does not extend into the descending thoracic aorta or into any branch vessels  - start IV Lasix 40mg q12   - restart home Entresto if bp stable   - restart Eliquis once cleared by primary team   - no recent ACS, no CHF, no evidence of malignant arrhythmia   - given acute aortic dissection, pt is at high risk for any procedure  - start esmolol ggt to titrate to a MAP 60 - 65 and HR 50 - 60   - if if there is eminent threat to life --> then pt at high risk for MACE for any procedure

## 2023-05-12 NOTE — CONSULT NOTE ADULT - SUBJECTIVE AND OBJECTIVE BOX
Patient is a 78y old  Male who presents with a chief complaint of upper GI bleed (12 May 2023 04:16)      HPI:  77yo M hx of afib on eliquis, HFrEF (unknown EF), CAD s/p CABG (8 years ago), HTN, asthma presented to the hospital for syncopal episode after feeling dizzy yesterday evening.    Patient speaks a dialect similar to Cantonese but is a village dialect. Patient is able to understand Cantonese, but 100%  will not be able to understand the patient's language. I understood 100% of patient's conversation.     Patient was feeling dizzy while walking to the bathroom at 10PM  followed by a fall. +LOC, + AC. Unsure if he had head trauma, but does not experience any headaches. Unwitnessed by family. Denies fevers, shortness of breath, chest pain, abdominal pain, diarrhea, constipation, dysuria.     Upon arrival to the ED, patient was complaining of having productive sputum needing to cough, epistaxis, and followed by ~50cc of hematemesis. Brought into hospital via EMS    At the ED, trauma alert was activated due to the fall. Dried blood found in the nares. VS was T-97.8F, BP: 97/54, HR: 71bpm, RR: 17bpm, SpO2: 96% on RA. After episode of hemoptysis, patient's SBP dropped from 89-->85. 2u prbc were ordered resulting in improvement of BP. Labs significant for microcytic anemia (Hb- 7.3), BUN- 33. troponin<0.01 x1.     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  CTA Abd+Pelvis: no acute pathology  Xray Pelvis: no acute fracture    Patient admitted to ICU for Upper GI bleed. Currently, VS stable: with SBP 110s. Given 2u prbc. started octrotide and protonix infusion. s/p ceftriaxone and erythromycin IV. given K centra 5g. Vascular surgery consulted for aortic dissection. No acute surgical intervention, Recommend keep -140 SBP.  per ED signout. GI consutled for GI Bleed. Plan for colonoscopy in today.    (12 May 2023 04:16)      PAST MEDICAL & SURGICAL HISTORY:  Hypertension      CAD (coronary artery disease)      S/P CABG x 1      Unspecified atrial fibrillation      Chronic systolic congestive heart failure      Asthma          Occupational hx:    Social hx:    FAMILY HISTORY:  :  No known cardiovascular family history    ROS:  See HPI     Allergies    No Known Allergies    Intolerances          PHYSICAL EXAM    ICU Vital Signs Last 24 Hrs  T(C): 36.7 (12 May 2023 06:45), Max: 36.7 (12 May 2023 06:45)  T(F): 98 (12 May 2023 06:45), Max: 98 (12 May 2023 06:45)  HR: 79 (12 May 2023 06:45) (71 - 86)  BP: 127/71 (12 May 2023 05:30) (88/53 - 136/64)  ABP: 127/63 (12 May 2023 06:45) (127/63 - 127/71)  ABP(mean): 87 (12 May 2023 06:45) (87 - 87)  RR: 21 (12 May 2023 06:45) (17 - 21)  SpO2: 97% (12 May 2023 08:31) (95% - 99%)    O2 Parameters below as of 12 May 2023 08:31  Patient On (Oxygen Delivery Method): room air            CONSTITUTIONAL:  Ill appearing. Well nourished.  NAD    ENT:   Airway patent,   No thrush    EYES:   Clear bilaterally,   pupils equal,   round and reactive to light.    CARDIAC:   Normal rate,   regular rhythm.    no edema    RESPIRATORY:   No wheezing  Normal chest expansion  Not tachypneic,  No use of accessory muscles    GASTROINTESTINAL:  Abdomen soft,   non-tender,   no guarding,   + BS    MUSCULOSKELETAL:   range of motion is not limited,  no clubbing, cyanosis    NEUROLOGICAL:   Alert  Confused  no motor deficits.    SKIN:   Skin normal color for race,         LABS:                          8.6    6.91  )-----------( 190      ( 12 May 2023 07:05 )             28.5                                               05-12    139  |  110  |  34<H>  ----------------------------<  109<H>  4.1   |  21  |  0.8    Ca    7.5<L>      12 May 2023 07:05  Mg     1.9     05-12    TPro  5.4<L>  /  Alb  3.4<L>  /  TBili  0.7  /  DBili  x   /  AST  11  /  ALT  8   /  AlkPhos  51  05-12      PT/INR - ( 12 May 2023 00:50 )   PT: 14.40 sec;   INR: 1.25 ratio         PTT - ( 12 May 2023 00:50 )  PTT:29.5 sec                                           CARDIAC MARKERS ( 12 May 2023 07:05 )  x     / <0.01 ng/mL / x     / x     / x      CARDIAC MARKERS ( 12 May 2023 00:50 )  x     / <0.01 ng/mL / x     / x     / x                                                LIVER FUNCTIONS - ( 12 May 2023 07:05 )  Alb: 3.4 g/dL / Pro: 5.4 g/dL / ALK PHOS: 51 U/L / ALT: 8 U/L / AST: 11 U/L / GGT: x                                                                                                                                  CXR reviewed by myself    MEDICATIONS  (STANDING):  atorvastatin 20 milliGRAM(s) Oral at bedtime  benzonatate 200 milliGRAM(s) Oral three times a day  ferrous    sulfate 325 milliGRAM(s) Oral daily  montelukast 10 milliGRAM(s) Oral daily  pantoprazole  Injectable 40 milliGRAM(s) IV Push every 12 hours  tamsulosin 0.4 milliGRAM(s) Oral at bedtime    MEDICATIONS  (PRN):

## 2023-05-12 NOTE — CONSULT NOTE ADULT - ASSESSMENT
77yo M hx of afib on eliquis, HFrEF (unknown EF), CAD s/p CABG (8 years ago), HTN, asthma presented to the hospital for syncopal episode after feeling dizzy yesterday evening. Patient was feeling dizzy while walking to the bathroom at 10PM  followed by a fall. +LOC. Denies fevers, shortness of breath, , abdominal pain, diarrhea, melena, hematochezia or constipation. Upon arrival to the ED, patient was complaining of having productive sputum needing to cough, epistaxis, and followed by ~50cc of hematemesis. Labs significant for microcytic anemia (Hb- 7.3), gi consulted to r/o upper gi bleeding.     # Reported hematemesis R/O Upper GIB   - microcytic anemia 7.3 > 8.6   - hx of bleeding gastric ulcer 2/2023  - BUN 34 Cr 0.8   - CTAP no acute pathology   - hx of HUMAIRA on iron supplement   - JESSICA brown stool     PLAN:   Clear liquids diet   PPI BID   EGD next week after medical optimization or earlier in setting of unstable bleed   Cardiology risk stratification  obtain records from [facility]  hold  if ok with cardiology   dissection management per vascular and ICU team   will follow    79yo M hx of afib on eliquis, HFrEF (unknown EF), CAD s/p CABG (8 years ago), HTN, asthma presented to the hospital for syncopal episode after feeling dizzy yesterday evening. Patient was feeling dizzy while walking to the bathroom at 10PM  followed by a fall. +LOC. Denies fevers, shortness of breath, , abdominal pain, diarrhea, melena, hematochezia or constipation. Upon arrival to the ED, patient was complaining of having productive sputum needing to cough, epistaxis, and followed by ~50cc of hematemesis. Labs significant for microcytic anemia (Hb- 7.3), gi consulted to r/o upper gi bleeding.     # Reported hematemesis R/O Upper GIB   Given small amount of blood described and history of preceding epistaxis, reported hematemesis is likely secondary to epistaxis   - microcytic anemia 7.3 > 8.6   - hx of bleeding gastric ulcer 2/2023  - BUN 34 Cr 0.8   - CTAP no acute pathology   - hx of HUMAIRA on iron supplement   - JESSICA brown stool     PLAN:   Clear liquids diet   PPI BID   Will consider EGD next week after medical optimization if benefit outweighs risks, or earlier in setting of unstable secondary to GI bleed   Cardiology risk stratification  obtain records from [facility]  Aortic dissection management per vascular and ICU team   will follow

## 2023-05-13 LAB
ALBUMIN SERPL ELPH-MCNC: 3.4 G/DL — LOW (ref 3.5–5.2)
ALP SERPL-CCNC: 49 U/L — SIGNIFICANT CHANGE UP (ref 30–115)
ALT FLD-CCNC: 9 U/L — SIGNIFICANT CHANGE UP (ref 0–41)
ANION GAP SERPL CALC-SCNC: 5 MMOL/L — LOW (ref 7–14)
AST SERPL-CCNC: 12 U/L — SIGNIFICANT CHANGE UP (ref 0–41)
BASOPHILS # BLD AUTO: 0.03 K/UL — SIGNIFICANT CHANGE UP (ref 0–0.2)
BASOPHILS NFR BLD AUTO: 0.7 % — SIGNIFICANT CHANGE UP (ref 0–1)
BILIRUB SERPL-MCNC: 0.7 MG/DL — SIGNIFICANT CHANGE UP (ref 0.2–1.2)
BUN SERPL-MCNC: 25 MG/DL — HIGH (ref 10–20)
CALCIUM SERPL-MCNC: 8.1 MG/DL — LOW (ref 8.4–10.4)
CHLORIDE SERPL-SCNC: 109 MMOL/L — SIGNIFICANT CHANGE UP (ref 98–110)
CO2 SERPL-SCNC: 24 MMOL/L — SIGNIFICANT CHANGE UP (ref 17–32)
CREAT SERPL-MCNC: 0.9 MG/DL — SIGNIFICANT CHANGE UP (ref 0.7–1.5)
EGFR: 89 ML/MIN/1.73M2 — SIGNIFICANT CHANGE UP
EOSINOPHIL # BLD AUTO: 0.17 K/UL — SIGNIFICANT CHANGE UP (ref 0–0.7)
EOSINOPHIL NFR BLD AUTO: 4.2 % — SIGNIFICANT CHANGE UP (ref 0–8)
GLUCOSE SERPL-MCNC: 105 MG/DL — HIGH (ref 70–99)
HCT VFR BLD CALC: 26.1 % — LOW (ref 42–52)
HCT VFR BLD CALC: 26.4 % — LOW (ref 42–52)
HCV AB S/CO SERPL IA: 0.04 COI — SIGNIFICANT CHANGE UP
HCV AB SERPL-IMP: SIGNIFICANT CHANGE UP
HGB BLD-MCNC: 7.9 G/DL — LOW (ref 14–18)
HGB BLD-MCNC: 7.9 G/DL — LOW (ref 14–18)
IMM GRANULOCYTES NFR BLD AUTO: 0.2 % — SIGNIFICANT CHANGE UP (ref 0.1–0.3)
LYMPHOCYTES # BLD AUTO: 0.61 K/UL — LOW (ref 1.2–3.4)
LYMPHOCYTES # BLD AUTO: 15.2 % — LOW (ref 20.5–51.1)
MAGNESIUM SERPL-MCNC: 2.1 MG/DL — SIGNIFICANT CHANGE UP (ref 1.8–2.4)
MCHC RBC-ENTMCNC: 21.9 PG — LOW (ref 27–31)
MCHC RBC-ENTMCNC: 22.1 PG — LOW (ref 27–31)
MCHC RBC-ENTMCNC: 29.9 G/DL — LOW (ref 32–37)
MCHC RBC-ENTMCNC: 30.3 G/DL — LOW (ref 32–37)
MCV RBC AUTO: 72.9 FL — LOW (ref 80–94)
MCV RBC AUTO: 73.1 FL — LOW (ref 80–94)
MONOCYTES # BLD AUTO: 0.35 K/UL — SIGNIFICANT CHANGE UP (ref 0.1–0.6)
MONOCYTES NFR BLD AUTO: 8.7 % — SIGNIFICANT CHANGE UP (ref 1.7–9.3)
NEUTROPHILS # BLD AUTO: 2.85 K/UL — SIGNIFICANT CHANGE UP (ref 1.4–6.5)
NEUTROPHILS NFR BLD AUTO: 71 % — SIGNIFICANT CHANGE UP (ref 42.2–75.2)
NRBC # BLD: 0 /100 WBCS — SIGNIFICANT CHANGE UP (ref 0–0)
NRBC # BLD: 0 /100 WBCS — SIGNIFICANT CHANGE UP (ref 0–0)
PLATELET # BLD AUTO: 162 K/UL — SIGNIFICANT CHANGE UP (ref 130–400)
PLATELET # BLD AUTO: 164 K/UL — SIGNIFICANT CHANGE UP (ref 130–400)
PMV BLD: 9.2 FL — SIGNIFICANT CHANGE UP (ref 7.4–10.4)
PMV BLD: 9.4 FL — SIGNIFICANT CHANGE UP (ref 7.4–10.4)
POTASSIUM SERPL-MCNC: 3.5 MMOL/L — SIGNIFICANT CHANGE UP (ref 3.5–5)
POTASSIUM SERPL-SCNC: 3.5 MMOL/L — SIGNIFICANT CHANGE UP (ref 3.5–5)
PROT SERPL-MCNC: 5.3 G/DL — LOW (ref 6–8)
RBC # BLD: 3.58 M/UL — LOW (ref 4.7–6.1)
RBC # BLD: 3.61 M/UL — LOW (ref 4.7–6.1)
RBC # FLD: 21.9 % — HIGH (ref 11.5–14.5)
RBC # FLD: 22 % — HIGH (ref 11.5–14.5)
SODIUM SERPL-SCNC: 138 MMOL/L — SIGNIFICANT CHANGE UP (ref 135–146)
WBC # BLD: 4.02 K/UL — LOW (ref 4.8–10.8)
WBC # BLD: 4.22 K/UL — LOW (ref 4.8–10.8)
WBC # FLD AUTO: 4.02 K/UL — LOW (ref 4.8–10.8)
WBC # FLD AUTO: 4.22 K/UL — LOW (ref 4.8–10.8)

## 2023-05-13 PROCEDURE — 71045 X-RAY EXAM CHEST 1 VIEW: CPT | Mod: 26

## 2023-05-13 PROCEDURE — 99233 SBSQ HOSP IP/OBS HIGH 50: CPT

## 2023-05-13 RX ORDER — POTASSIUM CHLORIDE 20 MEQ
20 PACKET (EA) ORAL ONCE
Refills: 0 | Status: COMPLETED | OUTPATIENT
Start: 2023-05-13 | End: 2023-05-13

## 2023-05-13 RX ORDER — CHLORHEXIDINE GLUCONATE 213 G/1000ML
1 SOLUTION TOPICAL DAILY
Refills: 0 | Status: DISCONTINUED | OUTPATIENT
Start: 2023-05-13 | End: 2023-05-20

## 2023-05-13 RX ORDER — ESMOLOL HCL 100MG/10ML
50 VIAL (ML) INTRAVENOUS
Qty: 2500 | Refills: 0 | Status: DISCONTINUED | OUTPATIENT
Start: 2023-05-13 | End: 2023-05-14

## 2023-05-13 RX ADMIN — Medication 30.3 MICROGRAM(S)/KG/MIN: at 10:33

## 2023-05-13 RX ADMIN — TAMSULOSIN HYDROCHLORIDE 0.4 MILLIGRAM(S): 0.4 CAPSULE ORAL at 21:44

## 2023-05-13 RX ADMIN — Medication 200 MILLIGRAM(S): at 05:40

## 2023-05-13 RX ADMIN — ATORVASTATIN CALCIUM 20 MILLIGRAM(S): 80 TABLET, FILM COATED ORAL at 21:46

## 2023-05-13 RX ADMIN — Medication 50 MILLIEQUIVALENT(S): at 11:35

## 2023-05-13 RX ADMIN — MONTELUKAST 10 MILLIGRAM(S): 4 TABLET, CHEWABLE ORAL at 11:49

## 2023-05-13 RX ADMIN — PANTOPRAZOLE SODIUM 40 MILLIGRAM(S): 20 TABLET, DELAYED RELEASE ORAL at 17:07

## 2023-05-13 RX ADMIN — PANTOPRAZOLE SODIUM 40 MILLIGRAM(S): 20 TABLET, DELAYED RELEASE ORAL at 05:39

## 2023-05-13 RX ADMIN — Medication 325 MILLIGRAM(S): at 11:49

## 2023-05-13 RX ADMIN — CHLORHEXIDINE GLUCONATE 1 APPLICATION(S): 213 SOLUTION TOPICAL at 11:50

## 2023-05-13 RX ADMIN — Medication 200 MILLIGRAM(S): at 21:45

## 2023-05-13 RX ADMIN — Medication 200 MILLIGRAM(S): at 13:18

## 2023-05-13 RX ADMIN — SODIUM CHLORIDE 75 MILLILITER(S): 9 INJECTION, SOLUTION INTRAVENOUS at 10:33

## 2023-05-13 NOTE — CONSULT NOTE ADULT - CONSULT REASON
syncopeand fall
S/P Syncopal Fall
acute GI bleed
Aortic Dissection
type B aortic dissection
Aortic dissection  syncope
stratification for Endoscopy

## 2023-05-13 NOTE — CONSULT NOTE ADULT - ASSESSMENT
IMPRESSION:  Syncope  Type B aortic dissection  Rule out UGI bleed  HO CHF   HO CAD s/p CABG  HO Afib on eliquis          PLAN:      CNS: avoid sedation    HEENT: Oral care    PULMONARY:  HOB @ 45 degrees.  Aspiration precautions,     CARDIOVASCULAR: avoid volume overload, MAP adequate, Esmolol drip, repeat CXR, may need lasix    GI: GI prophylaxis. clear,     RENAL:  Follow up lytes.  Correct as needed    INFECTIOUS DISEASE: Follow up cultures, procalcitonin    HEMATOLOGICAL:  DVT prophylaxis.     ENDOCRINE:  Follow up FS.  Insulin protocol if needed.    MUSCULOSKELETAL: bedrest    MICU             IMPRESSION:  Syncope  Type B aortic dissection  Rule out UGI bleed  HO CHF   HO CAD s/p CABG  HO Afib on eliquis          PLAN:      CNS: avoid sedatio n    HEENT: Oral care    PULMONARY:  HOB @ 45 degrees.  Aspiration precautions,     CARDIOVASCULAR: avoid volume overload, MAP adequate, Esmolol drip, repeat CXR, may need lasix    GI: GI prophylaxis. clear,     RENAL:  Follow up lytes.  Correct as needed    INFECTIOUS DISEASE: Follow up cultures, procalcitonin    HEMATOLOGICAL:  DVT prophylaxis.     ENDOCRINE:  Follow up FS.  Insulin protocol if needed.    MUSCULOSKELETAL: bedrest    MICU             IMPRESSION:  Syncope  Type B aortic dissection  Rule out UGI bleed  HO CHF   HO CAD s/p CABG  HO Afib on eliquis          PLAN:      CNS: avoid sedation    HEENT: Oral care    PULMONARY:  HOB @ 45 degrees.  Aspiration precautions,     CARDIOVASCULAR: avoid volume overload, MAP adequate,     GI: GI prophylaxis. ,     RENAL:  Follow up lytes.  Correct as needed    INFECTIOUS DISEASE: Follow up cultures, procalcitonin    HEMATOLOGICAL:  DVT prophylaxis.     ENDOCRINE:  Follow up FS.  Insulin protocol if needed.    MUSCULOSKELETAL: bedrest    MICU

## 2023-05-13 NOTE — CONSULT NOTE ADULT - CONSULT REQUESTED DATE/TIME
11-May-2023 03:00
12-May-2023 04:12
12-May-2023 21:57
12-May-2023 01:57
13-May-2023 08:42
12-May-2023 08:53
12-May-2023 09:07

## 2023-05-13 NOTE — CONSULT NOTE ADULT - ATTENDING COMMENTS
IMPRESSION:    Acute upper GI bleed  Type B aortic dissection  Unwitnessed fall Trauma work up negative   HO atrial fibrillation on Eliquis  HO asthma  HO HFrEF, CAD s/p CABG 2015, HTN    Plan as outlined above
I edited the note
Attending Statement: I have personally performed a face to face diagnostic evaluation on this patient. The patient is suffering from:  Syncope  Type B aortic dissection  Rule out UGI bleed  I have made amendments to the documentation where necessary. I have personally seen and examined this patient.  I have fully participated in the care of this patient.  I have reviewed all pertinent clinical information, including history, physical exam, plan and note.
CAD s/p CABG  AF    Hospitalized with symptomatic anemia.  Hemoptysis / epistaxis v. hematemesis (unclear etiology with history of PUD).  s/p PRBC transfusion.  No recurrence.  Hemodynamics stable.    Short acute Type B aortic dissection just distal to left subclavian.    No chest pain.  Mildly hypertensive.  HR 70-80's.    ECHO: nL LVSF.  Mild to Moderate AI.  Mild MR.    In absence of life threatening bleeding, would clinically stabilize prior to endoscopy.    - Start Esmolol (goal HR 50's as tolerated)  - Add Nicardipine if remains hypertensive (goal -120 as tolerated)  - Hold anticoagulation.

## 2023-05-13 NOTE — CONSULT NOTE ADULT - SUBJECTIVE AND OBJECTIVE BOX
Patient is a 78y old  Male who presents with a chief complaint of upper GI bleed (12 May 2023 21:54)      HPI:  79yo M hx of afib on eliquis, HFrEF (unknown EF), CAD s/p CABG (8 years ago), HTN, asthma presented to the hospital for syncopal episode after feeling dizzy yesterday evening.    Patient speaks a dialect similar to Cantonese but is a village dialect. Patient is able to understand Cantonese, but 100%  will not be able to understand the patient's language. I understood 100% of patient's conversation.     Patient was feeling dizzy while walking to the bathroom at 10PM  followed by a fall. +LOC, + AC. Unsure if he had head trauma, but does not experience any headaches. Unwitnessed by family. Denies fevers, shortness of breath, chest pain, abdominal pain, diarrhea, constipation, dysuria.     Upon arrival to the ED, patient was complaining of having productive sputum needing to cough, epistaxis, and followed by ~50cc of hematemesis. Brought into hospital via EMS    At the ED, trauma alert was activated due to the fall. Dried blood found in the nares. VS was T-97.8F, BP: 97/54, HR: 71bpm, RR: 17bpm, SpO2: 96% on RA. After episode of hemoptysis, patient's SBP dropped from 89-->85. 2u prbc were ordered resulting in improvement of BP. Labs significant for microcytic anemia (Hb- 7.3), BUN- 33. troponin<0.01 x1.     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  CTA Abd+Pelvis: no acute pathology  Xray Pelvis: no acute fracture    Patient admitted to ICU for Upper GI bleed. Currently, VS stable: with SBP 110s. Given 2u prbc. started octrotide and protonix infusion. s/p ceftriaxone and erythromycin IV. given K centra 5g. Vascular surgery consulted for aortic dissection. No acute surgical intervention, Recommend keep -140 SBP.  per ED signout. GI consutled for GI Bleed. Plan for colonoscopy in today.    (12 May 2023 04:16)      PAST MEDICAL & SURGICAL HISTORY:  Hypertension      CAD (coronary artery disease)      S/P CABG x 1      Unspecified atrial fibrillation      Chronic systolic congestive heart failure      Asthma          SOCIAL HX:   ex smoker                            FAMILY HISTORY:  :  No known cardiovacular family hisotry     Review Of Systems:     All ROS are negative except per HPI       Allergies    No Known Allergies    Intolerances          PHYSICAL EXAM    ICU Vital Signs Last 24 Hrs  T(C): 37.3 (13 May 2023 04:00), Max: 37.3 (13 May 2023 04:00)  T(F): 99.2 (13 May 2023 04:00), Max: 99.2 (13 May 2023 04:00)  HR: 76 (13 May 2023 06:00) (67 - 83)  BP: --  BP(mean): --  ABP: 110/53 (13 May 2023 06:00) (97/45 - 136/64)  ABP(mean): 75 (13 May 2023 06:00) (64 - 92)  RR: 20 (13 May 2023 06:00) (14 - 23)  SpO2: 94% (13 May 2023 06:00) (92% - 99%)    O2 Parameters below as of 13 May 2023 07:00  Patient On (Oxygen Delivery Method): room air            CONSTITUTIONAL:  NAD    ENT:   Airway patent,   Mouth with normal mucosa.     CARDIAC:   Rate controlled   No edema    RESPIRATORY:   No wheezing  Bilateral BS   Not tachypneic,  No use of accessory muscles    GASTROINTESTINAL:  Abdomen soft,   Non-tender,   No guarding,   + BS      NEUROLOGICAL:   Alert and oriented   No motor deficits.    SKIN:   Skin normal color for race,   No evidence of rash.              05-12-23 @ 07:01  -  05-13-23 @ 07:00  --------------------------------------------------------  IN:    Lactated Ringers: 1725 mL    Oral Fluid: 960 mL  Total IN: 2685 mL    OUT:    Voided (mL): 1375 mL  Total OUT: 1375 mL    Total NET: 1310 mL          LABS:                          7.9    4.02  )-----------( 162      ( 13 May 2023 05:03 )             26.1                                               05-13    138  |  109  |  25<H>  ----------------------------<  105<H>  3.5   |  24  |  0.9    Ca    8.1<L>      13 May 2023 05:03  Mg     2.1     05-13    TPro  5.3<L>  /  Alb  3.4<L>  /  TBili  0.7  /  DBili  x   /  AST  12  /  ALT  9   /  AlkPhos  49  05-13      PT/INR - ( 12 May 2023 00:50 )   PT: 14.40 sec;   INR: 1.25 ratio         PTT - ( 12 May 2023 00:50 )  PTT:29.5 sec                                           CARDIAC MARKERS ( 12 May 2023 12:20 )  x     / <0.01 ng/mL / x     / x     / x      CARDIAC MARKERS ( 12 May 2023 07:05 )  x     / <0.01 ng/mL / x     / x     / x      CARDIAC MARKERS ( 12 May 2023 00:50 )  x     / <0.01 ng/mL / x     / x     / x                                                LIVER FUNCTIONS - ( 13 May 2023 05:03 )  Alb: 3.4 g/dL / Pro: 5.3 g/dL / ALK PHOS: 49 U/L / ALT: 9 U/L / AST: 12 U/L / GGT: x                                                                                                                                       X-Rays                                                                            ECHO      MEDICATIONS  (STANDING):  atorvastatin 20 milliGRAM(s) Oral at bedtime  benzonatate 200 milliGRAM(s) Oral three times a day  chlorhexidine 2% Cloths 1 Application(s) Topical daily  ferrous    sulfate 325 milliGRAM(s) Oral daily  lactated ringers. 1000 milliLiter(s) (75 mL/Hr) IV Continuous <Continuous>  montelukast 10 milliGRAM(s) Oral daily  pantoprazole  Injectable 40 milliGRAM(s) IV Push every 12 hours  potassium chloride  20 mEq/100 mL IVPB 20 milliEquivalent(s) IV Intermittent once  tamsulosin 0.4 milliGRAM(s) Oral at bedtime    MEDICATIONS  (PRN):         Patient is a 78y old  Male who presents with a chief complaint of upper GI bleed (12 May 2023 21:54)      HPI:  79yo M hx of afib on eliquis, HFrEF (unknown EF), CAD s/p CABG (8 years ago), HTN, asthma presented to the hospital for syncopal episode after feeling dizzy yesterday evening.    Patient speaks a dialect similar to Cantonese but is a village dialect. Patient is able to understand Cantonese, but 100%  will not be able to understand the patient's language. I understood 100% of patient's conversation.     Patient was feeling dizzy while walking to the bathroom at 10PM  followed by a fall. +LOC, + AC. Unsure if he had head trauma, but does not experience any headaches. Unwitnessed by family. Denies fevers, shortness of breath, chest pain, abdominal pain, diarrhea, constipation, dysuria.     Upon arrival to the ED, patient was complaining of having productive sputum needing to cough, epistaxis, and followed by ~50cc of hematemesis. Brought into hospital via EMS    At the ED, trauma alert was activated due to the fall. Dried blood found in the nares. VS was T-97.8F, BP: 97/54, HR: 71bpm, RR: 17bpm, SpO2: 96% on RA. After episode of hemoptysis, patient's SBP dropped from 89-->85. 2u prbc were ordered resulting in improvement of BP. Labs significant for microcytic anemia (Hb- 7.3), BUN- 33. troponin<0.01 x1.     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  CTA Abd+Pelvis: no acute pathology  Xray Pelvis: no acute fracture    Patient admitted to ICU for Upper GI bleed. Currently, VS stable: with SBP 110s. Given 2u prbc. started octreotide and protonix infusion. s/p ceftriaxone and erythromycin IV. given K centra 5g. Vascular surgery consulted for aortic dissection. No acute surgical intervention, Recommend keep -140 SBP.  per ED signout. GI consulted for GI Bleed. Plan for colonoscopy in today.    (12 May 2023 04:16)      PAST MEDICAL & SURGICAL HISTORY:  Hypertension      CAD (coronary artery disease)      S/P CABG x 1      Unspecified atrial fibrillation      Chronic systolic congestive heart failure      Asthma          SOCIAL HX:   ex smoker                            FAMILY HISTORY:  :  No known cardiovacular family hisotry     Review Of Systems:     All ROS are negative except per HPI       Allergies    No Known Allergies    Intolerances          PHYSICAL EXAM    ICU Vital Signs Last 24 Hrs  T(C): 37.3 (13 May 2023 04:00), Max: 37.3 (13 May 2023 04:00)  T(F): 99.2 (13 May 2023 04:00), Max: 99.2 (13 May 2023 04:00)  HR: 76 (13 May 2023 06:00) (67 - 83)  BP: --  BP(mean): --  ABP: 110/53 (13 May 2023 06:00) (97/45 - 136/64)  ABP(mean): 75 (13 May 2023 06:00) (64 - 92)  RR: 20 (13 May 2023 06:00) (14 - 23)  SpO2: 94% (13 May 2023 06:00) (92% - 99%)    O2 Parameters below as of 13 May 2023 07:00  Patient On (Oxygen Delivery Method): room air            CONSTITUTIONAL:  NAD    ENT:   Airway patent,   Mouth with normal mucosa.     CARDIAC:   Rate controlled   No edema    RESPIRATORY:   No wheezing  Bilateral BS   Not tachypneic,  No use of accessory muscles    GASTROINTESTINAL:  Abdomen soft,   Non-tender,   No guarding,   + BS      NEUROLOGICAL:   Alert and oriented   No motor deficits.    SKIN:   Skin normal color for race,   No evidence of rash.              05-12-23 @ 07:01  -  05-13-23 @ 07:00  --------------------------------------------------------  IN:    Lactated Ringers: 1725 mL    Oral Fluid: 960 mL  Total IN: 2685 mL    OUT:    Voided (mL): 1375 mL  Total OUT: 1375 mL    Total NET: 1310 mL          LABS:                          7.9    4.02  )-----------( 162      ( 13 May 2023 05:03 )             26.1                                               05-13    138  |  109  |  25<H>  ----------------------------<  105<H>  3.5   |  24  |  0.9    Ca    8.1<L>      13 May 2023 05:03  Mg     2.1     05-13    TPro  5.3<L>  /  Alb  3.4<L>  /  TBili  0.7  /  DBili  x   /  AST  12  /  ALT  9   /  AlkPhos  49  05-13      PT/INR - ( 12 May 2023 00:50 )   PT: 14.40 sec;   INR: 1.25 ratio         PTT - ( 12 May 2023 00:50 )  PTT:29.5 sec                                           CARDIAC MARKERS ( 12 May 2023 12:20 )  x     / <0.01 ng/mL / x     / x     / x      CARDIAC MARKERS ( 12 May 2023 07:05 )  x     / <0.01 ng/mL / x     / x     / x      CARDIAC MARKERS ( 12 May 2023 00:50 )  x     / <0.01 ng/mL / x     / x     / x                                                LIVER FUNCTIONS - ( 13 May 2023 05:03 )  Alb: 3.4 g/dL / Pro: 5.3 g/dL / ALK PHOS: 49 U/L / ALT: 9 U/L / AST: 12 U/L / GGT: x                                                                                                                                       X-Rays                                                                            ECHO      MEDICATIONS  (STANDING):  atorvastatin 20 milliGRAM(s) Oral at bedtime  benzonatate 200 milliGRAM(s) Oral three times a day  chlorhexidine 2% Cloths 1 Application(s) Topical daily  ferrous    sulfate 325 milliGRAM(s) Oral daily  lactated ringers. 1000 milliLiter(s) (75 mL/Hr) IV Continuous <Continuous>  montelukast 10 milliGRAM(s) Oral daily  pantoprazole  Injectable 40 milliGRAM(s) IV Push every 12 hours  potassium chloride  20 mEq/100 mL IVPB 20 milliEquivalent(s) IV Intermittent once  tamsulosin 0.4 milliGRAM(s) Oral at bedtime    MEDICATIONS  (PRN):

## 2023-05-14 LAB
ALBUMIN SERPL ELPH-MCNC: 3.4 G/DL — LOW (ref 3.5–5.2)
ALP SERPL-CCNC: 49 U/L — SIGNIFICANT CHANGE UP (ref 30–115)
ALT FLD-CCNC: 11 U/L — SIGNIFICANT CHANGE UP (ref 0–41)
ANION GAP SERPL CALC-SCNC: 8 MMOL/L — SIGNIFICANT CHANGE UP (ref 7–14)
AST SERPL-CCNC: 14 U/L — SIGNIFICANT CHANGE UP (ref 0–41)
BASOPHILS # BLD AUTO: 0.03 K/UL — SIGNIFICANT CHANGE UP (ref 0–0.2)
BASOPHILS NFR BLD AUTO: 0.7 % — SIGNIFICANT CHANGE UP (ref 0–1)
BILIRUB SERPL-MCNC: 0.8 MG/DL — SIGNIFICANT CHANGE UP (ref 0.2–1.2)
BLD GP AB SCN SERPL QL: SIGNIFICANT CHANGE UP
BUN SERPL-MCNC: 16 MG/DL — SIGNIFICANT CHANGE UP (ref 10–20)
CALCIUM SERPL-MCNC: 8 MG/DL — LOW (ref 8.4–10.4)
CHLORIDE SERPL-SCNC: 107 MMOL/L — SIGNIFICANT CHANGE UP (ref 98–110)
CO2 SERPL-SCNC: 22 MMOL/L — SIGNIFICANT CHANGE UP (ref 17–32)
CREAT SERPL-MCNC: 0.9 MG/DL — SIGNIFICANT CHANGE UP (ref 0.7–1.5)
EGFR: 89 ML/MIN/1.73M2 — SIGNIFICANT CHANGE UP
EOSINOPHIL # BLD AUTO: 0.13 K/UL — SIGNIFICANT CHANGE UP (ref 0–0.7)
EOSINOPHIL NFR BLD AUTO: 3.1 % — SIGNIFICANT CHANGE UP (ref 0–8)
GLUCOSE SERPL-MCNC: 92 MG/DL — SIGNIFICANT CHANGE UP (ref 70–99)
HCT VFR BLD CALC: 25.9 % — LOW (ref 42–52)
HGB BLD-MCNC: 7.9 G/DL — LOW (ref 14–18)
IMM GRANULOCYTES NFR BLD AUTO: 0.2 % — SIGNIFICANT CHANGE UP (ref 0.1–0.3)
LYMPHOCYTES # BLD AUTO: 0.76 K/UL — LOW (ref 1.2–3.4)
LYMPHOCYTES # BLD AUTO: 18 % — LOW (ref 20.5–51.1)
MAGNESIUM SERPL-MCNC: 1.9 MG/DL — SIGNIFICANT CHANGE UP (ref 1.8–2.4)
MCHC RBC-ENTMCNC: 22.3 PG — LOW (ref 27–31)
MCHC RBC-ENTMCNC: 30.5 G/DL — LOW (ref 32–37)
MCV RBC AUTO: 73.2 FL — LOW (ref 80–94)
MONOCYTES # BLD AUTO: 0.34 K/UL — SIGNIFICANT CHANGE UP (ref 0.1–0.6)
MONOCYTES NFR BLD AUTO: 8.1 % — SIGNIFICANT CHANGE UP (ref 1.7–9.3)
NEUTROPHILS # BLD AUTO: 2.95 K/UL — SIGNIFICANT CHANGE UP (ref 1.4–6.5)
NEUTROPHILS NFR BLD AUTO: 69.9 % — SIGNIFICANT CHANGE UP (ref 42.2–75.2)
NRBC # BLD: 0 /100 WBCS — SIGNIFICANT CHANGE UP (ref 0–0)
PLATELET # BLD AUTO: 166 K/UL — SIGNIFICANT CHANGE UP (ref 130–400)
PMV BLD: 9.1 FL — SIGNIFICANT CHANGE UP (ref 7.4–10.4)
POTASSIUM SERPL-MCNC: 3.7 MMOL/L — SIGNIFICANT CHANGE UP (ref 3.5–5)
POTASSIUM SERPL-SCNC: 3.7 MMOL/L — SIGNIFICANT CHANGE UP (ref 3.5–5)
PROT SERPL-MCNC: 5.2 G/DL — LOW (ref 6–8)
RBC # BLD: 3.54 M/UL — LOW (ref 4.7–6.1)
RBC # FLD: 22.4 % — HIGH (ref 11.5–14.5)
SODIUM SERPL-SCNC: 137 MMOL/L — SIGNIFICANT CHANGE UP (ref 135–146)
WBC # BLD: 4.22 K/UL — LOW (ref 4.8–10.8)
WBC # FLD AUTO: 4.22 K/UL — LOW (ref 4.8–10.8)

## 2023-05-14 PROCEDURE — 99233 SBSQ HOSP IP/OBS HIGH 50: CPT

## 2023-05-14 RX ORDER — ESMOLOL HCL 100MG/10ML
49.95 VIAL (ML) INTRAVENOUS
Qty: 2500 | Refills: 0 | Status: DISCONTINUED | OUTPATIENT
Start: 2023-05-14 | End: 2023-05-17

## 2023-05-14 RX ORDER — ACETAMINOPHEN 500 MG
650 TABLET ORAL ONCE
Refills: 0 | Status: COMPLETED | OUTPATIENT
Start: 2023-05-14 | End: 2023-05-14

## 2023-05-14 RX ADMIN — Medication 200 MILLIGRAM(S): at 21:03

## 2023-05-14 RX ADMIN — PANTOPRAZOLE SODIUM 40 MILLIGRAM(S): 20 TABLET, DELAYED RELEASE ORAL at 06:09

## 2023-05-14 RX ADMIN — SODIUM CHLORIDE 50 MILLILITER(S): 9 INJECTION, SOLUTION INTRAVENOUS at 21:04

## 2023-05-14 RX ADMIN — Medication 325 MILLIGRAM(S): at 12:32

## 2023-05-14 RX ADMIN — Medication 650 MILLIGRAM(S): at 04:21

## 2023-05-14 RX ADMIN — ATORVASTATIN CALCIUM 20 MILLIGRAM(S): 80 TABLET, FILM COATED ORAL at 21:03

## 2023-05-14 RX ADMIN — Medication 200 MILLIGRAM(S): at 13:21

## 2023-05-14 RX ADMIN — Medication 30.3 MICROGRAM(S)/KG/MIN: at 21:04

## 2023-05-14 RX ADMIN — Medication 650 MILLIGRAM(S): at 04:28

## 2023-05-14 RX ADMIN — TAMSULOSIN HYDROCHLORIDE 0.4 MILLIGRAM(S): 0.4 CAPSULE ORAL at 21:03

## 2023-05-14 RX ADMIN — PANTOPRAZOLE SODIUM 40 MILLIGRAM(S): 20 TABLET, DELAYED RELEASE ORAL at 17:16

## 2023-05-14 RX ADMIN — MONTELUKAST 10 MILLIGRAM(S): 4 TABLET, CHEWABLE ORAL at 12:32

## 2023-05-14 RX ADMIN — CHLORHEXIDINE GLUCONATE 1 APPLICATION(S): 213 SOLUTION TOPICAL at 12:33

## 2023-05-14 RX ADMIN — Medication 200 MILLIGRAM(S): at 06:09

## 2023-05-14 NOTE — PROGRESS NOTE ADULT - ASSESSMENT
IMPRESSION:  Syncope, 1 episode on presentation, no repeat episodes  Type B aortic dissection  Possible UGIB, Hgb stable  HO CHF   HO CAD s/p CABG  HO Afib, holding eliquis    PLAN:    CNS: Avoid CNS depressants.     HEENT: Oral care    PULMONARY:  HOB @ 45 degrees. Aspiration precautions. target SpO2 92-96%. CT Surgery FU.    CARDIOVASCULAR: Avoid volume overload. MAP>60. Dec LR to 50 cc/hr. Cardiac clearance as per GI.     GI: GI prophylaxis. Advance diet from clears to GI soft, low fat, low residue. GI following.     RENAL: Follow up lytes. Correct as needed.     INFECTIOUS DISEASE: Follow up cultures, procalcitonin. No abx for now.     HEMATOLOGICAL:  DVT prophylaxis. LE duplex. Trend cbc q12h, transfuse if Hgb<7, maintain active type and screen.     ENDOCRINE:  Follow up FS.     MUSCULOSKELETAL: OOB to chair.     MICU

## 2023-05-14 NOTE — PROGRESS NOTE ADULT - SUBJECTIVE AND OBJECTIVE BOX
Internal Medicine Progress Note    Location: [address]   Patient Name: ZAID DU  MRN: [id number]    Patient is a 78y old  Male who presents with a chief complaint of upper GI bleed (13 May 2023 08:42)      Subjective  no acute events overnight    Objective  Vital Signs Last 24 Hrs  T(C): 36.4 (14 May 2023 00:00), Max: 37.2 (13 May 2023 20:00)  T(F): 97.6 (14 May 2023 00:00), Max: 98.9 (13 May 2023 20:00)  HR: 64 (14 May 2023 06:00) (58 - 82)  BP: --  BP(mean): --  RR: 20 (14 May 2023 06:00) (12 - 32)  SpO2: 95% (14 May 2023 06:00) (93% - 100%)    Parameters below as of 14 May 2023 06:00  Patient On (Oxygen Delivery Method): room air        Intake/output   05-13-23 @ 07:01  -  05-14-23 @ 07:00  --------------------------------------------------------  IN: 3770 mL / OUT: 1450 mL / NET: 2320 mL        Physical Exam  General: nontoxic, NAD  Neck: supple  Chest/lung: nonlabored respirations on RA, CTAB  Heart: RRR, +S1 S2  Abdomen: soft, nontender, nondistended  Extremities: no clubbing/cyanosis   Neuro: no gross focal deficits  Psych: awake alert       Labs  CBC:                       7.9    4.22  )-----------( 166      ( 14 May 2023 04:24 )             25.9     BMP: 05-14    137  |  107  |  16  ----------------------------<  92  3.7   |  22  |  0.9    Ca    8.0<L>      14 May 2023 04:24  Mg     1.9     05-14    TPro  5.2<L>  /  Alb  3.4<L>  /  TBili  0.8  /  DBili  x   /  AST  14  /  ALT  11  /  AlkPhos  49  05-14    Coag:   ABG:   Cardiac markers: CARDIAC MARKERS ( 12 May 2023 12:20 )  x     / <0.01 ng/mL / x     / x     / x            Micro:        Imaging:    Standing Medications  MEDICATIONS  (STANDING):  atorvastatin 20 milliGRAM(s) Oral at bedtime  benzonatate 200 milliGRAM(s) Oral three times a day  chlorhexidine 2% Cloths 1 Application(s) Topical daily  esmolol  Infusion 50 MICROgram(s)/kG/Min (30.3 mL/Hr) IV Continuous <Continuous>  ferrous    sulfate 325 milliGRAM(s) Oral daily  lactated ringers. 1000 milliLiter(s) (75 mL/Hr) IV Continuous <Continuous>  montelukast 10 milliGRAM(s) Oral daily  pantoprazole  Injectable 40 milliGRAM(s) IV Push every 12 hours  tamsulosin 0.4 milliGRAM(s) Oral at bedtime    PRN Medications  MEDICATIONS  (PRN):

## 2023-05-14 NOTE — PROGRESS NOTE ADULT - ASSESSMENT
79yo M hx of afib on eliquis, HFrEF (unknown EF), CAD s/p CABG (8 years ago), HTN, asthma presented to the hospital for syncopal episode followed by unwitnessed fall. At the ED, had 50cc hematemesis from suspected upper GIB. Admitted to ICU    #Suspected Upper GI Bleed  #Hematemesis  #Acute blood loss anemia  - pt presented with hematemesis, epistaxis, and fall associated with hypotension  - Hgb 7.3 received 2u RBC, repeat Hgb stable  - GI following -- plan for EGD next week, clear liquid diet, advance as tolerated  - IV PPI BID  - transfuse for Hgb < 7.0  - ferrous sulfate 325mg qd   - LR 75 cc/hr until eating    #Unwitnessed fall  - trauma workup CTH, CT spine, CT A/P, XR pelvis negative   - 5/12 echo: EF 68%, mild-mod AR, mild MR, mild TR, mild pHTN  - fall precautions    #Acute type B aortic dissection (very short segment)  - CTA chest aorta w/wo IV contrast: very short segment acute type B aortic dissection 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 extending few cm from aortic arch, no evidence of acute traumatic injury, arterial extravasation  - Vascular surgery consulted: strict BP control < 140, no acute surgery, hold all AC for GIB  - keep SBP<140, esmolol drip if needed   - CT Surgery consulted, rec BP control, fluid resuscitation for hypovolemia; keep HR < 80  - trop negative     #HFrEF (unknown EF)  #CAD s/p CABG   - at home is on lasix 20, entresto, aldactone  - hold lasix, entresto, aldactone  - repeat 5/12 echo: EF 68%, mild-mod AR, mild MR, mild TR, mild pHTN  - statin    #Paroxysmal afib on eliquis  - currently rate controlled without rate control agents   - hold AC for GIB  - keep HR < 80     #Hx of asthma  - not in exacerbation  - SpO2 98% on RA, no wheezing   - c/w montelukast     Misc  - DVT ppx: held for GIB  - GI ppx: IV PPI BID  - Diet: clears, advance as tolerated  - Activity: IAT  - Code status: FULL  - Dispo: MICU

## 2023-05-14 NOTE — PROGRESS NOTE ADULT - SUBJECTIVE AND OBJECTIVE BOX
Patient is a 78y old  Male who presents with a chief complaint of upper GI bleed (14 May 2023 07:36)        Over Night Events:  No overnight events. No repeat bloody BM's.     Drips  Esmolol-50  LR- 75 cc/hr      ROS:     All pertinent ROS are negative except HPI         PHYSICAL EXAM    ICU Vital Signs Last 24 Hrs  T(C): 36.8 (14 May 2023 08:00), Max: 37.2 (13 May 2023 20:00)  T(F): 98.3 (14 May 2023 08:00), Max: 98.9 (13 May 2023 20:00)  HR: 66 (14 May 2023 08:00) (58 - 82)  ABP: 127/63 (14 May 2023 08:00) (89/72 - 132/69)  ABP(mean): 86 (14 May 2023 08:00) (62 - 108)  RR: 22 (14 May 2023 08:00) (12 - 32)  SpO2: 97% (14 May 2023 08:00) (93% - 100%)    O2 Parameters below as of 14 May 2023 08:00  Patient On (Oxygen Delivery Method): room air        CONSTITUTIONAL:   Ill appearing. NAD    ENT:   Airway patent,   Mouth with normal mucosa.   No thrush    EYES:   Pupils equal,   Round and reactive to light.    CARDIAC:   Normal rate,   Regular rhythm.    No edema    RESPIRATORY:   No wheezing  Bilateral BS  Normal chest expansion  Not tachypneic,  No use of accessory muscles    GASTROINTESTINAL:  Abdomen soft,   Non-tender,   No guarding,   + BS    GENITOURINARY  normal genitalia for sex  no edema    MUSCULOSKELETAL:   Range of motion is not limited,  No clubbing, cyanosis    NEUROLOGICAL:   AOx4  Follows commands  Moves all extremities     SKIN:   Skin normal color for race,   Warm and dry  No evidence of rash.    PSYCHIATRIC:   No apparent risk to self or others.        05-13-23 @ 07:01  -  05-14-23 @ 07:00  --------------------------------------------------------  IN:    Esmolol: 600 mL    Lactated Ringers: 1730 mL    Oral Fluid: 1440 mL  Total IN: 3770 mL    OUT:    Voided (mL): 1450 mL  Total OUT: 1450 mL    Total NET: 2320 mL        LABS:                            7.9    4.22  )-----------( 166      ( 14 May 2023 04:24 )             25.9                                               05-14    137  |  107  |  16  ----------------------------<  92  3.7   |  22  |  0.9    Ca    8.0<L>      14 May 2023 04:24  Mg     1.9     05-14    TPro  5.2<L>  /  Alb  3.4<L>  /  TBili  0.8  /  DBili  x   /  AST  14  /  ALT  11  /  AlkPhos  49  05-14                                                 CARDIAC MARKERS ( 12 May 2023 12:20 )  x     / <0.01 ng/mL / x     / x     / x                                                LIVER FUNCTIONS - ( 14 May 2023 04:24 )  Alb: 3.4 g/dL / Pro: 5.2 g/dL / ALK PHOS: 49 U/L / ALT: 11 U/L / AST: 14 U/L / GGT: x                                                                                      MEDICATIONS  (STANDING):  atorvastatin 20 milliGRAM(s) Oral at bedtime  benzonatate 200 milliGRAM(s) Oral three times a day  chlorhexidine 2% Cloths 1 Application(s) Topical daily  esmolol  Infusion 50 MICROgram(s)/kG/Min (30.3 mL/Hr) IV Continuous <Continuous>  ferrous    sulfate 325 milliGRAM(s) Oral daily  lactated ringers. 1000 milliLiter(s) (75 mL/Hr) IV Continuous <Continuous>  montelukast 10 milliGRAM(s) Oral daily  pantoprazole  Injectable 40 milliGRAM(s) IV Push every 12 hours  tamsulosin 0.4 milliGRAM(s) Oral at bedtime    MEDICATIONS  (PRN):      New X-rays reviewed:                                                                                  ECHO    CXR interpreted by me:  stable

## 2023-05-15 LAB
ABO RH CONFIRMATION: SIGNIFICANT CHANGE UP
ALBUMIN SERPL ELPH-MCNC: 3.5 G/DL — SIGNIFICANT CHANGE UP (ref 3.5–5.2)
ALP SERPL-CCNC: 52 U/L — SIGNIFICANT CHANGE UP (ref 30–115)
ALT FLD-CCNC: 10 U/L — SIGNIFICANT CHANGE UP (ref 0–41)
ANION GAP SERPL CALC-SCNC: 10 MMOL/L — SIGNIFICANT CHANGE UP (ref 7–14)
AST SERPL-CCNC: 14 U/L — SIGNIFICANT CHANGE UP (ref 0–41)
BASOPHILS # BLD AUTO: 0.03 K/UL — SIGNIFICANT CHANGE UP (ref 0–0.2)
BASOPHILS NFR BLD AUTO: 0.7 % — SIGNIFICANT CHANGE UP (ref 0–1)
BILIRUB SERPL-MCNC: 0.9 MG/DL — SIGNIFICANT CHANGE UP (ref 0.2–1.2)
BUN SERPL-MCNC: 16 MG/DL — SIGNIFICANT CHANGE UP (ref 10–20)
CALCIUM SERPL-MCNC: 7.6 MG/DL — LOW (ref 8.4–10.5)
CHLORIDE SERPL-SCNC: 106 MMOL/L — SIGNIFICANT CHANGE UP (ref 98–110)
CO2 SERPL-SCNC: 21 MMOL/L — SIGNIFICANT CHANGE UP (ref 17–32)
CREAT SERPL-MCNC: 1 MG/DL — SIGNIFICANT CHANGE UP (ref 0.7–1.5)
EGFR: 78 ML/MIN/1.73M2 — SIGNIFICANT CHANGE UP
EOSINOPHIL # BLD AUTO: 0.1 K/UL — SIGNIFICANT CHANGE UP (ref 0–0.7)
EOSINOPHIL NFR BLD AUTO: 2.4 % — SIGNIFICANT CHANGE UP (ref 0–8)
GLUCOSE SERPL-MCNC: 88 MG/DL — SIGNIFICANT CHANGE UP (ref 70–99)
HCT VFR BLD CALC: 26.3 % — LOW (ref 42–52)
HCT VFR BLD CALC: 27 % — LOW (ref 42–52)
HGB BLD-MCNC: 8 G/DL — LOW (ref 14–18)
HGB BLD-MCNC: 8.1 G/DL — LOW (ref 14–18)
IMM GRANULOCYTES NFR BLD AUTO: 0.2 % — SIGNIFICANT CHANGE UP (ref 0.1–0.3)
LYMPHOCYTES # BLD AUTO: 0.57 K/UL — LOW (ref 1.2–3.4)
LYMPHOCYTES # BLD AUTO: 13.7 % — LOW (ref 20.5–51.1)
MAGNESIUM SERPL-MCNC: 2 MG/DL — SIGNIFICANT CHANGE UP (ref 1.8–2.4)
MCHC RBC-ENTMCNC: 22 PG — LOW (ref 27–31)
MCHC RBC-ENTMCNC: 22.1 PG — LOW (ref 27–31)
MCHC RBC-ENTMCNC: 30 G/DL — LOW (ref 32–37)
MCHC RBC-ENTMCNC: 30.4 G/DL — LOW (ref 32–37)
MCV RBC AUTO: 72.7 FL — LOW (ref 80–94)
MCV RBC AUTO: 73.4 FL — LOW (ref 80–94)
MONOCYTES # BLD AUTO: 0.46 K/UL — SIGNIFICANT CHANGE UP (ref 0.1–0.6)
MONOCYTES NFR BLD AUTO: 11 % — HIGH (ref 1.7–9.3)
NEUTROPHILS # BLD AUTO: 3 K/UL — SIGNIFICANT CHANGE UP (ref 1.4–6.5)
NEUTROPHILS NFR BLD AUTO: 72 % — SIGNIFICANT CHANGE UP (ref 42.2–75.2)
NRBC # BLD: 0 /100 WBCS — SIGNIFICANT CHANGE UP (ref 0–0)
NRBC # BLD: 0 /100 WBCS — SIGNIFICANT CHANGE UP (ref 0–0)
PLATELET # BLD AUTO: 205 K/UL — SIGNIFICANT CHANGE UP (ref 130–400)
PLATELET # BLD AUTO: 217 K/UL — SIGNIFICANT CHANGE UP (ref 130–400)
PMV BLD: 9.5 FL — SIGNIFICANT CHANGE UP (ref 7.4–10.4)
PMV BLD: 9.7 FL — SIGNIFICANT CHANGE UP (ref 7.4–10.4)
POTASSIUM SERPL-MCNC: 3.8 MMOL/L — SIGNIFICANT CHANGE UP (ref 3.5–5)
POTASSIUM SERPL-SCNC: 3.8 MMOL/L — SIGNIFICANT CHANGE UP (ref 3.5–5)
PROCALCITONIN SERPL-MCNC: 0.08 NG/ML — SIGNIFICANT CHANGE UP (ref 0.02–0.1)
PROT SERPL-MCNC: 5.4 G/DL — LOW (ref 6–8)
RBC # BLD: 3.62 M/UL — LOW (ref 4.7–6.1)
RBC # BLD: 3.68 M/UL — LOW (ref 4.7–6.1)
RBC # FLD: 22.8 % — HIGH (ref 11.5–14.5)
RBC # FLD: 23.5 % — HIGH (ref 11.5–14.5)
SODIUM SERPL-SCNC: 137 MMOL/L — SIGNIFICANT CHANGE UP (ref 135–146)
WBC # BLD: 4.17 K/UL — LOW (ref 4.8–10.8)
WBC # BLD: 5.67 K/UL — SIGNIFICANT CHANGE UP (ref 4.8–10.8)
WBC # FLD AUTO: 4.17 K/UL — LOW (ref 4.8–10.8)
WBC # FLD AUTO: 5.67 K/UL — SIGNIFICANT CHANGE UP (ref 4.8–10.8)

## 2023-05-15 PROCEDURE — 99232 SBSQ HOSP IP/OBS MODERATE 35: CPT | Mod: GC

## 2023-05-15 PROCEDURE — 93970 EXTREMITY STUDY: CPT | Mod: 26

## 2023-05-15 PROCEDURE — 99233 SBSQ HOSP IP/OBS HIGH 50: CPT

## 2023-05-15 RX ORDER — METOPROLOL TARTRATE 50 MG
50 TABLET ORAL EVERY 8 HOURS
Refills: 0 | Status: DISCONTINUED | OUTPATIENT
Start: 2023-05-15 | End: 2023-05-19

## 2023-05-15 RX ORDER — METOPROLOL TARTRATE 50 MG
50 TABLET ORAL
Refills: 0 | Status: DISCONTINUED | OUTPATIENT
Start: 2023-05-15 | End: 2023-05-15

## 2023-05-15 RX ORDER — SACUBITRIL AND VALSARTAN 24; 26 MG/1; MG/1
1 TABLET, FILM COATED ORAL
Refills: 0 | Status: DISCONTINUED | OUTPATIENT
Start: 2023-05-15 | End: 2023-05-20

## 2023-05-15 RX ADMIN — TAMSULOSIN HYDROCHLORIDE 0.4 MILLIGRAM(S): 0.4 CAPSULE ORAL at 21:08

## 2023-05-15 RX ADMIN — ATORVASTATIN CALCIUM 20 MILLIGRAM(S): 80 TABLET, FILM COATED ORAL at 21:08

## 2023-05-15 RX ADMIN — Medication 50 MILLIGRAM(S): at 18:08

## 2023-05-15 RX ADMIN — MONTELUKAST 10 MILLIGRAM(S): 4 TABLET, CHEWABLE ORAL at 11:45

## 2023-05-15 RX ADMIN — Medication 30.3 MICROGRAM(S)/KG/MIN: at 21:46

## 2023-05-15 RX ADMIN — PANTOPRAZOLE SODIUM 40 MILLIGRAM(S): 20 TABLET, DELAYED RELEASE ORAL at 18:08

## 2023-05-15 RX ADMIN — PANTOPRAZOLE SODIUM 40 MILLIGRAM(S): 20 TABLET, DELAYED RELEASE ORAL at 05:13

## 2023-05-15 RX ADMIN — SACUBITRIL AND VALSARTAN 1 TABLET(S): 24; 26 TABLET, FILM COATED ORAL at 18:08

## 2023-05-15 RX ADMIN — Medication 325 MILLIGRAM(S): at 11:45

## 2023-05-15 RX ADMIN — Medication 200 MILLIGRAM(S): at 05:13

## 2023-05-15 RX ADMIN — CHLORHEXIDINE GLUCONATE 1 APPLICATION(S): 213 SOLUTION TOPICAL at 21:08

## 2023-05-15 NOTE — PROGRESS NOTE ADULT - ASSESSMENT
77yo M hx of afib on eliquis, HFrEF (unknown EF), CAD s/p CABG (8 years ago), HTN, asthma presented to the hospital for syncopal episode after feeling dizzy yesterday evening. Patient was feeling dizzy while walking to the bathroom at 10PM  followed by a fall. +LOC. Denies fevers, shortness of breath, , abdominal pain, diarrhea, melena, hematochezia or constipation. Upon arrival to the ED, patient was complaining of having productive sputum needing to cough, epistaxis, and followed by ~50cc of hematemesis. Labs significant for microcytic anemia (Hb- 7.3), gi consulted to r/o upper gi bleeding.     # Reported hematemesis R/O Upper GIB   Given small amount of blood described and history of preceding epistaxis, reported hematemesis is likely secondary to epistaxis   - microcytic anemia 7.3 > 8.6   - hx of bleeding gastric ulcer 2/2023  - BUN 34 Cr 0.8   - CTAP no acute pathology   - hx of HUMAIRA on iron supplement   - JESSICA brown stool   -   PLAN:   Regular Diet as tolerated   PPI BID   EGD when patient is more stable (Risk outweighs the benefits given no active bleed now). Appreciate cardio recs.   obtain records from [facility]  Aortic dissection management per vascular and ICU team   will follow    79yo M hx of afib on eliquis, HFrEF (unknown EF), CAD s/p CABG (8 years ago), HTN, asthma presented to the hospital for syncopal episode after feeling dizzy yesterday evening. Patient was feeling dizzy while walking to the bathroom at 10PM  followed by a fall. +LOC. Denies fevers, shortness of breath, , abdominal pain, diarrhea, melena, hematochezia or constipation. Upon arrival to the ED, patient was complaining of having productive sputum needing to cough, epistaxis, and followed by ~50cc of hematemesis. Labs significant for microcytic anemia (Hb- 7.3), gi consulted to r/o upper gi bleeding.     # Reported hematemesis R/O Upper GIB   Given small amount of blood described and history of preceding epistaxis, reported hematemesis is likely secondary to epistaxis   - microcytic anemia 7.3 > 8.6   - hx of bleeding gastric ulcer 2/2023  - BUN 34 Cr 0.8   - CTAP no acute pathology   - hx of HUMAIRA on iron supplement   - JESSICA brown stool   -   PLAN:   Diet as tolerated   PPI BID   EGD when patient is more stable (risk outweighs the benefits given no active bleed now). Appreciate cardio recs.   obtain records from [facility]  Aortic dissection management per vascular and ICU team   will follow

## 2023-05-15 NOTE — PROGRESS NOTE ADULT - SUBJECTIVE AND OBJECTIVE BOX
Gastroenterology progress note:     Patient is a 78y old  Male who presents with a chief complaint of upper GI bleed (15 May 2023 09:26)       Admitted on: 05-12-23    We are following the patient for hematemesis     No further episodes of vomiting. no  melena   PAST MEDICAL & SURGICAL HISTORY:  Hypertension      CAD (coronary artery disease)      S/P CABG x 1      Unspecified atrial fibrillation      Chronic systolic congestive heart failure      Asthma          MEDICATIONS  (STANDING):  atorvastatin 20 milliGRAM(s) Oral at bedtime  chlorhexidine 2% Cloths 1 Application(s) Topical daily  esmolol  Infusion 49.951 MICROgram(s)/kG/Min (30.3 mL/Hr) IV Continuous <Continuous>  ferrous    sulfate 325 milliGRAM(s) Oral daily  metoprolol tartrate 50 milliGRAM(s) Oral two times a day  montelukast 10 milliGRAM(s) Oral daily  pantoprazole  Injectable 40 milliGRAM(s) IV Push every 12 hours  sacubitril 24 mG/valsartan 26 mG 1 Tablet(s) Oral two times a day  tamsulosin 0.4 milliGRAM(s) Oral at bedtime    MEDICATIONS  (PRN):      Allergies  No Known Allergies      Review of Systems:   Cardiovascular:  No Chest Pain, No Palpitations  Respiratory:  No Cough, No Dyspnea  Gastrointestinal:  As described in HPI  Skin:  No Skin Lesions, No Jaundice  Neuro:  No Syncope, No Dizziness    Physical Examination:  T(C): 36.6 (05-15-23 @ 16:00), Max: 36.9 (05-15-23 @ 00:00)  HR: 63 (05-15-23 @ 16:13) (60 - 86)  BP: 130/73 (05-15-23 @ 08:45) (130/73 - 130/73)  RR: 25 (05-15-23 @ 16:13) (17 - 39)  SpO2: 95% (05-15-23 @ 16:13) (84% - 97%)      05-14-23 @ 07:01  -  05-15-23 @ 07:00  --------------------------------------------------------  IN: 2098.2 mL / OUT: 1200 mL / NET: 898.2 mL    05-15-23 @ 07:01  -  05-15-23 @ 16:22  --------------------------------------------------------  IN: 741.5 mL / OUT: 300 mL / NET: 441.5 mL        GENERAL: AAOx3, no acute distress.  HEAD:  Atraumatic, Normocephalic  EYES: conjunctiva and sclera clear  NECK: Supple, no JVD or thyromegaly  CHEST/LUNG: Clear to auscultation bilaterally; No wheeze, rhonchi, or rales  HEART: Regular rate and rhythm; normal S1, S2, No murmurs.  ABDOMEN: Soft, nontender, nondistended; Bowel sounds present  NEUROLOGY: No asterixis or tremor.   SKIN: Intact, no jaundice     Data:                        8.0    4.17  )-----------( 205      ( 15 May 2023 04:20 )             26.3     Hgb trend:  8.0  05-15-23 @ 04:20  7.9  05-14-23 @ 04:24  7.9  05-13-23 @ 11:32  7.9  05-13-23 @ 05:03  8.4  05-12-23 @ 18:00        05-15    137  |  106  |  16  ----------------------------<  88  3.8   |  21  |  1.0    Ca    7.6<L>      15 May 2023 04:20  Mg     2.0     05-15    TPro  5.4<L>  /  Alb  3.5  /  TBili  0.9  /  DBili  x   /  AST  14  /  ALT  10  /  AlkPhos  52  05-15    Liver panel trend:  TBili 0.9   /   AST 14   /   ALT 10   /   AlkP 52   /   Tptn 5.4   /   Alb 3.5    /   DBili --      05-15  TBili 0.8   /   AST 14   /   ALT 11   /   AlkP 49   /   Tptn 5.2   /   Alb 3.4    /   DBili -- 05-14  TBili 0.7   /   AST 12   /   ALT 9   /   AlkP 49   /   Tptn 5.3   /   Alb 3.4    /   DBili --      05-13  TBili 0.7   /   AST 11   /   ALT 8   /   AlkP 51   /   Tptn 5.4   /   Alb 3.4    /   DBili --      05-12  TBili 0.5   /   AST 11   /   ALT 9   /   AlkP 52   /   Tptn 5.5   /   Alb 3.5    /   DBili --      05-12             Radiology:       Gastroenterology progress note:     Patient is a 78y old  Male who presents with a chief complaint of upper GI bleed (15 May 2023 09:26)       Admitted on: 05-12-23    We are following the patient for hematemesis     No further episodes of vomiting. no melena   PAST MEDICAL & SURGICAL HISTORY:  Hypertension      CAD (coronary artery disease)      S/P CABG x 1      Unspecified atrial fibrillation      Chronic systolic congestive heart failure      Asthma          MEDICATIONS  (STANDING):  atorvastatin 20 milliGRAM(s) Oral at bedtime  chlorhexidine 2% Cloths 1 Application(s) Topical daily  esmolol  Infusion 49.951 MICROgram(s)/kG/Min (30.3 mL/Hr) IV Continuous <Continuous>  ferrous    sulfate 325 milliGRAM(s) Oral daily  metoprolol tartrate 50 milliGRAM(s) Oral two times a day  montelukast 10 milliGRAM(s) Oral daily  pantoprazole  Injectable 40 milliGRAM(s) IV Push every 12 hours  sacubitril 24 mG/valsartan 26 mG 1 Tablet(s) Oral two times a day  tamsulosin 0.4 milliGRAM(s) Oral at bedtime    MEDICATIONS  (PRN):      Allergies  No Known Allergies      Review of Systems:   Cardiovascular:  No Chest Pain, No Palpitations  Respiratory:  No Cough, No Dyspnea  Gastrointestinal:  As described in HPI  Skin:  No Skin Lesions, No Jaundice  Neuro:  No Syncope, No Dizziness    Physical Examination:  T(C): 36.6 (05-15-23 @ 16:00), Max: 36.9 (05-15-23 @ 00:00)  HR: 63 (05-15-23 @ 16:13) (60 - 86)  BP: 130/73 (05-15-23 @ 08:45) (130/73 - 130/73)  RR: 25 (05-15-23 @ 16:13) (17 - 39)  SpO2: 95% (05-15-23 @ 16:13) (84% - 97%)      05-14-23 @ 07:01  -  05-15-23 @ 07:00  --------------------------------------------------------  IN: 2098.2 mL / OUT: 1200 mL / NET: 898.2 mL    05-15-23 @ 07:01  -  05-15-23 @ 16:22  --------------------------------------------------------  IN: 741.5 mL / OUT: 300 mL / NET: 441.5 mL        GENERAL: AAOx3, no acute distress.  HEAD:  Atraumatic, Normocephalic  EYES: conjunctiva and sclera clear  NECK: Supple, no JVD or thyromegaly  CHEST/LUNG: Clear to auscultation bilaterally; No wheeze, rhonchi, or rales  HEART: Regular rate and rhythm; normal S1, S2, No murmurs.  ABDOMEN: Soft, nontender, nondistended; Bowel sounds present  NEUROLOGY: No asterixis or tremor.   SKIN: Intact, no jaundice     Data:                        8.0    4.17  )-----------( 205      ( 15 May 2023 04:20 )             26.3     Hgb trend:  8.0  05-15-23 @ 04:20  7.9  05-14-23 @ 04:24  7.9  05-13-23 @ 11:32  7.9  05-13-23 @ 05:03  8.4  05-12-23 @ 18:00        05-15    137  |  106  |  16  ----------------------------<  88  3.8   |  21  |  1.0    Ca    7.6<L>      15 May 2023 04:20  Mg     2.0     05-15    TPro  5.4<L>  /  Alb  3.5  /  TBili  0.9  /  DBili  x   /  AST  14  /  ALT  10  /  AlkPhos  52  05-15    Liver panel trend:  TBili 0.9   /   AST 14   /   ALT 10   /   AlkP 52   /   Tptn 5.4   /   Alb 3.5    /   DBili --      05-15  TBili 0.8   /   AST 14   /   ALT 11   /   AlkP 49   /   Tptn 5.2   /   Alb 3.4    /   DBili -- 05-14  TBili 0.7   /   AST 12   /   ALT 9   /   AlkP 49   /   Tptn 5.3   /   Alb 3.4    /   DBili --      05-13  TBili 0.7   /   AST 11   /   ALT 8   /   AlkP 51   /   Tptn 5.4   /   Alb 3.4    /   DBili --      05-12  TBili 0.5   /   AST 11   /   ALT 9   /   AlkP 52   /   Tptn 5.5   /   Alb 3.5    /   DBili --      05-12             Radiology:

## 2023-05-15 NOTE — PROGRESS NOTE ADULT - ASSESSMENT
77yo M hx of afib on eliquis, HFrEF (unknown EF), CAD s/p CABG (8 years ago), HTN, asthma presented to the hospital for syncopal episode followed by unwitnessed fall. At the ED, had 50cc hematemesis from suspected upper GIB. Admitted to ICU    #Suspected Upper GI Bleed  #Hematemesis   #Acute blood loss anemia - resolved s/p 2u RBC  - pt presented with hematemesis, epistaxis, and fall associated with hypotension  - Hgb 7.3 received 2u RBC, repeat Hgb stable  - no active bleeding since ED presentation  - GI following -- plan for EGD this week, requesting cardiac risk stratification  - IV PPI BID  - transfuse for Hgb < 7.0  - ferrous sulfate 325mg qd     #Unwitnessed fall  - trauma workup CTH, CT spine, CT A/P, XR pelvis negative   - 5/12 echo: EF 68%, mild-mod AR, mild MR, mild TR, mild pHTN  - fall precautions    #Acute type B aortic dissection (very short segment)  - CTA chest aorta w/wo IV contrast: very short segment acute type B aortic dissection 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 extending few cm from aortic arch, no evidence of acute traumatic injury, arterial extravasation  - trop negative   - Vascular surgery consulted: strict BP control, no acute surgery, hold all AC for GIB  - CT Surgery consulted, rec BP control, fluid resuscitation for hypovolemia; keep HR < 80  - Cardio consulted: start esmolol drip goal HR 50s, add nicardipine if remains hypertensive, hold AC  - keep SBP < 120, HR < 60    #H/o HFrEF (unknown EF) with now recovered EF  #CAD s/p CABG   - at home is on lasix 20mg qd entresto 24-26mg qd, eplerenone 25mg qd  - restart entresto  - hold lasix and eplerenone  - no fluids  - repeat 5/12 echo: EF 68%, mild-mod AR, mild MR, mild TR, mild pHTN  - statin    #Paroxysmal afib on eliquis  - not on any rate control at home  - start lopressor 50mg q12h, can increase  - esmolol drip to keep HR < 60 for aortic dissection  - hold AC for GIB  - duplex BL LEs ordered     #Hx of asthma  - not in exacerbation  - SpO2 98% on RA, no wheezing   - c/w home montelukast 10mg qd     Misc  - DVT ppx: held for GIB  - GI ppx: IV PPI BID  - Diet:  lpw fiber soft bite sized   - Activity: IAT  - Code status: FULL  - Dispo: MICU

## 2023-05-15 NOTE — PATIENT PROFILE ADULT - NSPROGENSOURCEINFO_GEN_A_NUR
----- Message from LAMINE Hood sent at 8/30/2017  2:18 PM CDT -----  Please let the patient know her mamm was normal, continue with routine screening.  
Left VM that mammogram was normal and pt will be receiving a letter stating this in the mail.  
family

## 2023-05-15 NOTE — PATIENT PROFILE ADULT - FALL HARM RISK - FACTORS
Patient wants all medications refilled EXAM NOTE     Chief Complaint   Patient presents with   • Follow-up       HISTORY   La Ricci is a 76 year old female who presents as above.    Patient wants all medications refilled patient is very noncompliant patient also wants disable license plate paperwork patient already got a flu shot patient has psoriasis  on the scalp  Sees Dr. Field for pancytopenia patient has history of breast cancer other under    Past Medical History:   Diagnosis Date   • Anxiety    • Cataract    • Cirrhosis (CMS/HCC) July 2014   • Depression    • Diabetes mellitus (CMS/HCC)    • Diverticulosis of colon (without mention of hemorrhage)    • Esophageal varices (CMS/HCC)    • Fatty liver    • Hyperlipemia    • Malignant neoplasm (CMS/HCC) 07/25/2012    right breast   • Vitamin D deficiency        Patient Active Problem List   Diagnosis   • Malignant neoplasm of breast (female), unspecified site   • Anxiety   • Diabetes mellitus (CMS/HCC)   • Hyperlipemia   • Screening for malignant neoplasm of the cervix   • Cirrhosis (CMS/HCC)   • Iron deficiency anemia due to chronic blood loss        I have reviewed the past medical, family and social history sections including the medications and allergies listed in the above medical record as well as the nursing notes.     REVIEW OF SYSTEMS     Constitutional:  Patient denies fever, chills, tiredness or malaise.   Eyes:  Denies change in visual acuity, pain, burning or itching.   Immunologic:  Denies hives, seasonal allergies.   HENT:  Denies sinus problems, ear infections, nasal congestion or sore throat.   Respiratory:  Denies cough, shortness of breath.   Cardiovascular:  Denies chest pain, edema.   GI:  Denies abdominal pain, nausea, vomiting, bloody stools or diarrhea.   :  Denies urine retention, painful urination, urinary frequency, blood in urine or nocturia.   Musculoskeletal:  Denies back pain, neck pain, joint pain or leg swelling.    Integument:  Denies rash, itching.   Neurologic:  Denies headache, focal weakness or sensory changes.   Endocrine:  Denies polyuria, polydipsia or temperature intolerance.   Lymphatic:  Denies swollen glands, weight loss.   All other systems reviewed and negative.    Lab Services on 10/10/2019   Component Date Value   • FASTING STATUS 10/10/2019 4.5    • CHOLESTEROL 10/10/2019 231*   • CALCULATED LDL 10/10/2019 139*   • HDL 10/10/2019 51    • TRIGLYCERIDE 10/10/2019 205*   • CALCULATED NON HDL 10/10/2019 180    • CHOL/HDL 10/10/2019 4.5*   • Hemoglobin A1C 10/10/2019 10.5*   • Fasting Status 10/10/2019 4.0    • Sodium 10/10/2019 136    • Potassium 10/10/2019 3.9    • Chloride 10/10/2019 103    • Carbon Dioxide 10/10/2019 24    • Anion Gap 10/10/2019 13    • Glucose 10/10/2019 376*   • BUN 10/10/2019 9    • Creatinine 10/10/2019 0.50*   • GFR Estimate,  Am* 10/10/2019 >90    • GFR Estimate, Non Latisha* 10/10/2019 >90    • BUN/Creatinine Ratio 10/10/2019 18    • CALCIUM 10/10/2019 8.5    • TOTAL BILIRUBIN 10/10/2019 1.5*   • AST/SGOT 10/10/2019 36    • ALT/SGPT 10/10/2019 29    • ALK PHOSPHATASE 10/10/2019 113    • TOTAL PROTEIN 10/10/2019 7.5    • Albumin 10/10/2019 3.0*   • GLOBULIN 10/10/2019 4.5*   • A/G Ratio, Serum 10/10/2019 0.7*   • MICROALBUMIN, UA (TTL) 10/10/2019 3.24    • CREATININE, URINE (TOTAL) 10/10/2019 40.70    • MICROALBUMIN/CREATININE 10/10/2019 79.6*        PHYSICAL EXAM   Vital Signs:    Visit Vitals  /62 (BP Location: RUE - Right upper extremity, Patient Position: Sitting, Cuff Size: Regular)   Pulse 81   Resp 20   Ht 5' 2.5\" (1.588 m)   Wt 55.3 kg   LMP  (LMP Unknown)   BMI 21.94 kg/m²     Weight    10/10/19 1434   Weight: 55.3 kg     BP Readings from Last 4 Encounters:   10/10/19 128/62   09/16/19 141/64   09/09/19 155/70   08/29/19 141/64     Wt Readings from Last 3 Encounters:   10/10/19 55.3 kg   08/29/19 56.3 kg   07/17/19 55.7 kg         Integument:  Warm.  Dry.  No  erythema.  No rash.   HENT:  Normocephalic.  Atraumatic.  Bilateral external ears normal.  Oropharynx moist. No oral exudates.  Nose normal.   Neck:  Normal range of motion.  No tenderness.  Supple.  No stridor.   Eyes:  PERRL, EOMI.  Conjunctivae normal.  No discharge.   Cardiovascular:  Normal heart rate.  Normal rhythm.  No murmurs.  No rubs.  No gallops.   Respiratory:  Normal breath sounds.  No respiratory distress.  No wheezing.  No chest tenderness.   GI:  Bowel sounds normal.  Soft.  No tenderness.  No masses.  No pulsatile masses.   Neurologic:  Alert & oriented x 3.  Normal motor function.  Normal sensory function.  No focal deficits noted.     ASSESSMENT:    1. Hypertension refilled lisinopril and Inderal  2. Diabetes type 2 glimepiride will check A1c and urine microalbumin the patient is also on metformin  Pancytopenia chronic likely from her portal hypertension and hypersplenism sees Dr. Field he also advised her to follow up with GI patient wants to hold off  DC smoking  Nonalcoholic steatohepatitis and cirrhosis of the liver   Hyperlipidemia will check fasting lipid panel  Right breast invasive lobular cancer of the breast  Elevated TSH patient did not follow through will repeat TSH and monitor  History of depression and anxiety on medications  Psoriasis  of the scalp will refer to see Dermatology    Spent 25 minutes with this patient more than 50% counseling regarding her concerns.    PLAN:  La was seen today for follow-up.    Diagnoses and all orders for this visit:    Type 2 diabetes mellitus with hyperglycemia, without long-term current use of insulin (CMS/[facility])  -     GLYCOHEMOGLOBIN; Future  -     MICROALBUMIN URINE RANDOM; Future  -     COMPREHENSIVE METABOLIC PANEL; Future    Pure hypercholesterolemia  -     LIPID PANEL WITH REFLEX; Future  -     COMPREHENSIVE METABOLIC PANEL; Future         Weakness/Other

## 2023-05-15 NOTE — PROGRESS NOTE ADULT - ASSESSMENT
IMPRESSION:    Syncope, 1 episode on presentation, no repeat episodes  Type B aortic dissection  Possible UGIB, Hgb stable  HO CHF   HO CAD s/p CABG  HO Afib, holding eliquis    PLAN:    CNS: Avoid CNS depressants.     HEENT: Oral care    PULMONARY:  HOB @ 45 degrees. Aspiration precautions. target SpO2 92-96%. CT Surgery FU.    CARDIOVASCULAR: Avoid volume overload. MAP>60. Dc LR to 50 cc/hr. metoprolol, entresto, monitor HR/ bp    GI: GI prophylaxis. Advance diet from clears to GI soft, low fat, low residue. GI following.     RENAL: Follow up lytes. Correct as needed.     INFECTIOUS DISEASE: Follow up cultures, procalcitonin. No abx for now.     HEMATOLOGICAL:  DVT prophylaxis. LE duplex. trend CBC    ENDOCRINE:  Follow up FS.     MUSCULOSKELETAL: OOB to chair.     MICU

## 2023-05-15 NOTE — PROGRESS NOTE ADULT - SUBJECTIVE AND OBJECTIVE BOX
Over Night Events: events noter on esmolol drip, LR 50 CC/H, no bloody BM    PHYSICAL EXAM    ICU Vital Signs Last 24 Hrs  T(C): 36.6 (15 May 2023 08:00), Max: 37.2 (14 May 2023 16:00)  T(F): 97.9 (15 May 2023 08:00), Max: 98.9 (14 May 2023 16:00)  HR: 70 (15 May 2023 08:30) (60 - 80)  ABP: 152/78 (15 May 2023 08:30) (103/88 - 153/84)  ABP(mean): 106 (15 May 2023 08:30) (69 - 114)  RR: 29 (15 May 2023 08:30) (17 - 37)  SpO2: 94% (15 May 2023 08:30) (84% - 98%)    O2 Parameters below as of 15 May 2023 08:00  Patient On (Oxygen Delivery Method): room air            General: ill looking  Lungs: DEC BS Both bases  Cardiovascular: Regular   Abdomen: Soft, Positive BS  Extremities: No clubbing   Skin: Warm  Neurological: Non focal       05-14-23 @ 07:01  -  05-15-23 @ 07:00  --------------------------------------------------------  IN:    Esmolol: 848.2 mL    Lactated Ringers: 1250 mL  Total IN: 2098.2 mL    OUT:    Voided (mL): 1200 mL  Total OUT: 1200 mL    Total NET: 898.2 mL      05-15-23 @ 07:01  -  05-15-23 @ 08:59  --------------------------------------------------------  IN:    Esmolol: 45.5 mL    Lactated Ringers: 100 mL  Total IN: 145.5 mL    OUT:    Voided (mL): 100 mL  Total OUT: 100 mL    Total NET: 45.5 mL          LABS:                          8.0    4.17  )-----------( 205      ( 15 May 2023 04:20 )             26.3                                               05-15    137  |  106  |  16  ----------------------------<  88  3.8   |  21  |  1.0    Ca    7.6<L>      15 May 2023 04:20  Mg     2.0     05-15    TPro  5.4<L>  /  Alb  3.5  /  TBili  0.9  /  DBili  x   /  AST  14  /  ALT  10  /  AlkPhos  52  05-15                                                                                           LIVER FUNCTIONS - ( 15 May 2023 04:20 )  Alb: 3.5 g/dL / Pro: 5.4 g/dL / ALK PHOS: 52 U/L / ALT: 10 U/L / AST: 14 U/L / GGT: x                                                                                                                                       MEDICATIONS  (STANDING):  atorvastatin 20 milliGRAM(s) Oral at bedtime  chlorhexidine 2% Cloths 1 Application(s) Topical daily  esmolol  Infusion 49.951 MICROgram(s)/kG/Min (30.3 mL/Hr) IV Continuous <Continuous>  ferrous    sulfate 325 milliGRAM(s) Oral daily  lactated ringers. 1000 milliLiter(s) (50 mL/Hr) IV Continuous <Continuous>  montelukast 10 milliGRAM(s) Oral daily  pantoprazole  Injectable 40 milliGRAM(s) IV Push every 12 hours  tamsulosin 0.4 milliGRAM(s) Oral at bedtime        cxr noted

## 2023-05-15 NOTE — PATIENT PROFILE ADULT - PACKS PER DAY
Detail Level: Detailed
Quality 226: Preventive Care And Screening: Tobacco Use: Screening And Cessation Intervention: Patient screened for tobacco use and is an ex/non-smoker
Quality 111:Pneumonia Vaccination Status For Older Adults: Pneumococcal vaccine (PPSV23) was not administered on or after patient’s 60th birthday and before the end of the measurement period, reason not otherwise specified
Quality 110: Preventive Care And Screening: Influenza Immunization: Influenza Immunization not Administered because Patient Refused.
Quality 431: Preventive Care And Screening: Unhealthy Alcohol Use - Screening: Patient not identified as an unhealthy alcohol user when screened for unhealthy alcohol use using a systematic screening method
0.5

## 2023-05-15 NOTE — PROGRESS NOTE ADULT - SUBJECTIVE AND OBJECTIVE BOX
Internal Medicine Progress Note    Location: [address]  Patient Name: ZAID DU  MRN: [id number]    Patient is a 78y old  Male who presents with a chief complaint of upper GI bleed (15 May 2023 08:59)      Subjective  NAEON. This morning, patient was seen and examined at bedside. Patient reports doing well, denies any new symptoms/complaints      Objective  Vital Signs Last 24 Hrs  T(C): 36.6 (15 May 2023 08:00), Max: 37.2 (14 May 2023 16:00)  T(F): 97.9 (15 May 2023 08:00), Max: 98.9 (14 May 2023 16:00)  HR: 70 (15 May 2023 08:30) (60 - 80)  BP: --  BP(mean): --  RR: 29 (15 May 2023 08:30) (17 - 37)  SpO2: 94% (15 May 2023 08:30) (84% - 98%)    Parameters below as of 15 May 2023 08:00  Patient On (Oxygen Delivery Method): room air        Intake/output   05-14-23 @ 07:01  -  05-15-23 @ 07:00  --------------------------------------------------------  IN: 2098.2 mL / OUT: 1200 mL / NET: 898.2 mL    05-15-23 @ 07:01  -  05-15-23 @ 09:27  --------------------------------------------------------  IN: 145.5 mL / OUT: 100 mL / NET: 45.5 mL        Physical Exam  General: nontoxic, NAD  Neck: supple  Chest/lung: nonlabored respirations on RA, CTAB  Heart: RRR, +S1 S2  Abdomen: soft, nontender, nondistended  Extremities: no clubbing/cyanosis   Neuro: no gross focal deficits  Psych: awake alert     Labs  CBC:                       8.0    4.17  )-----------( 205      ( 15 May 2023 04:20 )             26.3     BMP: 05-15    137  |  106  |  16  ----------------------------<  88  3.8   |  21  |  1.0    Ca    7.6<L>      15 May 2023 04:20  Mg     2.0     05-15    TPro  5.4<L>  /  Alb  3.5  /  TBili  0.9  /  DBili  x   /  AST  14  /  ALT  10  /  AlkPhos  52  05-15    Coag:   ABG:   Cardiac markers:       Micro:        Imaging:    Standing Medications  MEDICATIONS  (STANDING):  atorvastatin 20 milliGRAM(s) Oral at bedtime  chlorhexidine 2% Cloths 1 Application(s) Topical daily  esmolol  Infusion 49.951 MICROgram(s)/kG/Min (30.3 mL/Hr) IV Continuous <Continuous>  ferrous    sulfate 325 milliGRAM(s) Oral daily  metoprolol tartrate 50 milliGRAM(s) Oral two times a day  montelukast 10 milliGRAM(s) Oral daily  pantoprazole  Injectable 40 milliGRAM(s) IV Push every 12 hours  sacubitril 24 mG/valsartan 26 mG 1 Tablet(s) Oral two times a day  tamsulosin 0.4 milliGRAM(s) Oral at bedtime    PRN Medications  MEDICATIONS  (PRN):

## 2023-05-16 LAB
ALBUMIN SERPL ELPH-MCNC: 3.5 G/DL — SIGNIFICANT CHANGE UP (ref 3.5–5.2)
ALP SERPL-CCNC: 56 U/L — SIGNIFICANT CHANGE UP (ref 30–115)
ALT FLD-CCNC: 9 U/L — SIGNIFICANT CHANGE UP (ref 0–41)
ANION GAP SERPL CALC-SCNC: 13 MMOL/L — SIGNIFICANT CHANGE UP (ref 7–14)
AST SERPL-CCNC: 15 U/L — SIGNIFICANT CHANGE UP (ref 0–41)
BASOPHILS # BLD AUTO: 0.02 K/UL — SIGNIFICANT CHANGE UP (ref 0–0.2)
BASOPHILS NFR BLD AUTO: 0.3 % — SIGNIFICANT CHANGE UP (ref 0–1)
BILIRUB SERPL-MCNC: 0.8 MG/DL — SIGNIFICANT CHANGE UP (ref 0.2–1.2)
BLD GP AB SCN SERPL QL: SIGNIFICANT CHANGE UP
BUN SERPL-MCNC: 15 MG/DL — SIGNIFICANT CHANGE UP (ref 10–20)
CALCIUM SERPL-MCNC: 8 MG/DL — LOW (ref 8.4–10.5)
CHLORIDE SERPL-SCNC: 106 MMOL/L — SIGNIFICANT CHANGE UP (ref 98–110)
CO2 SERPL-SCNC: 18 MMOL/L — SIGNIFICANT CHANGE UP (ref 17–32)
CREAT SERPL-MCNC: 0.9 MG/DL — SIGNIFICANT CHANGE UP (ref 0.7–1.5)
EGFR: 89 ML/MIN/1.73M2 — SIGNIFICANT CHANGE UP
EOSINOPHIL # BLD AUTO: 0.06 K/UL — SIGNIFICANT CHANGE UP (ref 0–0.7)
EOSINOPHIL NFR BLD AUTO: 1 % — SIGNIFICANT CHANGE UP (ref 0–8)
GLUCOSE SERPL-MCNC: 76 MG/DL — SIGNIFICANT CHANGE UP (ref 70–99)
HCT VFR BLD CALC: 27 % — LOW (ref 42–52)
HCT VFR BLD CALC: 28.5 % — LOW (ref 42–52)
HGB BLD-MCNC: 8.1 G/DL — LOW (ref 14–18)
HGB BLD-MCNC: 8.7 G/DL — LOW (ref 14–18)
IMM GRANULOCYTES NFR BLD AUTO: 0.3 % — SIGNIFICANT CHANGE UP (ref 0.1–0.3)
LYMPHOCYTES # BLD AUTO: 0.65 K/UL — LOW (ref 1.2–3.4)
LYMPHOCYTES # BLD AUTO: 11.3 % — LOW (ref 20.5–51.1)
MAGNESIUM SERPL-MCNC: 2 MG/DL — SIGNIFICANT CHANGE UP (ref 1.8–2.4)
MCHC RBC-ENTMCNC: 22.1 PG — LOW (ref 27–31)
MCHC RBC-ENTMCNC: 22.1 PG — LOW (ref 27–31)
MCHC RBC-ENTMCNC: 30 G/DL — LOW (ref 32–37)
MCHC RBC-ENTMCNC: 30.5 G/DL — LOW (ref 32–37)
MCV RBC AUTO: 72.5 FL — LOW (ref 80–94)
MCV RBC AUTO: 73.6 FL — LOW (ref 80–94)
MONOCYTES # BLD AUTO: 0.55 K/UL — SIGNIFICANT CHANGE UP (ref 0.1–0.6)
MONOCYTES NFR BLD AUTO: 9.6 % — HIGH (ref 1.7–9.3)
NEUTROPHILS # BLD AUTO: 4.45 K/UL — SIGNIFICANT CHANGE UP (ref 1.4–6.5)
NEUTROPHILS NFR BLD AUTO: 77.5 % — HIGH (ref 42.2–75.2)
NRBC # BLD: 0 /100 WBCS — SIGNIFICANT CHANGE UP (ref 0–0)
NRBC # BLD: 0 /100 WBCS — SIGNIFICANT CHANGE UP (ref 0–0)
PLATELET # BLD AUTO: 216 K/UL — SIGNIFICANT CHANGE UP (ref 130–400)
PLATELET # BLD AUTO: 227 K/UL — SIGNIFICANT CHANGE UP (ref 130–400)
PMV BLD: 9.3 FL — SIGNIFICANT CHANGE UP (ref 7.4–10.4)
PMV BLD: 9.5 FL — SIGNIFICANT CHANGE UP (ref 7.4–10.4)
POTASSIUM SERPL-MCNC: 4 MMOL/L — SIGNIFICANT CHANGE UP (ref 3.5–5)
POTASSIUM SERPL-SCNC: 4 MMOL/L — SIGNIFICANT CHANGE UP (ref 3.5–5)
PROT SERPL-MCNC: 5.5 G/DL — LOW (ref 6–8)
RBC # BLD: 3.67 M/UL — LOW (ref 4.7–6.1)
RBC # BLD: 3.93 M/UL — LOW (ref 4.7–6.1)
RBC # FLD: 23.6 % — HIGH (ref 11.5–14.5)
RBC # FLD: 23.6 % — HIGH (ref 11.5–14.5)
SODIUM SERPL-SCNC: 137 MMOL/L — SIGNIFICANT CHANGE UP (ref 135–146)
WBC # BLD: 5.75 K/UL — SIGNIFICANT CHANGE UP (ref 4.8–10.8)
WBC # BLD: 7.78 K/UL — SIGNIFICANT CHANGE UP (ref 4.8–10.8)
WBC # FLD AUTO: 5.75 K/UL — SIGNIFICANT CHANGE UP (ref 4.8–10.8)
WBC # FLD AUTO: 7.78 K/UL — SIGNIFICANT CHANGE UP (ref 4.8–10.8)

## 2023-05-16 PROCEDURE — 99232 SBSQ HOSP IP/OBS MODERATE 35: CPT

## 2023-05-16 PROCEDURE — 71045 X-RAY EXAM CHEST 1 VIEW: CPT | Mod: 26

## 2023-05-16 RX ORDER — IPRATROPIUM/ALBUTEROL SULFATE 18-103MCG
3 AEROSOL WITH ADAPTER (GRAM) INHALATION EVERY 6 HOURS
Refills: 0 | Status: DISCONTINUED | OUTPATIENT
Start: 2023-05-16 | End: 2023-05-20

## 2023-05-16 RX ORDER — FUROSEMIDE 40 MG
40 TABLET ORAL DAILY
Refills: 0 | Status: DISCONTINUED | OUTPATIENT
Start: 2023-05-16 | End: 2023-05-18

## 2023-05-16 RX ADMIN — MONTELUKAST 10 MILLIGRAM(S): 4 TABLET, CHEWABLE ORAL at 13:12

## 2023-05-16 RX ADMIN — Medication 3 MILLILITER(S): at 22:13

## 2023-05-16 RX ADMIN — PANTOPRAZOLE SODIUM 40 MILLIGRAM(S): 20 TABLET, DELAYED RELEASE ORAL at 05:16

## 2023-05-16 RX ADMIN — ATORVASTATIN CALCIUM 20 MILLIGRAM(S): 80 TABLET, FILM COATED ORAL at 21:02

## 2023-05-16 RX ADMIN — Medication 325 MILLIGRAM(S): at 13:13

## 2023-05-16 RX ADMIN — Medication 3 MILLILITER(S): at 02:45

## 2023-05-16 RX ADMIN — Medication 50 MILLIGRAM(S): at 05:13

## 2023-05-16 RX ADMIN — TAMSULOSIN HYDROCHLORIDE 0.4 MILLIGRAM(S): 0.4 CAPSULE ORAL at 21:02

## 2023-05-16 RX ADMIN — Medication 40 MILLIGRAM(S): at 09:23

## 2023-05-16 RX ADMIN — Medication 50 MILLIGRAM(S): at 13:13

## 2023-05-16 RX ADMIN — CHLORHEXIDINE GLUCONATE 1 APPLICATION(S): 213 SOLUTION TOPICAL at 13:16

## 2023-05-16 RX ADMIN — SACUBITRIL AND VALSARTAN 1 TABLET(S): 24; 26 TABLET, FILM COATED ORAL at 05:19

## 2023-05-16 RX ADMIN — SACUBITRIL AND VALSARTAN 1 TABLET(S): 24; 26 TABLET, FILM COATED ORAL at 18:00

## 2023-05-16 RX ADMIN — PANTOPRAZOLE SODIUM 40 MILLIGRAM(S): 20 TABLET, DELAYED RELEASE ORAL at 17:59

## 2023-05-16 RX ADMIN — Medication 30.3 MICROGRAM(S)/KG/MIN: at 05:44

## 2023-05-16 RX ADMIN — Medication 50 MILLIGRAM(S): at 21:01

## 2023-05-16 NOTE — DIETITIAN INITIAL EVALUATION ADULT - NSFNSGIIOFT_GEN_A_CORE
I&O's Detail    15 May 2023 07:01  -  16 May 2023 07:00  --------------------------------------------------------  IN:    Esmolol: 1472.3 mL    Lactated Ringers: 150 mL    Oral Fluid: 300 mL  Total IN: 1922.3 mL    OUT:    Voided (mL): 2050 mL  Total OUT: 2050 mL    Total NET: -127.7 mL

## 2023-05-16 NOTE — DIETITIAN INITIAL EVALUATION ADULT - NUTRITIONGOAL OUTCOME1
Pt to meet >75% estimated needs in 5-7 days. RD to follow as per moderate risk protocol.  Monitor diet order, kcal intake, body composition, NFPE, labs  (lytes, BG, renal indices)

## 2023-05-16 NOTE — DIETITIAN INITIAL EVALUATION ADULT - OTHER INFO
Pt presented after a syncopal episode followed by unwitnessed fall, found to have type B aortic dissection and possible UGIB, now with stable hgb. GI soft diet per MICU attending.

## 2023-05-16 NOTE — PROGRESS NOTE ADULT - ASSESSMENT
IMPRESSION:    Syncope, 1 episode on presentation, no repeat episodes  Type B aortic dissection  Possible UGIB, Hgb stable sp 2 units PRBC  HO CHF   HO CAD s/p CABG  HO Afib, holding eliquis    PLAN:    CNS: Avoid CNS depressants.     HEENT: Oral care    PULMONARY:  HOB @ 45 degrees. Aspiration precautions. target SpO2 92-96%. CT Surgery FU.    CARDIOVASCULAR: Avoid volume overload. MAP>60. Dmetoprolol, entresto, LASIX, monitor HR/ bp    GI: GI prophylaxis. Advance diet from clears to GI soft, low fat, low residue. GI following.     RENAL: Follow up lytes. Correct as needed.     INFECTIOUS DISEASE: Follow up cultures, procalcitonin. No abx for now.     HEMATOLOGICAL:  DVT prophylaxis. LE duplex -. trend CBC    ENDOCRINE:  Follow up FS.     MUSCULOSKELETAL: OOB to chair.     MICU

## 2023-05-16 NOTE — DIETITIAN INITIAL EVALUATION ADULT - PERTINENT LABORATORY DATA
05-16    137  |  106  |  15  ----------------------------<  76  4.0   |  18  |  0.9    Ca    8.0<L>      16 May 2023 05:15  Mg     2.0     05-16    TPro  5.5<L>  /  Alb  3.5  /  TBili  0.8  /  DBili  x   /  AST  15  /  ALT  9   /  AlkPhos  56  05-16

## 2023-05-16 NOTE — PROGRESS NOTE ADULT - SUBJECTIVE AND OBJECTIVE BOX
Over Night Events: events noted, on esmolol 50, ? melena overnight    PHYSICAL EXAM    ICU Vital Signs Last 24 Hrs  T(C): 36.6 (16 May 2023 08:00), Max: 36.7 (15 May 2023 20:00)  T(F): 97.9 (16 May 2023 08:00), Max: 98 (15 May 2023 20:00)  HR: 64 (16 May 2023 08:00) (57 - 86)  BP: 134/62 (16 May 2023 08:00) (101/55 - 157/73)  BP(mean): 89 (16 May 2023 08:00) (74 - 108)  ABP: 141/57 (16 May 2023 08:00) (91/75 - 153/88)  ABP(mean): 84 (16 May 2023 08:00) (75 - 144)  RR: 26 (16 May 2023 08:00) (21 - 39)  SpO2: 99% (16 May 2023 08:00) (89% - 100%)    O2 Parameters below as of 16 May 2023 08:00  Patient On (Oxygen Delivery Method): room air            General: ill looking  Lungs: Bilateral BS  Cardiovascular: Regular   Abdomen: Soft, Positive BS  Extremities: No clubbing   Skin: Warm  Neurological: Non focal       05-15-23 @ 07:01  -  05-16-23 @ 07:00  --------------------------------------------------------  IN:    Esmolol: 1472.3 mL    Lactated Ringers: 150 mL    Oral Fluid: 300 mL  Total IN: 1922.3 mL    OUT:    Voided (mL): 2050 mL  Total OUT: 2050 mL    Total NET: -127.7 mL      05-16-23 @ 07:01  -  05-16-23 @ 08:15  --------------------------------------------------------  IN:    Esmolol: 30.3 mL  Total IN: 30.3 mL    OUT:    Voided (mL): 100 mL  Total OUT: 100 mL    Total NET: -69.7 mL          LABS:                          8.1    5.75  )-----------( 216      ( 16 May 2023 05:15 )             27.0                                               05-16    137  |  106  |  15  ----------------------------<  76  4.0   |  18  |  0.9    Ca    8.0<L>      16 May 2023 05:15  Mg     2.0     05-16    TPro  5.5<L>  /  Alb  3.5  /  TBili  0.8  /  DBili  x   /  AST  15  /  ALT  9   /  AlkPhos  56  05-16                                                                                           LIVER FUNCTIONS - ( 16 May 2023 05:15 )  Alb: 3.5 g/dL / Pro: 5.5 g/dL / ALK PHOS: 56 U/L / ALT: 9 U/L / AST: 15 U/L / GGT: x                                                                                                                                       MEDICATIONS  (STANDING):  atorvastatin 20 milliGRAM(s) Oral at bedtime  chlorhexidine 2% Cloths 1 Application(s) Topical daily  esmolol  Infusion 49.951 MICROgram(s)/kG/Min (30.3 mL/Hr) IV Continuous <Continuous>  ferrous    sulfate 325 milliGRAM(s) Oral daily  metoprolol tartrate 50 milliGRAM(s) Oral every 8 hours  montelukast 10 milliGRAM(s) Oral daily  pantoprazole  Injectable 40 milliGRAM(s) IV Push every 12 hours  sacubitril 24 mG/valsartan 26 mG 1 Tablet(s) Oral two times a day  tamsulosin 0.4 milliGRAM(s) Oral at bedtime    MEDICATIONS  (PRN):  albuterol/ipratropium for Nebulization 3 milliLiter(s) Nebulizer every 6 hours PRN Shortness of Breath and/or Wheezing    CXR REVIEWED

## 2023-05-16 NOTE — DIETITIAN INITIAL EVALUATION ADULT - PERSON TAUGHT/METHOD
Reviewed low salt diet with 1 liter fluid restriction and avoid processed food; can bring food from home compliant with these restrictions; encouraged PO intake/verbal instruction/patient instructed/spouse instructed/daughter instructed/mother instructed

## 2023-05-16 NOTE — PROGRESS NOTE ADULT - ASSESSMENT
77yo M hx of afib on eliquis, HFrEF (unknown EF), CAD s/p CABG (8 years ago), HTN, asthma presented to the hospital for syncopal episode followed by unwitnessed fall. At the ED, had 50cc hematemesis from suspected upper GIB. Admitted to ICU    #Suspected Upper GI Bleed  #Hematemesis   #Acute blood loss anemia - resolved s/p 2u RBC  - pt presented with hematemesis, epistaxis, and fall associated with hypotension  - Hgb 7.3 received 2u RBC, repeat Hgb stable  - no active bleeding since ED presentation  - GI following -- no EGD until more stable given no active bleed   - IV PPI BID  - CBC q12h, transfuse for Hgb < 7.0  - ferrous sulfate 325mg qd     #Unwitnessed fall  - trauma workup CTH, CT spine, CT A/P, XR pelvis negative   - 5/12 echo: EF 68%, mild-mod AR, mild MR, mild TR, mild pHTN  - fall precautions    #Acute type B aortic dissection (very short segment)  - CTA chest aorta w/wo IV contrast: very short segment acute type B aortic dissection 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 extending few cm from aortic arch, no evidence of acute traumatic injury, arterial extravasation  - trop negative   - Vascular surgery consulted: strict BP control, no acute surgery, hold all AC for GIB  - CT Surgery consulted, rec BP control, fluid resuscitation for hypovolemia; keep HR < 80  - Cardio consulted: start esmolol drip goal HR 50s, add nicardipine if remains hypertensive, hold AC  - keep SBP < 120, HR < 60  - wean esmolol drip  - started lopressor 25mg q8h     #H/o HFrEF (unknown EF) with now recovered EF  #CAD s/p CABG   - at home is on lasix 20mg qd entresto 24-26mg qd, eplerenone 25mg qd  - restart entresto  - increase lasix to 40mg qd   - no fluids  - repeat 5/12 echo: EF 68%, mild-mod AR, mild MR, mild TR, mild pHTN  - statin    #Paroxysmal afib on eliquis  - not on any rate control at home  - start lopressor 50mg q8h  - esmolol drip to keep HR < 60 for aortic dissection -- wean  - hold AC for GIB  - duplex negative for DVT    #Hx of asthma  - not in exacerbation  - SpO2 98% on RA, no wheezing   - c/w home montelukast 10mg qd     Misc  - DVT ppx: held for GIB  - GI ppx: IV PPI BID  - Diet:  low fiber soft bite sized   - Activity: IAT  - Code status: FULL  - Dispo: if repeat Hgb stable and off esmolol drip, downgrade to SDU

## 2023-05-16 NOTE — DIETITIAN INITIAL EVALUATION ADULT - ORAL INTAKE PTA/DIET HISTORY
Hx obtained from pt and pt's wife at bedside with pt's daughter on the phone. Pt had good PO intake PTA. No food allergy/intolerance. Follows low salt diet at home. No supplement use. No chewing/swallowing issues.    Hx obtained from pt and pt's wife at bedside with pt's daughter on the phone. Pt had good PO intake PTA. No food allergy/intolerance. Follows low salt diet at home. No supplement use. No chewing/swallowing issues. -220lbs. No recent significant weight change. Height ~5'9".    Hx obtained from pt and pt's wife at bedside with pt's daughter on the phone. RD is a native speaker of pt and pt's family's preferred language; interview was conducted in Cantonese. Pt had good PO intake PTA. No food allergy/intolerance. Follows low salt diet at home. No supplement use. No chewing/swallowing issues. -220lbs. No recent significant weight change. Height ~5'9".

## 2023-05-16 NOTE — DIETITIAN INITIAL EVALUATION ADULT - NS FNS DIET ORDER
Diet, Soft and Bite Sized:   Fiber/Residue Restricted  DASH/TLC {Sodium & Cholesterol Restricted} (05-14-23 @ 08:42) [Active]

## 2023-05-16 NOTE — PROGRESS NOTE ADULT - SUBJECTIVE AND OBJECTIVE BOX
Internal Medicine Progress Note    Location: [address]   Patient Name: ZAID DU  MRN: [id number]    Patient is a 78y old  Male who presents with a chief complaint of upper GI bleed (16 May 2023 08:14)      Subjective  Had one dark BM. On esmolol drip at 50    Objective  Vital Signs Last 24 Hrs  T(C): 36.6 (16 May 2023 08:00), Max: 36.7 (15 May 2023 20:00)  T(F): 97.9 (16 May 2023 08:00), Max: 98 (15 May 2023 20:00)  HR: 64 (16 May 2023 08:00) (57 - 86)  BP: 134/62 (16 May 2023 08:00) (101/55 - 157/73)  BP(mean): 89 (16 May 2023 08:00) (74 - 108)  RR: 26 (16 May 2023 08:00) (21 - 39)  SpO2: 99% (16 May 2023 08:00) (89% - 100%)    Parameters below as of 16 May 2023 08:00  Patient On (Oxygen Delivery Method): room air        Intake/output   05-15-23 @ 07:01  -  05-16-23 @ 07:00  --------------------------------------------------------  IN: 1922.3 mL / OUT: 2050 mL / NET: -127.7 mL    05-16-23 @ 07:01  -  05-16-23 @ 09:00  --------------------------------------------------------  IN: 30.3 mL / OUT: 100 mL / NET: -69.7 mL        Physical Exam  General: nontoxic, NAD  Neck: supple  Chest/lung: nonlabored respirations on RA, CTAB  Heart: RRR, +S1 S2  Abdomen: soft, nontender, nondistended  Extremities: no clubbing/cyanosis   Neuro: no gross focal deficits  Psych: awake alert     Labs  CBC:                       8.1    5.75  )-----------( 216      ( 16 May 2023 05:15 )             27.0     BMP: 05-16    137  |  106  |  15  ----------------------------<  76  4.0   |  18  |  0.9    Ca    8.0<L>      16 May 2023 05:15  Mg     2.0     05-16    TPro  5.5<L>  /  Alb  3.5  /  TBili  0.8  /  DBili  x   /  AST  15  /  ALT  9   /  AlkPhos  56  05-16    Coag:   ABG:   Cardiac markers:       Micro:        Imaging:    Standing Medications  MEDICATIONS  (STANDING):  atorvastatin 20 milliGRAM(s) Oral at bedtime  chlorhexidine 2% Cloths 1 Application(s) Topical daily  esmolol  Infusion 49.951 MICROgram(s)/kG/Min (30.3 mL/Hr) IV Continuous <Continuous>  ferrous    sulfate 325 milliGRAM(s) Oral daily  furosemide    Tablet 40 milliGRAM(s) Oral daily  metoprolol tartrate 50 milliGRAM(s) Oral every 8 hours  montelukast 10 milliGRAM(s) Oral daily  pantoprazole  Injectable 40 milliGRAM(s) IV Push every 12 hours  sacubitril 24 mG/valsartan 26 mG 1 Tablet(s) Oral two times a day  tamsulosin 0.4 milliGRAM(s) Oral at bedtime    PRN Medications  MEDICATIONS  (PRN):  albuterol/ipratropium for Nebulization 3 milliLiter(s) Nebulizer every 6 hours PRN Shortness of Breath and/or Wheezing

## 2023-05-16 NOTE — DIETITIAN INITIAL EVALUATION ADULT - PERTINENT MEDS FT
MEDICATIONS  (STANDING):  atorvastatin 20 milliGRAM(s) Oral at bedtime  chlorhexidine 2% Cloths 1 Application(s) Topical daily  esmolol  Infusion 49.951 MICROgram(s)/kG/Min (30.3 mL/Hr) IV Continuous <Continuous>  ferrous    sulfate 325 milliGRAM(s) Oral daily  furosemide    Tablet 40 milliGRAM(s) Oral daily  metoprolol tartrate 50 milliGRAM(s) Oral every 8 hours  montelukast 10 milliGRAM(s) Oral daily  pantoprazole  Injectable 40 milliGRAM(s) IV Push every 12 hours  sacubitril 24 mG/valsartan 26 mG 1 Tablet(s) Oral two times a day  tamsulosin 0.4 milliGRAM(s) Oral at bedtime    MEDICATIONS  (PRN):  albuterol/ipratropium for Nebulization 3 milliLiter(s) Nebulizer every 6 hours PRN Shortness of Breath and/or Wheezing

## 2023-05-16 NOTE — DIETITIAN INITIAL EVALUATION ADULT - ENERGY INTAKE
Reports pt with fair PO intake in-house due to preference for cultural food. No report of GI issues.

## 2023-05-17 LAB
ALBUMIN SERPL ELPH-MCNC: 3.7 G/DL — SIGNIFICANT CHANGE UP (ref 3.5–5.2)
ALP SERPL-CCNC: 58 U/L — SIGNIFICANT CHANGE UP (ref 30–115)
ALT FLD-CCNC: 11 U/L — SIGNIFICANT CHANGE UP (ref 0–41)
ANION GAP SERPL CALC-SCNC: 10 MMOL/L — SIGNIFICANT CHANGE UP (ref 7–14)
APTT BLD: 31.9 SEC — SIGNIFICANT CHANGE UP (ref 27–39.2)
AST SERPL-CCNC: 16 U/L — SIGNIFICANT CHANGE UP (ref 0–41)
BASOPHILS # BLD AUTO: 0.04 K/UL — SIGNIFICANT CHANGE UP (ref 0–0.2)
BASOPHILS NFR BLD AUTO: 0.7 % — SIGNIFICANT CHANGE UP (ref 0–1)
BILIRUB SERPL-MCNC: 1 MG/DL — SIGNIFICANT CHANGE UP (ref 0.2–1.2)
BUN SERPL-MCNC: 14 MG/DL — SIGNIFICANT CHANGE UP (ref 10–20)
CALCIUM SERPL-MCNC: 8.2 MG/DL — LOW (ref 8.4–10.4)
CHLORIDE SERPL-SCNC: 107 MMOL/L — SIGNIFICANT CHANGE UP (ref 98–110)
CO2 SERPL-SCNC: 27 MMOL/L — SIGNIFICANT CHANGE UP (ref 17–32)
CREAT SERPL-MCNC: 1.1 MG/DL — SIGNIFICANT CHANGE UP (ref 0.7–1.5)
EGFR: 70 ML/MIN/1.73M2 — SIGNIFICANT CHANGE UP
EOSINOPHIL # BLD AUTO: 0.17 K/UL — SIGNIFICANT CHANGE UP (ref 0–0.7)
EOSINOPHIL NFR BLD AUTO: 2.8 % — SIGNIFICANT CHANGE UP (ref 0–8)
GLUCOSE BLDC GLUCOMTR-MCNC: 98 MG/DL — SIGNIFICANT CHANGE UP (ref 70–99)
GLUCOSE SERPL-MCNC: 96 MG/DL — SIGNIFICANT CHANGE UP (ref 70–99)
HCT VFR BLD CALC: 28 % — LOW (ref 42–52)
HCT VFR BLD CALC: 29.9 % — LOW (ref 42–52)
HGB BLD-MCNC: 8.5 G/DL — LOW (ref 14–18)
HGB BLD-MCNC: 9 G/DL — LOW (ref 14–18)
IMM GRANULOCYTES NFR BLD AUTO: 0.3 % — SIGNIFICANT CHANGE UP (ref 0.1–0.3)
LYMPHOCYTES # BLD AUTO: 0.67 K/UL — LOW (ref 1.2–3.4)
LYMPHOCYTES # BLD AUTO: 10.9 % — LOW (ref 20.5–51.1)
MAGNESIUM SERPL-MCNC: 2 MG/DL — SIGNIFICANT CHANGE UP (ref 1.8–2.4)
MCHC RBC-ENTMCNC: 22.1 PG — LOW (ref 27–31)
MCHC RBC-ENTMCNC: 22.4 PG — LOW (ref 27–31)
MCHC RBC-ENTMCNC: 30.1 G/DL — LOW (ref 32–37)
MCHC RBC-ENTMCNC: 30.4 G/DL — LOW (ref 32–37)
MCV RBC AUTO: 73.5 FL — LOW (ref 80–94)
MCV RBC AUTO: 73.7 FL — LOW (ref 80–94)
MONOCYTES # BLD AUTO: 0.72 K/UL — HIGH (ref 0.1–0.6)
MONOCYTES NFR BLD AUTO: 11.7 % — HIGH (ref 1.7–9.3)
NEUTROPHILS # BLD AUTO: 4.51 K/UL — SIGNIFICANT CHANGE UP (ref 1.4–6.5)
NEUTROPHILS NFR BLD AUTO: 73.6 % — SIGNIFICANT CHANGE UP (ref 42.2–75.2)
NRBC # BLD: 0 /100 WBCS — SIGNIFICANT CHANGE UP (ref 0–0)
NRBC # BLD: 0 /100 WBCS — SIGNIFICANT CHANGE UP (ref 0–0)
PLATELET # BLD AUTO: 208 K/UL — SIGNIFICANT CHANGE UP (ref 130–400)
PLATELET # BLD AUTO: 219 K/UL — SIGNIFICANT CHANGE UP (ref 130–400)
PMV BLD: 9.3 FL — SIGNIFICANT CHANGE UP (ref 7.4–10.4)
PMV BLD: 9.5 FL — SIGNIFICANT CHANGE UP (ref 7.4–10.4)
POTASSIUM SERPL-MCNC: 3.3 MMOL/L — LOW (ref 3.5–5)
POTASSIUM SERPL-SCNC: 3.3 MMOL/L — LOW (ref 3.5–5)
PROT SERPL-MCNC: 5.9 G/DL — LOW (ref 6–8)
RBC # BLD: 3.8 M/UL — LOW (ref 4.7–6.1)
RBC # BLD: 4.07 M/UL — LOW (ref 4.7–6.1)
RBC # FLD: 24.1 % — HIGH (ref 11.5–14.5)
RBC # FLD: 24.2 % — HIGH (ref 11.5–14.5)
SODIUM SERPL-SCNC: 144 MMOL/L — SIGNIFICANT CHANGE UP (ref 135–146)
WBC # BLD: 6.13 K/UL — SIGNIFICANT CHANGE UP (ref 4.8–10.8)
WBC # BLD: 6.43 K/UL — SIGNIFICANT CHANGE UP (ref 4.8–10.8)
WBC # FLD AUTO: 6.13 K/UL — SIGNIFICANT CHANGE UP (ref 4.8–10.8)
WBC # FLD AUTO: 6.43 K/UL — SIGNIFICANT CHANGE UP (ref 4.8–10.8)

## 2023-05-17 PROCEDURE — 99233 SBSQ HOSP IP/OBS HIGH 50: CPT

## 2023-05-17 PROCEDURE — 71045 X-RAY EXAM CHEST 1 VIEW: CPT | Mod: 26

## 2023-05-17 RX ORDER — HEPARIN SODIUM 5000 [USP'U]/ML
INJECTION INTRAVENOUS; SUBCUTANEOUS
Qty: 25000 | Refills: 0 | Status: DISCONTINUED | OUTPATIENT
Start: 2023-05-17 | End: 2023-05-17

## 2023-05-17 RX ORDER — POTASSIUM CHLORIDE 20 MEQ
40 PACKET (EA) ORAL ONCE
Refills: 0 | Status: DISCONTINUED | OUTPATIENT
Start: 2023-05-17 | End: 2023-05-20

## 2023-05-17 RX ORDER — HEPARIN SODIUM 5000 [USP'U]/ML
4000 INJECTION INTRAVENOUS; SUBCUTANEOUS EVERY 6 HOURS
Refills: 0 | Status: DISCONTINUED | OUTPATIENT
Start: 2023-05-17 | End: 2023-05-17

## 2023-05-17 RX ORDER — POTASSIUM CHLORIDE 20 MEQ
20 PACKET (EA) ORAL
Refills: 0 | Status: DISCONTINUED | OUTPATIENT
Start: 2023-05-17 | End: 2023-05-17

## 2023-05-17 RX ORDER — HEPARIN SODIUM 5000 [USP'U]/ML
8500 INJECTION INTRAVENOUS; SUBCUTANEOUS ONCE
Refills: 0 | Status: DISCONTINUED | OUTPATIENT
Start: 2023-05-17 | End: 2023-05-17

## 2023-05-17 RX ORDER — ENOXAPARIN SODIUM 100 MG/ML
100 INJECTION SUBCUTANEOUS EVERY 12 HOURS
Refills: 0 | Status: DISCONTINUED | OUTPATIENT
Start: 2023-05-17 | End: 2023-05-20

## 2023-05-17 RX ORDER — HEPARIN SODIUM 5000 [USP'U]/ML
8500 INJECTION INTRAVENOUS; SUBCUTANEOUS EVERY 6 HOURS
Refills: 0 | Status: DISCONTINUED | OUTPATIENT
Start: 2023-05-17 | End: 2023-05-17

## 2023-05-17 RX ADMIN — PANTOPRAZOLE SODIUM 40 MILLIGRAM(S): 20 TABLET, DELAYED RELEASE ORAL at 18:11

## 2023-05-17 RX ADMIN — Medication 50 MILLIGRAM(S): at 15:32

## 2023-05-17 RX ADMIN — Medication 50 MILLIGRAM(S): at 05:45

## 2023-05-17 RX ADMIN — SACUBITRIL AND VALSARTAN 1 TABLET(S): 24; 26 TABLET, FILM COATED ORAL at 18:11

## 2023-05-17 RX ADMIN — Medication 325 MILLIGRAM(S): at 11:44

## 2023-05-17 RX ADMIN — ENOXAPARIN SODIUM 100 MILLIGRAM(S): 100 INJECTION SUBCUTANEOUS at 18:11

## 2023-05-17 RX ADMIN — Medication 50 MILLIGRAM(S): at 21:34

## 2023-05-17 RX ADMIN — MONTELUKAST 10 MILLIGRAM(S): 4 TABLET, CHEWABLE ORAL at 11:44

## 2023-05-17 RX ADMIN — CHLORHEXIDINE GLUCONATE 1 APPLICATION(S): 213 SOLUTION TOPICAL at 11:44

## 2023-05-17 RX ADMIN — SACUBITRIL AND VALSARTAN 1 TABLET(S): 24; 26 TABLET, FILM COATED ORAL at 05:45

## 2023-05-17 RX ADMIN — TAMSULOSIN HYDROCHLORIDE 0.4 MILLIGRAM(S): 0.4 CAPSULE ORAL at 21:34

## 2023-05-17 RX ADMIN — Medication 40 MILLIGRAM(S): at 05:45

## 2023-05-17 RX ADMIN — ATORVASTATIN CALCIUM 20 MILLIGRAM(S): 80 TABLET, FILM COATED ORAL at 21:34

## 2023-05-17 RX ADMIN — PANTOPRAZOLE SODIUM 40 MILLIGRAM(S): 20 TABLET, DELAYED RELEASE ORAL at 05:44

## 2023-05-17 NOTE — PROGRESS NOTE ADULT - ASSESSMENT
77yo M hx of afib on eliquis, HFrEF (unknown EF), CAD s/p CABG (8 years ago), HTN, asthma presented to the hospital for syncopal episode followed by unwitnessed fall. In the ED, had 50cc hematemesis from suspected upper GIB. Admitted to ICU, now downgraded to SDU     Suspected Upper GI Bleed/ Hematemesis   Acute blood loss anemia  - pt presented with hematemesis, epistaxis, and fall associated with hypotension, Hb 7.3 on admission, s/p 2 u prbc   - no further active bleeding since ED presentation  - f/u GI re: possible EGD   - IV PPI BID, CBC q12h, transfuse for Hgb < 7.0  - c/w Iron supplementation   - PT     Paroxysmal afib on eliquis  Acute type B aortic dissection (very short segment)  - started lopressor 50mg q8h, s/p esmolol drip in ICU, now off, eliquis on hold   - duplex negative for DVT  -  CTA chest aorta w/wo IV contrast: very short segment acute type B aortic dissection 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 extending few cm from aortic arch, no evidence of acute traumatic injury, arterial extravasation  - Vascular surgery consulted: strict BP control, no acute surgery, hold all AC for GIB  - CT Surgery consulted, rec BP control, fluid resuscitation for hypovolemia; keep HR < 80  - Cardio consulted: start esmolol drip goal HR 50s, add nicardipine if remains hypertensive, hold AC  - keep SBP < 120, HR < 60    HFpEF  CAD s/p CABG   - at home is on lasix 20mg qd entresto 24-26mg qd, eplerenone 25mg qd  - continue with entresto and Lasix 40mg daily, statin     Hx of asthma  - not in exacerbation, no wheezing    - c/w home montelukast 10mg qd     Pending: GI fu, monitor labs, BP, po intake, PT

## 2023-05-17 NOTE — CHART NOTE - NSCHARTNOTEFT_GEN_A_CORE
CCU Transfer Note    Transfer from: SDU to floor     H&P:  77yo M hx of afib on eliquis, HFrEF (unknown EF), CAD s/p CABG (8 years ago), HTN, asthma presented to the hospital for syncopal episode after feeling dizzy yesterday evening.  Patient speaks a dialect similar to Cantonese but is a village dialect. Patient is able to understand Cantonese, but 100%  will not be able to understand the patient's language. I understood 100% of patient's conversation.   Patient was feeling dizzy while walking to the bathroom at 10PM  followed by a fall. +LOC, + AC. Unsure if he had head trauma, but does not experience any headaches. Unwitnessed by family. Denies fevers, shortness of breath, chest pain, abdominal pain, diarrhea, constipation, dysuria.   Upon arrival to the ED, patient was complaining of having productive sputum needing to cough, epistaxis, and followed by ~50cc of hematemesis. Brought into hospital via EMS  At the ED, trauma alert was activated due to the fall. Dried blood found in the nares. VS was T-97.8F, BP: 97/54, HR: 71bpm, RR: 17bpm, SpO2: 96% on RA. After episode of hemoptysis, patient's SBP dropped from 89-->85. 2u prbc were ordered resulting in improvement of BP. Labs significant for microcytic anemia (Hb- 7.3), BUN- 33. troponin<0.01 x1.   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  CTA Abd+Pelvis: no acute pathology  Xray Pelvis: no acute fracture  Patient admitted to ICU for Upper GI bleed. Currently, VS stable: with SBP 110s. Given 2u prbc. started octrotide and protonix infusion. s/p ceftriaxone and erythromycin IV. given K centra 5g. Vascular surgery consulted for aortic dissection. No acute surgical intervention, Recommend keep -140 SBP.  per ED signout. GI consutled for GI Bleed. Plan for colonoscopy in today.       ICU COURSE: Cardiology, CT surgery, and Vascular Surgery evaluated patient for aortic dissection, stated no acute surgical intervention, and consult GI for GIB. Pt received 2u RBC in ED. No further active bleeding since ED presentation. GI following, initially plan was for EGD, but given high cardiac risk, will not do EGD until patient is more stable given no active bleeding.  Patient started on esmolol drip to keep HR <70 and SBP <140 for aortic dissection (read as acute on CTA chest, but may be chronic). Started lopressor 25mg q8h, restarted home entresto, and started lasix 40mg qd (on 20mg qd at home). Since 5/16 early afternoon pt's HR and BP have been controlled off esmolol drip. Repeat CBC stable. Downgrade to stepdown approved by Dr. Florentino Mathews.     CEU course     patient remained stable in ceu off esmolol drip and hemoglobin stable- now dgtf       MEDICATIONS:  STANDING MEDICATIONS  atorvastatin 20 milliGRAM(s) Oral at bedtime  chlorhexidine 2% Cloths 1 Application(s) Topical daily  ferrous    sulfate 325 milliGRAM(s) Oral daily  furosemide    Tablet 40 milliGRAM(s) Oral daily  metoprolol tartrate 50 milliGRAM(s) Oral every 8 hours  montelukast 10 milliGRAM(s) Oral daily  pantoprazole  Injectable 40 milliGRAM(s) IV Push every 12 hours  potassium chloride  20 mEq/100 mL IVPB 20 milliEquivalent(s) IV Intermittent every 2 hours  sacubitril 24 mG/valsartan 26 mG 1 Tablet(s) Oral two times a day  tamsulosin 0.4 milliGRAM(s) Oral at bedtime    PRN MEDICATIONS  albuterol/ipratropium for Nebulization 3 milliLiter(s) Nebulizer every 6 hours PRN      VITAL SIGNS: Last 24 Hours  T(C): 36.3 (17 May 2023 11:00), Max: 36.8 (17 May 2023 00:00)  T(F): 97.4 (17 May 2023 11:00), Max: 98.3 (17 May 2023 00:00)  HR: 60 (17 May 2023 11:00) (59 - 76)  BP: 110/59 (17 May 2023 11:00) (110/59 - 152/64)  BP(mean): 80 (17 May 2023 11:00) (80 - 95)  RR: 20 (17 May 2023 11:00) (19 - 34)  SpO2: 98% (17 May 2023 11:00) (95% - 99%)    LABS:                        8.5    6.13  )-----------( 208      ( 17 May 2023 06:44 )             28.0     05-17    144  |  107  |  14  ----------------------------<  96  3.3<L>   |  27  |  1.1    Ca    8.2<L>      17 May 2023 06:44  Mg     2.0     05-17    TPro  5.9<L>  /  Alb  3.7  /  TBili  1.0  /  DBili  x   /  AST  16  /  ALT  11  /  AlkPhos  58  05-17              77yo M hx of afib on eliquis, HFrEF (unknown EF), CAD s/p CABG (8 years ago), HTN, asthma presented to the hospital for syncopal episode followed by unwitnessed fall. At the ED, had 50cc hematemesis from suspected upper GIB. Admitted to ICU    #Suspected Upper GI Bleed  #Hematemesis   #Acute blood loss anemia - resolved s/p 2u RBC  - pt presented with hematemesis, epistaxis, and fall associated with hypotension  - Hgb 7.3 received 2u RBC, repeat Hgb stable  - no further active bleeding since ED presentation  - GI following -- no EGD until more stable given no active bleed   - IV PPI BID  - CBC q12h, transfuse for Hgb < 7.0  - ferrous sulfate 325mg qd     #Unwitnessed fall  - trauma workup CTH, CT spine, CT A/P, XR pelvis negative   - 5/12 echo: EF 68%, mild-mod AR, mild MR, mild TR, mild pHTN  - fall precautions    #Acute type B aortic dissection (very short segment)  - CTA chest aorta w/wo IV contrast: very short segment acute type B aortic dissection 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 extending few cm from aortic arch, no evidence of acute traumatic injury, arterial extravasation  - trop negative   - Vascular surgery consulted: strict BP control, no acute surgery, hold all AC for GIB  - CT Surgery consulted, rec BP control, fluid resuscitation for hypovolemia; keep HR < 80  - Cardio consulted: start esmolol drip goal HR 50s, add nicardipine if remains hypertensive, hold AC  - keep SBP < 120, HR < 60  - off esmolol drip  - started lopressor 25mg q8h     #H/o HFrEF (unknown EF) with now recovered EF  #CAD s/p CABG   - at home is on lasix 20mg qd entresto 24-26mg qd, eplerenone 25mg qd  - restart entresto  - increased lasix to 40mg qd   - no fluids  - repeat 5/12 echo: EF 68%, mild-mod AR, mild MR, mild TR, mild pHTN  - statin    #Paroxysmal afib on eliquis  - not on any rate control at home  - start lopressor 50mg q8h  - esmolol drip to keep HR < 60 for aortic dissection -- wean  - hold AC for GIB  - duplex negative for DVT    #Hx of asthma  - not in exacerbation  - SpO2 98% on RA, no wheezing   - c/w home montelukast 10mg qd     Misc  - DVT ppx: SCDs  - GI ppx: IV PPI BID  - Diet:  low fiber soft bite sized   - Activity: IAT  - Code status: FULL

## 2023-05-17 NOTE — CHART NOTE - NSCHARTNOTEFT_GEN_A_CORE
Spoke to gi fellow no intervention planned at this time. Patient with AFIB may resume short acting ac while inpatient.     Discussed with senior resident- will start heparin drip full ac protocol.

## 2023-05-17 NOTE — PROGRESS NOTE ADULT - SUBJECTIVE AND OBJECTIVE BOX
ZAID DU 78y Male  MRN#: [id number]   CODE STATUS:________    Hospital Day: 5d      H&P:  77yo M hx of afib on eliquis, HFrEF (unknown EF), CAD s/p CABG (8 years ago), HTN, asthma presented to the hospital for syncopal episode after feeling dizzy yesterday evening.  Patient speaks a dialect similar to Cantonese but is a village dialect. Patient is able to understand Cantonese, but 100%  will not be able to understand the patient's language. I understood 100% of patient's conversation.   Patient was feeling dizzy while walking to the bathroom at 10PM  followed by a fall. +LOC, + AC. Unsure if he had head trauma, but does not experience any headaches. Unwitnessed by family. Denies fevers, shortness of breath, chest pain, abdominal pain, diarrhea, constipation, dysuria.   Upon arrival to the ED, patient was complaining of having productive sputum needing to cough, epistaxis, and followed by ~50cc of hematemesis. Brought into hospital via EMS  At the ED, trauma alert was activated due to the fall. Dried blood found in the nares. VS was T-97.8F, BP: 97/54, HR: 71bpm, RR: 17bpm, SpO2: 96% on RA. After episode of hemoptysis, patient's SBP dropped from 89-->85. 2u prbc were ordered resulting in improvement of BP. Labs significant for microcytic anemia (Hb- 7.3), BUN- 33. troponin<0.01 x1.   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  CTA Abd+Pelvis: no acute pathology  Xray Pelvis: no acute fracture  Patient admitted to ICU for Upper GI bleed. Currently, VS stable: with SBP 110s. Given 2u prbc. started octrotide and protonix infusion. s/p ceftriaxone and erythromycin IV. given K centra 5g. Vascular surgery consulted for aortic dissection. No acute surgical intervention, Recommend keep -140 SBP.  per ED signout. GI consutled for GI Bleed. Plan for colonoscopy in today.       ICU COURSE: Cardiology, CT surgery, and Vascular Surgery evaluated patient for aortic dissection, stated no acute surgical intervention, and consult GI for GIB. Pt received 2u RBC in ED. No further active bleeding since ED presentation. GI following, initially plan was for EGD, but given high cardiac risk, will not do EGD until patient is more stable given no active bleeding.  Patient started on esmolol drip to keep HR <70 and SBP <140 for aortic dissection (read as acute on CTA chest, but may be chronic). Started lopressor 25mg q8h, restarted home entresto, and started lasix 40mg qd (on 20mg qd at home). Since 5/16 early afternoon pt's HR and BP have been controlled off esmolol drip. Repeat CBC stable. Downgrade to stepdown approved by Dr. Florentino Mathews.     Patient in stepdown unit remains stable holding eliquis pending vascular approval for resumption                                            OBJECTIVE  PAST MEDICAL & SURGICAL HISTORY  Hypertension    CAD (coronary artery disease)    S/P CABG x 1    Unspecified atrial fibrillation    Chronic systolic congestive heart failure    Asthma                                                ALLERGIES:  No Known Allergies                           HOME MEDICATIONS  Home Medications:  atorvastatin 20 mg oral tablet: 1 tab(s) orally once a day (12 May 2023 06:31)  benzonatate 100 mg oral capsule: 2 cap(s) orally 3 times a day as needed for  cough (12 May 2023 13:49)  docusate sodium 100 mg oral tablet: 1 tab(s) orally once a day (12 May 2023 13:48)  Eliquis 5 mg oral tablet: 1 tab(s) orally 2 times a day (12 May 2023 06:31)  Entresto 24 mg-26 mg oral tablet: 1 tab(s) orally once a day (12 May 2023 06:31)  eplerenone 25 mg oral tablet: 1 tab(s) orally once a day (12 May 2023 06:31)  eplerenone 25 mg oral tablet: 1 tab(s) orally once a day (12 May 2023 13:50)  ferrous fumarate 325 mg (106 mg elemental iron) oral tablet: 1 tab(s) orally every 48 hours (12 May 2023 06:31)  furosemide 20 mg oral tablet: 1 tab(s) orally once a day (12 May 2023 06:31)  meclizine 25 mg oral tablet: 1 tab(s) orally once a day (12 May 2023 06:31)  montelukast 10 mg oral tablet: 1 tab(s) orally once a day (12 May 2023 06:31)  tamsulosin 0.4 mg oral capsule: 1 cap(s) orally once a day (12 May 2023 06:31)                           MEDICATIONS:  STANDING MEDICATIONS  atorvastatin 20 milliGRAM(s) Oral at bedtime  chlorhexidine 2% Cloths 1 Application(s) Topical daily  esmolol  Infusion 49.951 MICROgram(s)/kG/Min IV Continuous <Continuous>  ferrous    sulfate 325 milliGRAM(s) Oral daily  furosemide    Tablet 40 milliGRAM(s) Oral daily  metoprolol tartrate 50 milliGRAM(s) Oral every 8 hours  montelukast 10 milliGRAM(s) Oral daily  pantoprazole  Injectable 40 milliGRAM(s) IV Push every 12 hours  sacubitril 24 mG/valsartan 26 mG 1 Tablet(s) Oral two times a day  tamsulosin 0.4 milliGRAM(s) Oral at bedtime    PRN MEDICATIONS  albuterol/ipratropium for Nebulization 3 milliLiter(s) Nebulizer every 6 hours PRN                                            ------------------------------------------------------------  VITAL SIGNS: Last 24 Hours  T(C): 36.2 (17 May 2023 07:00), Max: 36.8 (17 May 2023 00:00)  T(F): 97.2 (17 May 2023 07:00), Max: 98.3 (17 May 2023 00:00)  HR: 59 (17 May 2023 07:00) (59 - 76)  BP: 132/59 (17 May 2023 07:00) (117/60 - 152/64)  BP(mean): 85 (17 May 2023 07:00) (85 - 95)  RR: 20 (17 May 2023 07:00) (19 - 34)  SpO2: 96% (17 May 2023 07:00) (95% - 99%)      05-16-23 @ 07:01  -  05-17-23 @ 07:00  --------------------------------------------------------  IN: 371.6 mL / OUT: 2750 mL / NET: -2378.4 mL                                               LABS:                        8.5    6.13  )-----------( 208      ( 17 May 2023 06:44 )             28.0     05-17    144  |  107  |  14  ----------------------------<  96  3.3<L>   |  27  |  1.1    Ca    8.2<L>      17 May 2023 06:44  Mg     2.0     05-17    TPro  5.9<L>  /  Alb  3.7  /  TBili  1.0  /  DBili  x   /  AST  16  /  ALT  11  /  AlkPhos  58  05-17                                                              RADIOLOGY:        PHYSICAL EXAM:  GENERAL: NAD, lying in bed comfortably  HEAD:  Atraumatic, Normocephalic  EYES: EOMI, PERRLA, conjunctiva and sclera clear  ENT: Moist mucous membranes  NECK: Supple, No JVD  CHEST/LUNG: rhonchi diffuse No rales expiratory wheezing bilateral bases  Unlabored respirations  HEART: afib systolic murmur  ABDOMEN: BSx4; Soft, nontender, nondistended  EXTREMITIES:  2+ Peripheral Pulses, brisk capillary refill. No clubbing, cyanosis, or edema  NERVOUS SYSTEM:  A&Ox3, [place] speaking  no focal deficits                                            ASSESSMENT & PLAN    ASSESSMENT & PLAN:   77yo M hx of afib on eliquis, HFrEF (unknown EF), CAD s/p CABG (8 years ago), HTN, asthma presented to the hospital for syncopal episode followed by unwitnessed fall. At the ED, had 50cc hematemesis from suspected upper GIB. Admitted to ICU    #Suspected Upper GI Bleed  #Hematemesis   #Acute blood loss anemia - resolved s/p 2u RBC  - pt presented with hematemesis, epistaxis, and fall associated with hypotension  - Hgb 7.3 received 2u RBC, repeat Hgb stable  - no further active bleeding since ED presentation  - GI following -- no EGD until more stable given no active bleed   - IV PPI BID  - CBC q12h, transfuse for Hgb < 7.0  - ferrous sulfate 325mg qd     #Unwitnessed fall  - trauma workup CTH, CT spine, CT A/P, XR pelvis negative   - 5/12 echo: EF 68%, mild-mod AR, mild MR, mild TR, mild pHTN  - fall precautions    #Acute type B aortic dissection (very short segment)  - CTA chest aorta w/wo IV contrast: very short segment acute type B aortic dissection 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 extending few cm from aortic arch, no evidence of acute traumatic injury, arterial extravasation  - trop negative   - Vascular surgery consulted: strict BP control, no acute surgery, hold all AC for GIB  - CT Surgery consulted, rec BP control, fluid resuscitation for hypovolemia; keep HR < 80  - Cardio consulted: start esmolol drip goal HR 50s, add nicardipine if remains hypertensive, hold AC  - keep SBP < 120, HR < 60  - off esmolol drip  - started lopressor 25mg q8h     #H/o HFrEF (unknown EF) with now recovered EF  #CAD s/p CABG   - at home is on lasix 20mg qd entresto 24-26mg qd, eplerenone 25mg qd  - restart entresto  - increased lasix to 40mg qd   - no fluids  - repeat 5/12 echo: EF 68%, mild-mod AR, mild MR, mild TR, mild pHTN  - statin    #Paroxysmal afib on eliquis  - not on any rate control at home  - start lopressor 50mg q8h  - esmolol drip to keep HR < 60 for aortic dissection -- wean  - hold AC for GIB  - duplex negative for DVT    #Hx of asthma  - not in exacerbation  - SpO2 98% on RA, no wheezing   - c/w home montelukast 10mg qd     Misc  - DVT ppx: SCDs  - GI ppx: IV PPI BID  - Diet:  low fiber soft bite sized   - Activity: IAT  - Code status: FULL

## 2023-05-17 NOTE — PROGRESS NOTE ADULT - SUBJECTIVE AND OBJECTIVE BOX
ZAID DU  78y Male    CHIEF COMPLAINT:    Patient is a 78y old  Male who presents with a chief complaint of upper GI bleed (17 May 2023 09:32)    INTERVAL HPI/OVERNIGHT EVENTS:    Patient seen and examined. No acute events overnight. Transferred from MICU, on NC    ROS: All other systems are negative.    Vital Signs:    T(F): 97.2 (23 @ 07:00), Max: 98.3 (23 @ 00:00)  HR: 59 (23 @ 07:00) (59 - 76)  BP: 132/59 (23 @ 07:00) (117/60 - 152/64)  RR: 20 (23 @ 07:00) (19 - 34)  SpO2: 96% (23 @ 07:00) (95% - 99%)    16 May 2023 07:01  -  17 May 2023 07:00  --------------------------------------------------------  IN: 371.6 mL / OUT: 2750 mL / NET: -2378.4 mL    17 May 2023 07:01  -  17 May 2023 11:38  --------------------------------------------------------  IN: 0 mL / OUT: 300 mL / NET: -300 mL    Daily Weight in k.5 (17 May 2023 00:00)    PHYSICAL EXAM:    GENERAL:  NAD  SKIN: No rashes or lesions  HEENT: Atraumatic. Normocephalic.   NECK: Supple, No JVD.   PULMONARY: decreased breath sounds B/L. No wheezing.   CVS: Normal S1, S2. Rate and Rhythm are regular.   ABDOMEN/GI: Soft, Nontender, Nondistended   MSK:  No clubbing or cyanosis   NEUROLOGIC:  Moves all extremities   PSYCH: Awake and alert     Consultant(s) Notes Reviewed:  [x ] YES  [ ] NO  Care Discussed with Consultants/Other Providers [ x] YES  [ ] NO    LABS:                        8.5    6.13  )-----------( 208      ( 17 May 2023 06:44 )             28.0     144  |  107  |  14  ----------------------------<  96  3.3<L>   |  27  |  1.1    Ca    8.2<L>      17 May 2023 06:44  Mg     2.0         TPro  5.9<L>  /  Alb  3.7  /  TBili  1.0  /  DBili  x   /  AST  16  /  ALT  11  /  AlkPhos  58      RADIOLOGY & ADDITIONAL TESTS:  Imaging or report Personally Reviewed:  [x] YES  [ ] NO  EKG reviewed: [x] YES  [ ] NO    Medications:  Standing  atorvastatin 20 milliGRAM(s) Oral at bedtime  chlorhexidine 2% Cloths 1 Application(s) Topical daily  ferrous    sulfate 325 milliGRAM(s) Oral daily  furosemide    Tablet 40 milliGRAM(s) Oral daily  metoprolol tartrate 50 milliGRAM(s) Oral every 8 hours  montelukast 10 milliGRAM(s) Oral daily  pantoprazole  Injectable 40 milliGRAM(s) IV Push every 12 hours  sacubitril 24 mG/valsartan 26 mG 1 Tablet(s) Oral two times a day  tamsulosin 0.4 milliGRAM(s) Oral at bedtime    PRN Meds  albuterol/ipratropium for Nebulization 3 milliLiter(s) Nebulizer every 6 hours PRN

## 2023-05-17 NOTE — PROGRESS NOTE ADULT - ASSESSMENT
IMPRESSION:    Syncope on presentation, resolved with no further episode   Type B Aortic Dissection SP CTS eval   Possible UGIB, Hgb stable SP 2 units PRBC  HO HFpEF   HO CAD s/p CABG  HO Chronic AFIB was on Eliquis    PLAN:    CNS: Avoid CNS depressants    HEENT: Oral care    PULMONARY:  HOB @ 45 degrees. Aspiration precautions. Wean oxygen as tolerated, target SaO2 92-96%.    CARDIOVASCULAR: Avoid volume overload, -1.9L/24 hours, Continue PO Lasix 40mg Q24H, continue Metoprolol, Entresto    GI: PPI BID. Advance diet as tolerated, bowel regimen, GI follow up for possible EGD    RENAL: Follow up lytes. Correct as needed.     INFECTIOUS DISEASE: Follow up cultures, monitoring off ABX, procalcitonin 0.08    HEMATOLOGICAL:  Sequentials. LE duplex negative 5/15, monitor CBC, maintain active type and screen    ENDOCRINE:  Follow up FS.     MUSCULOSKELETAL: OOB to chair.     Full Code    Possible downgrade in PM  IMPRESSION:    Syncope on presentation, resolved with no further episode   Type B Aortic Dissection SP CTS eval   Possible UGIB, Hgb stable SP 2 units PRBC  HO HFpEF   HO CAD s/p CABG  HO Chronic AFIB was on Eliquis    PLAN:    CNS: Avoid CNS depressants    HEENT: Oral care    PULMONARY:  HOB @ 45 degrees. Aspiration precautions. Wean oxygen as tolerated, target SaO2 92-96%.    CARDIOVASCULAR: Avoid volume overload, -1.9L/24 hours, Continue PO Lasix 40mg Q24H, continue Metoprolol, Entresto    GI: PPI BID. Advance diet as tolerated, bowel regimen, GI follow up for possible EGD    RENAL: Follow up lytes. Correct as needed.     INFECTIOUS DISEASE: Follow up cultures, monitoring off ABX, procalcitonin 0.08    HEMATOLOGICAL:  Sequentials. LE duplex negative 5/15, monitor CBC, maintain active type and screen    ENDOCRINE:  Follow up FS.     MUSCULOSKELETAL: OOB to chair.     Full Code    Downgrade in PM

## 2023-05-17 NOTE — PROGRESS NOTE ADULT - SUBJECTIVE AND OBJECTIVE BOX
Patient is a 78y old  Male who presents with a chief complaint of syncopal episode found to UGIB (16 May 2023 09:14)      Over Night Events: no acute events overnight, on 3L NC, off esmolol drip     Vital Signs Last 24 Hrs  T(C): 36.2 (17 May 2023 07:00), Max: 36.8 (17 May 2023 00:00)  T(F): 97.2 (17 May 2023 07:00), Max: 98.3 (17 May 2023 00:00)  HR: 59 (17 May 2023 07:00) (59 - 76)  BP: 132/59 (17 May 2023 07:00) (117/60 - 152/64)  BP(mean): 85 (17 May 2023 07:00) (85 - 95)  ABP: 104/73 (16 May 2023 18:00) (104/73 - 145/64)  ABP(mean): 90 (16 May 2023 18:00) (77 - 94)  RR: 20 (17 May 2023 07:00) (19 - 34)  SpO2: 96% (17 May 2023 07:00) (95% - 99%)    O2 Parameters below as of 17 May 2023 07:00  Patient On (Oxygen Delivery Method): nasal cannula          CONSTITUTIONAL:  Elderly, NAD    ENT:   Airway patent,   Mouth with normal mucosa.   No thrush    EYES:   Pupils equal,   Round and reactive to light.    CARDIAC:   Normal rate,   Regular rhythm.    Bilateral lower extremity edema     RESPIRATORY:   Decreased bilateral air entry   No wheezing  Bilateral BS  Normal chest expansion  Not tachypneic,  No use of accessory muscles    GASTROINTESTINAL:  Abdomen soft,   Non-tender,   No guarding,   + BS      MUSCULOSKELETAL:   Range of motion is not limited,    NEUROLOGICAL:   Alert and oriented       SKIN:   Skin normal color for race,   Warm and dry  No evidence of rash.    PSYCHIATRIC:   No apparent risk to self or others.      05-16-23 @ 07:01  -  05-17-23 @ 07:00  --------------------------------------------------------  IN:    Esmolol: 151.6 mL    Oral Fluid: 220 mL  Total IN: 371.6 mL    OUT:    Voided (mL): 2750 mL  Total OUT: 2750 mL    Total NET: -2378.4 mL          LABS:                            8.7    7.78  )-----------( 227      ( 16 May 2023 14:00 )             28.5                                               05-16    137  |  106  |  15  ----------------------------<  76  4.0   |  18  |  0.9    Ca    8.0<L>      16 May 2023 05:15  Mg     2.0     05-16    TPro  5.5<L>  /  Alb  3.5  /  TBili  0.8  /  DBili  x   /  AST  15  /  ALT  9   /  AlkPhos  56  05-16                                                                                           LIVER FUNCTIONS - ( 16 May 2023 05:15 )  Alb: 3.5 g/dL / Pro: 5.5 g/dL / ALK PHOS: 56 U/L / ALT: 9 U/L / AST: 15 U/L / GGT: x                                                                                                                                       MEDICATIONS  (STANDING):  atorvastatin 20 milliGRAM(s) Oral at bedtime  chlorhexidine 2% Cloths 1 Application(s) Topical daily  ferrous    sulfate 325 milliGRAM(s) Oral daily  furosemide    Tablet 40 milliGRAM(s) Oral daily  metoprolol tartrate 50 milliGRAM(s) Oral every 8 hours  montelukast 10 milliGRAM(s) Oral daily  pantoprazole  Injectable 40 milliGRAM(s) IV Push every 12 hours  sacubitril 24 mG/valsartan 26 mG 1 Tablet(s) Oral two times a day  tamsulosin 0.4 milliGRAM(s) Oral at bedtime    MEDICATIONS  (PRN):  albuterol/ipratropium for Nebulization 3 milliLiter(s) Nebulizer every 6 hours PRN Shortness of Breath and/or Wheezing      CXR reviewed

## 2023-05-18 LAB
ALBUMIN SERPL ELPH-MCNC: 3.7 G/DL — SIGNIFICANT CHANGE UP (ref 3.5–5.2)
ALP SERPL-CCNC: 61 U/L — SIGNIFICANT CHANGE UP (ref 30–115)
ALT FLD-CCNC: 12 U/L — SIGNIFICANT CHANGE UP (ref 0–41)
ANION GAP SERPL CALC-SCNC: 10 MMOL/L — SIGNIFICANT CHANGE UP (ref 7–14)
ANION GAP SERPL CALC-SCNC: 11 MMOL/L — SIGNIFICANT CHANGE UP (ref 7–14)
AST SERPL-CCNC: 18 U/L — SIGNIFICANT CHANGE UP (ref 0–41)
BASOPHILS # BLD AUTO: 0.05 K/UL — SIGNIFICANT CHANGE UP (ref 0–0.2)
BASOPHILS NFR BLD AUTO: 1 % — SIGNIFICANT CHANGE UP (ref 0–1)
BILIRUB SERPL-MCNC: 1.1 MG/DL — SIGNIFICANT CHANGE UP (ref 0.2–1.2)
BLD GP AB SCN SERPL QL: SIGNIFICANT CHANGE UP
BUN SERPL-MCNC: 13 MG/DL — SIGNIFICANT CHANGE UP (ref 10–20)
BUN SERPL-MCNC: 14 MG/DL — SIGNIFICANT CHANGE UP (ref 10–20)
CALCIUM SERPL-MCNC: 7.4 MG/DL — LOW (ref 8.4–10.5)
CALCIUM SERPL-MCNC: 7.9 MG/DL — LOW (ref 8.4–10.4)
CHLORIDE SERPL-SCNC: 101 MMOL/L — SIGNIFICANT CHANGE UP (ref 98–110)
CHLORIDE SERPL-SCNC: 104 MMOL/L — SIGNIFICANT CHANGE UP (ref 98–110)
CO2 SERPL-SCNC: 26 MMOL/L — SIGNIFICANT CHANGE UP (ref 17–32)
CO2 SERPL-SCNC: 28 MMOL/L — SIGNIFICANT CHANGE UP (ref 17–32)
CREAT SERPL-MCNC: 1 MG/DL — SIGNIFICANT CHANGE UP (ref 0.7–1.5)
CREAT SERPL-MCNC: 1.2 MG/DL — SIGNIFICANT CHANGE UP (ref 0.7–1.5)
EGFR: 63 ML/MIN/1.73M2 — SIGNIFICANT CHANGE UP
EGFR: 78 ML/MIN/1.73M2 — SIGNIFICANT CHANGE UP
EOSINOPHIL # BLD AUTO: 0.19 K/UL — SIGNIFICANT CHANGE UP (ref 0–0.7)
EOSINOPHIL NFR BLD AUTO: 3.6 % — SIGNIFICANT CHANGE UP (ref 0–8)
GLUCOSE BLDC GLUCOMTR-MCNC: 118 MG/DL — HIGH (ref 70–99)
GLUCOSE BLDC GLUCOMTR-MCNC: 120 MG/DL — HIGH (ref 70–99)
GLUCOSE BLDC GLUCOMTR-MCNC: 90 MG/DL — SIGNIFICANT CHANGE UP (ref 70–99)
GLUCOSE BLDC GLUCOMTR-MCNC: 98 MG/DL — SIGNIFICANT CHANGE UP (ref 70–99)
GLUCOSE SERPL-MCNC: 105 MG/DL — HIGH (ref 70–99)
GLUCOSE SERPL-MCNC: 89 MG/DL — SIGNIFICANT CHANGE UP (ref 70–99)
HCT VFR BLD CALC: 28.7 % — LOW (ref 42–52)
HGB BLD-MCNC: 8.6 G/DL — LOW (ref 14–18)
IMM GRANULOCYTES NFR BLD AUTO: 0.4 % — HIGH (ref 0.1–0.3)
LYMPHOCYTES # BLD AUTO: 0.76 K/UL — LOW (ref 1.2–3.4)
LYMPHOCYTES # BLD AUTO: 14.5 % — LOW (ref 20.5–51.1)
MAGNESIUM SERPL-MCNC: 2 MG/DL — SIGNIFICANT CHANGE UP (ref 1.8–2.4)
MCHC RBC-ENTMCNC: 22.3 PG — LOW (ref 27–31)
MCHC RBC-ENTMCNC: 30 G/DL — LOW (ref 32–37)
MCV RBC AUTO: 74.5 FL — LOW (ref 80–94)
MONOCYTES # BLD AUTO: 0.6 K/UL — SIGNIFICANT CHANGE UP (ref 0.1–0.6)
MONOCYTES NFR BLD AUTO: 11.5 % — HIGH (ref 1.7–9.3)
NEUTROPHILS # BLD AUTO: 3.62 K/UL — SIGNIFICANT CHANGE UP (ref 1.4–6.5)
NEUTROPHILS NFR BLD AUTO: 69 % — SIGNIFICANT CHANGE UP (ref 42.2–75.2)
NRBC # BLD: 0 /100 WBCS — SIGNIFICANT CHANGE UP (ref 0–0)
PLATELET # BLD AUTO: 229 K/UL — SIGNIFICANT CHANGE UP (ref 130–400)
PMV BLD: 9.9 FL — SIGNIFICANT CHANGE UP (ref 7.4–10.4)
POTASSIUM SERPL-MCNC: 2.9 MMOL/L — LOW (ref 3.5–5)
POTASSIUM SERPL-MCNC: 3.1 MMOL/L — LOW (ref 3.5–5)
POTASSIUM SERPL-SCNC: 2.9 MMOL/L — LOW (ref 3.5–5)
POTASSIUM SERPL-SCNC: 3.1 MMOL/L — LOW (ref 3.5–5)
PROT SERPL-MCNC: 5.7 G/DL — LOW (ref 6–8)
RBC # BLD: 3.85 M/UL — LOW (ref 4.7–6.1)
RBC # FLD: 24.1 % — HIGH (ref 11.5–14.5)
SODIUM SERPL-SCNC: 138 MMOL/L — SIGNIFICANT CHANGE UP (ref 135–146)
SODIUM SERPL-SCNC: 142 MMOL/L — SIGNIFICANT CHANGE UP (ref 135–146)
WBC # BLD: 5.24 K/UL — SIGNIFICANT CHANGE UP (ref 4.8–10.8)
WBC # FLD AUTO: 5.24 K/UL — SIGNIFICANT CHANGE UP (ref 4.8–10.8)

## 2023-05-18 PROCEDURE — 99233 SBSQ HOSP IP/OBS HIGH 50: CPT

## 2023-05-18 RX ORDER — PANTOPRAZOLE SODIUM 20 MG/1
40 TABLET, DELAYED RELEASE ORAL
Refills: 0 | Status: DISCONTINUED | OUTPATIENT
Start: 2023-05-18 | End: 2023-05-20

## 2023-05-18 RX ORDER — SODIUM CHLORIDE 9 MG/ML
500 INJECTION, SOLUTION INTRAVENOUS ONCE
Refills: 0 | Status: DISCONTINUED | OUTPATIENT
Start: 2023-05-18 | End: 2023-05-20

## 2023-05-18 RX ORDER — FUROSEMIDE 40 MG
20 TABLET ORAL DAILY
Refills: 0 | Status: DISCONTINUED | OUTPATIENT
Start: 2023-05-18 | End: 2023-05-18

## 2023-05-18 RX ADMIN — MONTELUKAST 10 MILLIGRAM(S): 4 TABLET, CHEWABLE ORAL at 11:01

## 2023-05-18 RX ADMIN — Medication 50 MILLIGRAM(S): at 05:23

## 2023-05-18 RX ADMIN — CHLORHEXIDINE GLUCONATE 1 APPLICATION(S): 213 SOLUTION TOPICAL at 11:01

## 2023-05-18 RX ADMIN — ENOXAPARIN SODIUM 100 MILLIGRAM(S): 100 INJECTION SUBCUTANEOUS at 17:24

## 2023-05-18 RX ADMIN — ENOXAPARIN SODIUM 100 MILLIGRAM(S): 100 INJECTION SUBCUTANEOUS at 06:02

## 2023-05-18 RX ADMIN — Medication 325 MILLIGRAM(S): at 11:01

## 2023-05-18 RX ADMIN — PANTOPRAZOLE SODIUM 40 MILLIGRAM(S): 20 TABLET, DELAYED RELEASE ORAL at 17:24

## 2023-05-18 RX ADMIN — SACUBITRIL AND VALSARTAN 1 TABLET(S): 24; 26 TABLET, FILM COATED ORAL at 17:24

## 2023-05-18 RX ADMIN — Medication 40 MILLIGRAM(S): at 05:23

## 2023-05-18 RX ADMIN — TAMSULOSIN HYDROCHLORIDE 0.4 MILLIGRAM(S): 0.4 CAPSULE ORAL at 21:12

## 2023-05-18 RX ADMIN — ATORVASTATIN CALCIUM 20 MILLIGRAM(S): 80 TABLET, FILM COATED ORAL at 21:12

## 2023-05-18 RX ADMIN — PANTOPRAZOLE SODIUM 40 MILLIGRAM(S): 20 TABLET, DELAYED RELEASE ORAL at 05:22

## 2023-05-18 RX ADMIN — SACUBITRIL AND VALSARTAN 1 TABLET(S): 24; 26 TABLET, FILM COATED ORAL at 05:23

## 2023-05-18 NOTE — PROGRESS NOTE ADULT - ASSESSMENT
H&P:  77yo M hx of afib on eliquis, HFrEF (unknown EF), CAD s/p CABG (8 years ago), HTN, asthma presented to the hospital for syncopal episode after feeling dizzy yesterday evening.  Patient speaks a dialect similar to Cantonese but is a village dialect. Patient is able to understand Cantonese, but 100%  will not be able to understand the patient's language. I understood 100% of patient's conversation.   Patient was feeling dizzy while walking to the bathroom at 10PM  followed by a fall. +LOC, + AC. Unsure if he had head trauma, but does not experience any headaches. Unwitnessed by family. Denies fevers, shortness of breath, chest pain, abdominal pain, diarrhea, constipation, dysuria.   Upon arrival to the ED, patient was complaining of having productive sputum needing to cough, epistaxis, and followed by ~50cc of hematemesis. Brought into hospital via EMS  At the ED, trauma alert was activated due to the fall. Dried blood found in the nares. VS was T-97.8F, BP: 97/54, HR: 71bpm, RR: 17bpm, SpO2: 96% on RA. After episode of hemoptysis, patient's SBP dropped from 89-->85. 2u prbc were ordered resulting in improvement of BP. Labs significant for microcytic anemia (Hb- 7.3), BUN- 33. troponin<0.01 x1.   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  CTA Abd+Pelvis: no acute pathology  Xray Pelvis: no acute fracture  Patient admitted to ICU for Upper GI bleed. Currently, VS stable: with SBP 110s. Given 2u prbc. started octreotide and Protonix infusion. s/p ceftriaxone and erythromycin IV. given K centra 5g. Vascular surgery consulted for aortic dissection. No acute surgical intervention, Recommend keep -140 SBP.  per ED signout. GI consutled for GI Bleed.     ICU COURSE:   Cardiology, CT surgery, and Vascular Surgery evaluated patient for aortic dissection, stated no acute surgical intervention, and consult GI for GIB. Pt received 2u RBC in ED. No further active bleeding since ED presentation. GI following, initially plan was for EGD, but given high cardiac risk, will not do EGD until patient is more stable given no active bleeding.  Patient started on esmolol drip to keep HR <70 and SBP <140 for aortic dissection (read as acute on CTA chest, but may be chronic). Started lopressor 25mg q8h, restarted home entresto, and started lasix 40mg qd (on 20mg qd at home). Since 5/16 early afternoon pt's HR and BP have been controlled off esmolol drip. Repeat CBC stable. Downgrade to stepdown approved by Dr. Florentino Mathews.     CEU course   patient remained stable in ceu off esmolol drip and hemoglobin stable- now dgtf 5/17      #Suspected Upper GI Bleed  #Hematemesis   #Acute blood loss anemia - resolved s/p 2u RBC  - pt presented with hematemesis, epistaxis, and fall associated with hypotension  - Hgb 7.3 received 2u RBC, repeat Hgb stable  - no further active bleeding since ED presentation  - GI following -- no EGD, now it is planned as an outpatient   - IV PPI BID--> switch to PO   - CBC q12h, transfuse for Hgb < 7.0  - ferrous sulfate 325mg qd     #Unwitnessed fall  - trauma workup CTH, CT spine, CT A/P, XR pelvis negative   - 5/12 echo: EF 68%, mild-mod AR, mild MR, mild TR, mild pHTN  - fall precautions    #Acute type B aortic dissection (very short segment)  - CTA chest aorta w/wo IV contrast: very short segment acute type B aortic dissection 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 extending few cm from aortic arch, no evidence of acute traumatic injury, arterial extravasation  - trop negative   - Vascular surgery consulted: strict BP control, no acute surgery, hold all AC for GIB  - CT Surgery consulted, rec BP control, fluid resuscitation for hypovolemia; keep HR < 80  - Cardio consulted: start esmolol drip goal HR 50s, add nicardipine if remains hypertensive, hold AC  - keep SBP < 120, HR < 60  - off esmolol drip  - continue lopressor 25mg q8h     #H/o HFrEF (unknown EF) with now recovered EF  #CAD s/p CABG   - at home is on lasix 20mg qd entresto 24-26mg qd, eplerenone 25mg qd  - continue entresto  - 5/18 stopped lasix due to hypotension, continue to evaluate    - no fluids  - repeat 5/12 echo: EF 68%, mild-mod AR, mild MR, mild TR, mild pHTN  - statin    #Paroxysmal afib on eliquis  - not on any rate control at home  - continue lopressor 50mg q8h  - esmolol drip to keep HR < 60 for aortic dissection -- wean  - hold AC for GIB  - duplex negative for DVT    #Hx of asthma  - not in exacerbation  - SpO2 98% on RA, no wheezing   - c/w home montelukast 10mg qd   - incentive spirometer ordered  - pt states he uses oxygen at home     Misc  - DVT ppx: SCDs  - GI ppx: IV PPI BID  - Diet:  low fiber soft bite sized   - Activity: IAT  - Code status: FULL.  - Pending: PT eval, BP reassessment   - Family: son updated at bedside 5/18

## 2023-05-18 NOTE — PROGRESS NOTE ADULT - SUBJECTIVE AND OBJECTIVE BOX
Patient is a 78y old  Male who presents with a chief complaint of upper GI bleed (18 May 2023 11:05)      Patient seen and examined at bedside.  Patient denies any chest pain, reports some shortness of breath with movement   ALLERGIES:  No Known Allergies    MEDICATIONS:  albuterol/ipratropium for Nebulization 3 milliLiter(s) Nebulizer every 6 hours PRN  atorvastatin 20 milliGRAM(s) Oral at bedtime  chlorhexidine 2% Cloths 1 Application(s) Topical daily  enoxaparin Injectable 100 milliGRAM(s) SubCutaneous every 12 hours  ferrous    sulfate 325 milliGRAM(s) Oral daily  metoprolol tartrate 50 milliGRAM(s) Oral every 8 hours  montelukast 10 milliGRAM(s) Oral daily  pantoprazole  Injectable 40 milliGRAM(s) IV Push every 12 hours  potassium chloride   Powder 40 milliEquivalent(s) Oral once  potassium chloride   Powder 40 milliEquivalent(s) Oral once  sacubitril 24 mG/valsartan 26 mG 1 Tablet(s) Oral two times a day  tamsulosin 0.4 milliGRAM(s) Oral at bedtime    Vital Signs Last 24 Hrs  T(F): 98.6 (18 May 2023 15:36), Max: 98.6 (18 May 2023 15:36)  HR: 80 (18 May 2023 15:36) (50 - 80)  BP: 87/50 (18 May 2023 15:36) (84/47 - 101/57)  RR: 19 (18 May 2023 15:36) (19 - 19)  SpO2: 98% (18 May 2023 15:36) (98% - 98%)  I&O's Summary    17 May 2023 07:01  -  18 May 2023 07:00  --------------------------------------------------------  IN: 0 mL / OUT: 300 mL / NET: -300 mL    18 May 2023 07:01  -  18 May 2023 18:20  --------------------------------------------------------  IN: 840 mL / OUT: 600 mL / NET: 240 mL        PHYSICAL EXAM:  General: NAD, A/O x 3  ENT: MMM  Neck: Supple, No JVD  Lungs: diminished air entry bilaterally, no wheezing heard   Cardio: RRR, S1/S2, 2/6 sysotlic murmur   Abdomen: Soft, Nontender, Nondistended; Bowel sounds present  Extremities: No cyanosis, trace LE edema    LABS:                        8.6    5.24  )-----------( 229      ( 18 May 2023 08:36 )             28.7     05-18    142  |  104  |  13  ----------------------------<  105  3.1   |  28  |  1.0    Ca    7.9      18 May 2023 08:36  Mg     2.0     05-18    TPro  5.7  /  Alb  3.7  /  TBili  1.1  /  DBili  x   /  AST  18  /  ALT  12  /  AlkPhos  61  05-18      PTT - ( 17 May 2023 17:17 )  PTT:31.9 sec                      POCT Blood Glucose.: 90 mg/dL (18 May 2023 16:30)  POCT Blood Glucose.: 120 mg/dL (18 May 2023 11:31)  POCT Blood Glucose.: 118 mg/dL (18 May 2023 07:57)  POCT Blood Glucose.: 98 mg/dL (17 May 2023 21:33)              RADIOLOGY & ADDITIONAL TESTS:    Care Discussed with Consultants/Other Providers:

## 2023-05-18 NOTE — PROVIDER CONTACT NOTE (OTHER) - ASSESSMENT
BP 89/48, HR 54. Pt asymptomatic at this time, son present at bedside translating. Pt denies dizziness or SOB

## 2023-05-18 NOTE — PROGRESS NOTE ADULT - SUBJECTIVE AND OBJECTIVE BOX
24H events:    Patient is a 78y old Male who presents with a chief complaint of upper GI bleed (17 May 2023 11:38)    Primary diagnosis of Upper GI bleed       Today is hospital day 6d.      Patient seen and examined at bedside. Reports no active complaints.     PAST MEDICAL & SURGICAL HISTORY  Hypertension    CAD (coronary artery disease)    S/P CABG x 1    Unspecified atrial fibrillation    Chronic systolic congestive heart failure    Asthma      SOCIAL HISTORY:  Negative for smoking/alcohol/drug use.     ALLERGIES:  No Known Allergies    MEDICATIONS:  STANDING MEDICATIONS  atorvastatin 20 milliGRAM(s) Oral at bedtime  chlorhexidine 2% Cloths 1 Application(s) Topical daily  enoxaparin Injectable 100 milliGRAM(s) SubCutaneous every 12 hours  ferrous    sulfate 325 milliGRAM(s) Oral daily  furosemide    Tablet 20 milliGRAM(s) Oral daily  metoprolol tartrate 50 milliGRAM(s) Oral every 8 hours  montelukast 10 milliGRAM(s) Oral daily  pantoprazole  Injectable 40 milliGRAM(s) IV Push every 12 hours  potassium chloride   Powder 40 milliEquivalent(s) Oral once  potassium chloride   Powder 40 milliEquivalent(s) Oral once  sacubitril 24 mG/valsartan 26 mG 1 Tablet(s) Oral two times a day  tamsulosin 0.4 milliGRAM(s) Oral at bedtime    PRN MEDICATIONS  albuterol/ipratropium for Nebulization 3 milliLiter(s) Nebulizer every 6 hours PRN    VITALS:   T(F): 97.7  HR: 61  BP: 85/46  RR: 19  SpO2: 98%    LABS:                        8.6    5.24  )-----------( 229      ( 18 May 2023 08:36 )             28.7     05-18    142  |  104  |  13  ----------------------------<  105<H>  3.1<L>   |  28  |  1.0    Ca    7.9<L>      18 May 2023 08:36  Mg     2.0     05-18    TPro  5.7<L>  /  Alb  3.7  /  TBili  1.1  /  DBili  x   /  AST  18  /  ALT  12  /  AlkPhos  61  05-18    PTT - ( 17 May 2023 17:17 )  PTT:31.9 sec              RADIOLOGY:    PHYSICAL EXAM:  GENERAL:  NAD  SKIN: No rashes or lesions  HEENT: Atraumatic. Normocephalic.   NECK: Supple, No JVD.   PULMONARY: decreased breath sounds B/L. No wheezing.   CVS: Normal S1, S2. Rate and Rhythm are regular.   ABDOMEN/GI: Soft, Nontender, Nondistended   MSK:  No clubbing or cyanosis   NEUROLOGIC:  Moves all extremities   PSYCH: Awake and alert

## 2023-05-18 NOTE — PROGRESS NOTE ADULT - ASSESSMENT
H&P:  77yo M hx of afib on eliquis, HFrEF (unknown EF), CAD s/p CABG (8 years ago), HTN, asthma presented to the hospital for syncopal episode after feeling dizzy yesterday evening.  Patient speaks a dialect similar to Cantonese but is a village dialect. Patient is able to understand Cantonese, but 100%  will not be able to understand the patient's language. I understood 100% of patient's conversation.   Patient was feeling dizzy while walking to the bathroom at 10PM  followed by a fall. +LOC, + AC. Unsure if he had head trauma, but does not experience any headaches. Unwitnessed by family. Denies fevers, shortness of breath, chest pain, abdominal pain, diarrhea, constipation, dysuria.   Upon arrival to the ED, patient was complaining of having productive sputum needing to cough, epistaxis, and followed by ~50cc of hematemesis. Brought into hospital via EMS  At the ED, trauma alert was activated due to the fall. Dried blood found in the nares. VS was T-97.8F, BP: 97/54, HR: 71bpm, RR: 17bpm, SpO2: 96% on RA. After episode of hemoptysis, patient's SBP dropped from 89-->85. 2u prbc were ordered resulting in improvement of BP. Labs significant for microcytic anemia (Hb- 7.3), BUN- 33. troponin<0.01 x1.   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  CTA Abd+Pelvis: no acute pathology  Xray Pelvis: no acute fracture  Patient admitted to ICU for Upper GI bleed. Currently, VS stable: with SBP 110s. Given 2u prbc. started octrotide and protonix infusion. s/p ceftriaxone and erythromycin IV. given K centra 5g. Vascular surgery consulted for aortic dissection. No acute surgical intervention, Recommend keep -140 SBP.  per ED signout. GI consutled for GI Bleed. Plan for colonoscopy in today.       ICU COURSE: Cardiology, CT surgery, and Vascular Surgery evaluated patient for aortic dissection, stated no acute surgical intervention, and consult GI for GIB. Pt received 2u RBC in ED. No further active bleeding since ED presentation. GI following, initially plan was for EGD, but given high cardiac risk, will not do EGD until patient is more stable given no active bleeding.  Patient started on esmolol drip to keep HR <70 and SBP <140 for aortic dissection (read as acute on CTA chest, but may be chronic). Started lopressor 25mg q8h, restarted home entresto, and started lasix 40mg qd (on 20mg qd at home). Since 5/16 early afternoon pt's HR and BP have been controlled off esmolol drip. Repeat CBC stable. Downgrade to stepdown approved by Dr. Florentino Mathews.     CEU course     patient remained stable in ceu off esmolol drip and hemoglobin stable- now dgtf         #Suspected Upper GI Bleed  #Hematemesis   #Acute blood loss anemia - resolved s/p 2u RBC  - pt presented with hematemesis, epistaxis, and fall associated with hypotension  - Hgb 7.3 received 2u RBC, repeat Hgb stable  - no further active bleeding since ED presentation  - GI following -- no EGD until more stable given no active bleed   - IV PPI BID  - CBC q12h, transfuse for Hgb < 7.0  - ferrous sulfate 325mg qd     #Unwitnessed fall  - trauma workup CTH, CT spine, CT A/P, XR pelvis negative   - 5/12 echo: EF 68%, mild-mod AR, mild MR, mild TR, mild pHTN  - fall precautions    #Acute type B aortic dissection (very short segment)  - CTA chest aorta w/wo IV contrast: very short segment acute type B aortic dissection 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 extending few cm from aortic arch, no evidence of acute traumatic injury, arterial extravasation  - trop negative   - Vascular surgery consulted: strict BP control, no acute surgery, hold all AC for GIB  - CT Surgery consulted, rec BP control, fluid resuscitation for hypovolemia; keep HR < 80  - Cardio consulted: start esmolol drip goal HR 50s, add nicardipine if remains hypertensive, hold AC  - keep SBP < 120, HR < 60  - off esmolol drip  - started lopressor 25mg q8h     #H/o HFrEF (unknown EF) with now recovered EF  #CAD s/p CABG   - at home is on lasix 20mg qd entresto 24-26mg qd, eplerenone 25mg qd  - restart entresto  - increased lasix to 40mg qd   - no fluids  - repeat 5/12 echo: EF 68%, mild-mod AR, mild MR, mild TR, mild pHTN  - statin    #Paroxysmal afib on eliquis  - not on any rate control at home  - start lopressor 50mg q8h  - esmolol drip to keep HR < 60 for aortic dissection -- wean  - hold AC for GIB  - duplex negative for DVT    #Hx of asthma  - not in exacerbation  - SpO2 98% on RA, no wheezing   - c/w home montelukast 10mg qd     Misc  - DVT ppx: SCDs  - GI ppx: IV PPI BID  - Diet:  low fiber soft bite sized   - Activity: IAT  - Code status: FULL.                   H&P:  79yo M hx of afib on eliquis, HFrEF (unknown EF), CAD s/p CABG (8 years ago), HTN, asthma presented to the hospital for syncopal episode after feeling dizzy yesterday evening.  Patient speaks a dialect similar to [place]onese but is a village dialect. Patient is able to understand Cantonese, but 100%  will not be able to understand the patient's language. I understood 100% of patient's conversation.   Patient was feeling dizzy while walking to the bathroom at 10PM  followed by a fall. +LOC, + AC. Unsure if he had head trauma, but does not experience any headaches. Unwitnessed by family. Denies fevers, shortness of breath, chest pain, abdominal pain, diarrhea, constipation, dysuria.   Upon arrival to the ED, patient was complaining of having productive sputum needing to cough, epistaxis, and followed by ~50cc of hematemesis. Brought into hospital via EMS  At the ED, trauma alert was activated due to the fall. Dried blood found in the nares. VS was T-97.8F, BP: 97/54, HR: 71bpm, RR: 17bpm, SpO2: 96% on RA. After episode of hemoptysis, patient's SBP dropped from 89-->85. 2u prbc were ordered resulting in improvement of BP. Labs significant for microcytic anemia (Hb- 7.3), BUN- 33. troponin<0.01 x1.   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  CTA Abd+Pelvis: no acute pathology  Xray Pelvis: no acute fracture  Patient admitted to ICU for Upper GI bleed. Currently, VS stable: with SBP 110s. Given 2u prbc. started octrotide and protonix infusion. s/p ceftriaxone and erythromycin IV. given K centra 5g. Vascular surgery consulted for aortic dissection. No acute surgical intervention, Recommend keep -140 SBP.  per ED signout. GI consutled for GI Bleed. Plan for colonoscopy in today.       ICU COURSE:   Cardiology, CT surgery, and Vascular Surgery evaluated patient for aortic dissection, stated no acute surgical intervention, and consult GI for GIB. Pt received 2u RBC in ED. No further active bleeding since ED presentation. GI following, initially plan was for EGD, but given high cardiac risk, will not do EGD until patient is more stable given no active bleeding.  Patient started on esmolol drip to keep HR <70 and SBP <140 for aortic dissection (read as acute on CTA chest, but may be chronic). Started lopressor 25mg q8h, restarted home entresto, and started lasix 40mg qd (on 20mg qd at home). Since 5/16 early afternoon pt's HR and BP have been controlled off esmolol drip. Repeat CBC stable. Downgrade to stepdown approved by Dr. Florentino Mathews.     CEU course   patient remained stable in ceu off esmolol drip and hemoglobin stable- now dgtf       #Suspected Upper GI Bleed  #Hematemesis   #Acute blood loss anemia - resolved s/p 2u RBC  - pt presented with hematemesis, epistaxis, and fall associated with hypotension  - Hgb 7.3 received 2u RBC, repeat Hgb stable  - no further active bleeding since ED presentation  - GI following -- no EGD   - IV PPI BID  - CBC q12h, transfuse for Hgb < 7.0  - ferrous sulfate 325mg qd     #Unwitnessed fall  - trauma workup CTH, CT spine, CT A/P, XR pelvis negative   - 5/12 echo: EF 68%, mild-mod AR, mild MR, mild TR, mild pHTN  - fall precautions    #Acute type B aortic dissection (very short segment)  - CTA chest aorta w/wo IV contrast: very short segment acute type B aortic dissection 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 extending few cm from aortic arch, no evidence of acute traumatic injury, arterial extravasation  - trop negative   - Vascular surgery consulted: strict BP control, no acute surgery, hold all AC for GIB  - CT Surgery consulted, rec BP control, fluid resuscitation for hypovolemia; keep HR < 80  - Cardio consulted: start esmolol drip goal HR 50s, add nicardipine if remains hypertensive, hold AC  - keep SBP < 120, HR < 60  - off esmolol drip  - started lopressor 25mg q8h     #H/o HFrEF (unknown EF) with now recovered EF  #CAD s/p CABG   - at home is on lasix 20mg qd entresto 24-26mg qd, eplerenone 25mg qd  - restart entresto  - increased lasix to 40mg qd   - no fluids  - repeat 5/12 echo: EF 68%, mild-mod AR, mild MR, mild TR, mild pHTN  - statin    #Paroxysmal afib on eliquis  - not on any rate control at home  - start lopressor 50mg q8h  - esmolol drip to keep HR < 60 for aortic dissection -- wean  - hold AC for GIB  - duplex negative for DVT    #Hx of asthma  - not in exacerbation  - SpO2 98% on RA, no wheezing   - c/w home montelukast 10mg qd     Misc  - DVT ppx: SCDs  - GI ppx: IV PPI BID  - Diet:  low fiber soft bite sized   - Activity: IAT  - Code status: FULL.

## 2023-05-19 LAB
ALBUMIN SERPL ELPH-MCNC: 3.4 G/DL — LOW (ref 3.5–5.2)
ALP SERPL-CCNC: 56 U/L — SIGNIFICANT CHANGE UP (ref 30–115)
ALT FLD-CCNC: 9 U/L — SIGNIFICANT CHANGE UP (ref 0–41)
ANION GAP SERPL CALC-SCNC: 8 MMOL/L — SIGNIFICANT CHANGE UP (ref 7–14)
AST SERPL-CCNC: 12 U/L — SIGNIFICANT CHANGE UP (ref 0–41)
BASOPHILS # BLD AUTO: 0.04 K/UL — SIGNIFICANT CHANGE UP (ref 0–0.2)
BASOPHILS NFR BLD AUTO: 0.9 % — SIGNIFICANT CHANGE UP (ref 0–1)
BILIRUB SERPL-MCNC: 1.2 MG/DL — SIGNIFICANT CHANGE UP (ref 0.2–1.2)
BUN SERPL-MCNC: 13 MG/DL — SIGNIFICANT CHANGE UP (ref 10–20)
CALCIUM SERPL-MCNC: 7.7 MG/DL — LOW (ref 8.4–10.4)
CHLORIDE SERPL-SCNC: 104 MMOL/L — SIGNIFICANT CHANGE UP (ref 98–110)
CO2 SERPL-SCNC: 29 MMOL/L — SIGNIFICANT CHANGE UP (ref 17–32)
CREAT SERPL-MCNC: 1 MG/DL — SIGNIFICANT CHANGE UP (ref 0.7–1.5)
EGFR: 78 ML/MIN/1.73M2 — SIGNIFICANT CHANGE UP
EOSINOPHIL # BLD AUTO: 0.18 K/UL — SIGNIFICANT CHANGE UP (ref 0–0.7)
EOSINOPHIL NFR BLD AUTO: 3.8 % — SIGNIFICANT CHANGE UP (ref 0–8)
GLUCOSE SERPL-MCNC: 82 MG/DL — SIGNIFICANT CHANGE UP (ref 70–99)
HCT VFR BLD CALC: 27.5 % — LOW (ref 42–52)
HGB BLD-MCNC: 8.2 G/DL — LOW (ref 14–18)
IMM GRANULOCYTES NFR BLD AUTO: 0.2 % — SIGNIFICANT CHANGE UP (ref 0.1–0.3)
LYMPHOCYTES # BLD AUTO: 0.89 K/UL — LOW (ref 1.2–3.4)
LYMPHOCYTES # BLD AUTO: 19 % — LOW (ref 20.5–51.1)
MAGNESIUM SERPL-MCNC: 2 MG/DL — SIGNIFICANT CHANGE UP (ref 1.8–2.4)
MCHC RBC-ENTMCNC: 22.4 PG — LOW (ref 27–31)
MCHC RBC-ENTMCNC: 29.8 G/DL — LOW (ref 32–37)
MCV RBC AUTO: 75.1 FL — LOW (ref 80–94)
MONOCYTES # BLD AUTO: 0.57 K/UL — SIGNIFICANT CHANGE UP (ref 0.1–0.6)
MONOCYTES NFR BLD AUTO: 12.2 % — HIGH (ref 1.7–9.3)
NEUTROPHILS # BLD AUTO: 3 K/UL — SIGNIFICANT CHANGE UP (ref 1.4–6.5)
NEUTROPHILS NFR BLD AUTO: 63.9 % — SIGNIFICANT CHANGE UP (ref 42.2–75.2)
NRBC # BLD: 0 /100 WBCS — SIGNIFICANT CHANGE UP (ref 0–0)
PLATELET # BLD AUTO: 208 K/UL — SIGNIFICANT CHANGE UP (ref 130–400)
PMV BLD: 9.7 FL — SIGNIFICANT CHANGE UP (ref 7.4–10.4)
POTASSIUM SERPL-MCNC: 3 MMOL/L — LOW (ref 3.5–5)
POTASSIUM SERPL-SCNC: 3 MMOL/L — LOW (ref 3.5–5)
PROT SERPL-MCNC: 5.4 G/DL — LOW (ref 6–8)
RBC # BLD: 3.66 M/UL — LOW (ref 4.7–6.1)
RBC # FLD: 23.9 % — HIGH (ref 11.5–14.5)
SODIUM SERPL-SCNC: 141 MMOL/L — SIGNIFICANT CHANGE UP (ref 135–146)
WBC # BLD: 4.69 K/UL — LOW (ref 4.8–10.8)
WBC # FLD AUTO: 4.69 K/UL — LOW (ref 4.8–10.8)

## 2023-05-19 PROCEDURE — 99232 SBSQ HOSP IP/OBS MODERATE 35: CPT

## 2023-05-19 RX ORDER — FUROSEMIDE 40 MG
20 TABLET ORAL DAILY
Refills: 0 | Status: DISCONTINUED | OUTPATIENT
Start: 2023-05-19 | End: 2023-05-20

## 2023-05-19 RX ORDER — METOPROLOL TARTRATE 50 MG
1 TABLET ORAL
Qty: 0 | Refills: 0 | DISCHARGE
Start: 2023-05-19

## 2023-05-19 RX ORDER — METOPROLOL TARTRATE 50 MG
1 TABLET ORAL
Qty: 84 | Refills: 0
Start: 2023-05-19 | End: 2023-06-15

## 2023-05-19 RX ORDER — METOPROLOL TARTRATE 50 MG
25 TABLET ORAL THREE TIMES A DAY
Refills: 0 | Status: DISCONTINUED | OUTPATIENT
Start: 2023-05-19 | End: 2023-05-20

## 2023-05-19 RX ADMIN — ENOXAPARIN SODIUM 100 MILLIGRAM(S): 100 INJECTION SUBCUTANEOUS at 17:56

## 2023-05-19 RX ADMIN — ENOXAPARIN SODIUM 100 MILLIGRAM(S): 100 INJECTION SUBCUTANEOUS at 05:05

## 2023-05-19 RX ADMIN — TAMSULOSIN HYDROCHLORIDE 0.4 MILLIGRAM(S): 0.4 CAPSULE ORAL at 21:52

## 2023-05-19 RX ADMIN — ATORVASTATIN CALCIUM 20 MILLIGRAM(S): 80 TABLET, FILM COATED ORAL at 21:06

## 2023-05-19 RX ADMIN — MONTELUKAST 10 MILLIGRAM(S): 4 TABLET, CHEWABLE ORAL at 11:36

## 2023-05-19 RX ADMIN — Medication 25 MILLIGRAM(S): at 14:00

## 2023-05-19 RX ADMIN — Medication 325 MILLIGRAM(S): at 11:36

## 2023-05-19 RX ADMIN — Medication 25 MILLIGRAM(S): at 21:06

## 2023-05-19 RX ADMIN — SACUBITRIL AND VALSARTAN 1 TABLET(S): 24; 26 TABLET, FILM COATED ORAL at 05:05

## 2023-05-19 RX ADMIN — Medication 50 MILLIGRAM(S): at 05:04

## 2023-05-19 RX ADMIN — CHLORHEXIDINE GLUCONATE 1 APPLICATION(S): 213 SOLUTION TOPICAL at 17:29

## 2023-05-19 RX ADMIN — PANTOPRAZOLE SODIUM 40 MILLIGRAM(S): 20 TABLET, DELAYED RELEASE ORAL at 17:56

## 2023-05-19 RX ADMIN — SACUBITRIL AND VALSARTAN 1 TABLET(S): 24; 26 TABLET, FILM COATED ORAL at 17:55

## 2023-05-19 RX ADMIN — PANTOPRAZOLE SODIUM 40 MILLIGRAM(S): 20 TABLET, DELAYED RELEASE ORAL at 05:05

## 2023-05-19 NOTE — PHYSICAL THERAPY INITIAL EVALUATION ADULT - ADDITIONAL COMMENTS
Patient lives with spouse and family. Has 12 steps to enter home. Was independent in ADLs and ambulation with supervision for safety.

## 2023-05-19 NOTE — PROGRESS NOTE ADULT - ASSESSMENT
H&P:  79yo M hx of afib on eliquis, HFrEF (unknown EF), CAD s/p CABG (8 years ago), HTN, asthma presented to the hospital for syncopal episode after feeling dizzy yesterday evening.  Patient speaks a dialect similar to Cantonese but is a village dialect. Patient is able to understand Cantonese, but 100%  will not be able to understand the patient's language. I understood 100% of patient's conversation.   Patient was feeling dizzy while walking to the bathroom at 10PM  followed by a fall. +LOC, + AC. Unsure if he had head trauma, but does not experience any headaches. Unwitnessed by family. Denies fevers, shortness of breath, chest pain, abdominal pain, diarrhea, constipation, dysuria.   Upon arrival to the ED, patient was complaining of having productive sputum needing to cough, epistaxis, and followed by ~50cc of hematemesis. Brought into hospital via EMS  At the ED, trauma alert was activated due to the fall. Dried blood found in the nares. VS was T-97.8F, BP: 97/54, HR: 71bpm, RR: 17bpm, SpO2: 96% on RA. After episode of hemoptysis, patient's SBP dropped from 89-->85. 2u prbc were ordered resulting in improvement of BP. Labs significant for microcytic anemia (Hb- 7.3), BUN- 33. troponin<0.01 x1.   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  CTA Abd+Pelvis: no acute pathology  Xray Pelvis: no acute fracture  Patient admitted to ICU for Upper GI bleed. Currently, VS stable: with SBP 110s. Given 2u prbc. started octreotide and Protonix infusion. s/p ceftriaxone and erythromycin IV. given K centra 5g. Vascular surgery consulted for aortic dissection. No acute surgical intervention, Recommend keep -140 SBP.  per ED signout. GI consutled for GI Bleed.     ICU COURSE:   Cardiology, CT surgery, and Vascular Surgery evaluated patient for aortic dissection, stated no acute surgical intervention, and consult GI for GIB. Pt received 2u RBC in ED. No further active bleeding since ED presentation. GI following, initially plan was for EGD, but given high cardiac risk, will not do EGD until patient is more stable given no active bleeding.  Patient started on esmolol drip to keep HR <70 and SBP <140 for aortic dissection (read as acute on CTA chest, but may be chronic). Started lopressor 25mg q8h, restarted home entresto, and started lasix 40mg qd (on 20mg qd at home). Since 5/16 early afternoon pt's HR and BP have been controlled off esmolol drip. Repeat CBC stable. Downgrade to stepdown approved by Dr. Florentino Mathews.     CEU course   patient remained stable in ceu off esmolol drip and hemoglobin stable- now dgtf 5/17      #Suspected Upper GI Bleed  #Hematemesis   #Acute blood loss anemia - resolved s/p 2u RBC  - pt presented with hematemesis, epistaxis, and fall associated with hypotension  - Hgb 7.3 received 2u RBC, repeat Hgb stable  - no further active bleeding since ED presentation  - GI following -- no EGD, now it is planned as an outpatient   - IV PPI BID--> switch to PO   - CBC q12h, transfuse for Hgb < 7.0  - ferrous sulfate 325mg qd     #Unwitnessed fall  - trauma workup CTH, CT spine, CT A/P, XR pelvis negative   - 5/12 echo: EF 68%, mild-mod AR, mild MR, mild TR, mild pHTN  - fall precautions    #Acute type B aortic dissection (very short segment)  - CTA chest aorta w/wo IV contrast: very short segment acute type B aortic dissection 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 extending few cm from aortic arch, no evidence of acute traumatic injury, arterial extravasation  - trop negative   - Vascular surgery consulted: strict BP control, no acute surgery, hold all AC for GIB  - CT Surgery consulted, rec BP control, fluid resuscitation for hypovolemia; keep HR < 80  - Cardio consulted: start esmolol drip goal HR 50s, add nicardipine if remains hypertensive, hold AC  - keep SBP < 120, HR < 60  - off esmolol drip  - continue lopressor 25mg q8h     #H/o HFrEF (unknown EF) with now recovered EF  #CAD s/p CABG   - at home is on lasix 20mg qd entresto 24-26mg qd, eplerenone 25mg qd  - continue entresto  - 5/18 stopped lasix due to hypotension, continue to evaluate    - 5/19 reduced metoprolol to 25mg tid due to hypotension   - no fluids  - repeat 5/12 echo: EF 68%, mild-mod AR, mild MR, mild TR, mild pHTN  - statin    #Paroxysmal afib on eliquis  - not on any rate control at home  - continue lopressor 50mg q8h  - esmolol drip to keep HR < 60 for aortic dissection -- wean  - hold AC for GIB  - duplex negative for DVT    #Hx of asthma  - not in exacerbation  - SpO2 98% on RA, no wheezing   - c/w home montelukast 10mg qd   - incentive spirometer ordered  - pt states he uses oxygen at home     Misc  - DVT ppx: SCDs  - GI ppx: IV PPI BID  - Diet:  low fiber soft bite sized   - Activity: IAT  - Code status: FULL.  - Pending: BP reassessment   - Family: grandson updated at bedside 5/19

## 2023-05-19 NOTE — PROGRESS NOTE ADULT - ATTENDING COMMENTS
IMPRESSION:    Syncope on presentation, resolved with no further episode   Type B Aortic Dissection SP CTS eval   Possible UGIB, Hgb stable SP 2 units PRBC  HO HFpEF   HO CAD s/p CABG  HO Chronic AFIB was on Eliquis    Plan as outlined above
Attending Statement: I have personally performed a face to face diagnostic evaluation on this patient. The patient is suffering from:  Syncope  Type B aortic dissection  Rule out UGI bleed  I have made amendments to the documentation where necessary. I have personally seen and examined this patient.  I have fully participated in the care of this patient.  I have reviewed all pertinent clinical information, including history, physical exam, plan and note.
Note reviewed and edited as needed
No further overt bleeding and suspect  may have been secondary to reported epistaxis. Would defer EGD at this time considering risks/benefits and ongoing mgmt for aortic dissection. If has recurrent overt GI bleeding with drop in Hb, please notify GI.

## 2023-05-19 NOTE — PROGRESS NOTE ADULT - SUBJECTIVE AND OBJECTIVE BOX
Patient is a 78y old  Male who presents with a chief complaint of upper GI bleed (19 May 2023 14:08)      Patient seen and examined at bedside.  Patient denies any chest pain or shortness of breath   ALLERGIES:  No Known Allergies    MEDICATIONS:  albuterol/ipratropium for Nebulization 3 milliLiter(s) Nebulizer every 6 hours PRN  atorvastatin 20 milliGRAM(s) Oral at bedtime  chlorhexidine 2% Cloths 1 Application(s) Topical daily  enoxaparin Injectable 100 milliGRAM(s) SubCutaneous every 12 hours  ferrous    sulfate 325 milliGRAM(s) Oral daily  furosemide    Tablet 20 milliGRAM(s) Oral daily  lactated ringers Bolus 500 milliLiter(s) IV Bolus once  metoprolol tartrate 25 milliGRAM(s) Oral three times a day  montelukast 10 milliGRAM(s) Oral daily  pantoprazole   Suspension 40 milliGRAM(s) Oral two times a day  potassium chloride   Powder 40 milliEquivalent(s) Oral once  potassium chloride   Powder 40 milliEquivalent(s) Oral once  sacubitril 24 mG/valsartan 26 mG 1 Tablet(s) Oral two times a day  tamsulosin 0.4 milliGRAM(s) Oral at bedtime    Vital Signs Last 24 Hrs  T(F): 98.5 (19 May 2023 16:14), Max: 98.5 (19 May 2023 16:14)  HR: 64 (19 May 2023 16:14) (42 - 74)  BP: 89/54 (19 May 2023 16:14) (72/41 - 118/63)  RR: 18 (19 May 2023 16:14) (18 - 18)  SpO2: 96% (19 May 2023 16:14) (96% - 100%)  I&O's Summary    18 May 2023 07:01  -  19 May 2023 07:00  --------------------------------------------------------  IN: 890 mL / OUT: 1250 mL / NET: -360 mL        PHYSICAL EXAM:  General: NAD, A/O x 3  ENT: MMM  Neck: Supple, No JVD  Lungs: diminished, no wheezing or crackles   Cardio: RRR, S1/S2, No murmurs  Abdomen: Soft, Nontender, Nondistended; Bowel sounds present  Extremities: No cyanosis, trace LE edema    LABS:                        8.2    4.69  )-----------( 208      ( 19 May 2023 07:18 )             27.5     05-19    141  |  104  |  13  ----------------------------<  82  3.0   |  29  |  1.0    Ca    7.7      19 May 2023 07:18  Mg     2.0     05-19    TPro  5.4  /  Alb  3.4  /  TBili  1.2  /  DBili  x   /  AST  12  /  ALT  9   /  AlkPhos  56  05-19      PTT - ( 17 May 2023 17:17 )  PTT:31.9 sec                      POCT Blood Glucose.: 98 mg/dL (18 May 2023 21:02)              RADIOLOGY & ADDITIONAL TESTS:    Care Discussed with Consultants/Other Providers:

## 2023-05-19 NOTE — PHYSICAL THERAPY INITIAL EVALUATION ADULT - PERTINENT HX OF CURRENT PROBLEM, REHAB EVAL
77yo M hx of afib on eliquis, HFrEF (unknown EF), CAD s/p CABG (8 years ago), HTN, asthma presented to the hospital for syncopal episode after feeling dizzy yesterday evening.  Patient was feeling dizzy while walking to the bathroom at 10PM  followed by a fall. +LOC, + AC. Unsure if he had head trauma, but does not experience any headaches. Unwitnessed by family. Denies fevers, shortness of breath, chest pain, abdominal pain, diarrhea, constipation, dysuria.   Upon arrival to the ED, patient was complaining of having productive sputum needing to cough, epistaxis, and followed by ~50cc of hematemesis. Brought into hospital via EMS  At the ED, trauma alert was activated due to the fall. Dried blood found in the nares. VS was T-97.8F, BP: 97/54, HR: 71bpm, RR: 17bpm, SpO2: 96% on RA. After episode of hemoptysis, patient's SBP dropped from 89-->85. 2u prbc were ordered resulting in improvement of BP. Labs significant for microcytic anemia (Hb- 7.3), BUN- 33. troponin<0.01 x1.

## 2023-05-19 NOTE — PROGRESS NOTE ADULT - SUBJECTIVE AND OBJECTIVE BOX
24H events:    Patient is a 78y old Male who presents with a chief complaint of upper GI bleed (19 May 2023 11:02)    Primary diagnosis of Syncope       Today is hospital day 7d.      Patient seen and examined at bedside. Reports no active complaints.     PAST MEDICAL & SURGICAL HISTORY  Hypertension    CAD (coronary artery disease)    S/P CABG x 1    Unspecified atrial fibrillation    Chronic systolic congestive heart failure    Asthma      SOCIAL HISTORY:  Negative for smoking/alcohol/drug use.     ALLERGIES:  No Known Allergies    MEDICATIONS:  STANDING MEDICATIONS  atorvastatin 20 milliGRAM(s) Oral at bedtime  chlorhexidine 2% Cloths 1 Application(s) Topical daily  enoxaparin Injectable 100 milliGRAM(s) SubCutaneous every 12 hours  ferrous    sulfate 325 milliGRAM(s) Oral daily  furosemide    Tablet 20 milliGRAM(s) Oral daily  lactated ringers Bolus 500 milliLiter(s) IV Bolus once  metoprolol tartrate 50 milliGRAM(s) Oral every 8 hours  montelukast 10 milliGRAM(s) Oral daily  pantoprazole   Suspension 40 milliGRAM(s) Oral two times a day  potassium chloride   Powder 40 milliEquivalent(s) Oral once  potassium chloride   Powder 40 milliEquivalent(s) Oral once  sacubitril 24 mG/valsartan 26 mG 1 Tablet(s) Oral two times a day  tamsulosin 0.4 milliGRAM(s) Oral at bedtime    PRN MEDICATIONS  albuterol/ipratropium for Nebulization 3 milliLiter(s) Nebulizer every 6 hours PRN    VITALS:   T(F): 97.9  HR: 58  BP: 94/53  RR: 18  SpO2: 100%    LABS:                        8.2    4.69  )-----------( 208      ( 19 May 2023 07:18 )             27.5     05-19    141  |  104  |  13  ----------------------------<  82  3.0<L>   |  29  |  1.0    Ca    7.7<L>      19 May 2023 07:18  Mg     2.0     05-19    TPro  5.4<L>  /  Alb  3.4<L>  /  TBili  1.2  /  DBili  x   /  AST  12  /  ALT  9   /  AlkPhos  56  05-19    PTT - ( 17 May 2023 17:17 )  PTT:31.9 sec              RADIOLOGY:    PHYSICAL EXAM:  GENERAL:  NAD  SKIN: No rashes or lesions  HEENT: Atraumatic. Normocephalic.   NECK: Supple, No JVD.   PULMONARY: decreased breath sounds B/L. No wheezing.   CVS: Normal S1, S2. Rate and Rhythm are regular.   ABDOMEN/GI: Soft, Nontender, Nondistended   MSK:  No clubbing or cyanosis   NEUROLOGIC:  Moves all extremities   PSYCH: Awake and alert

## 2023-05-19 NOTE — DISCHARGE NOTE PROVIDER - CARE PROVIDERS DIRECT ADDRESSES
[email or web address]

## 2023-05-19 NOTE — DISCHARGE NOTE PROVIDER - NSDCCPCAREPLAN_GEN_ALL_CORE_FT
PRINCIPAL DISCHARGE DIAGNOSIS  Diagnosis: Syncope  Assessment and Plan of Treatment: you were admitted and managed here for syncope.  Syncope is when you temporarily lose consciousness, also called fainting or passing out. It is caused by a sudden decrease in blood flow to the brain. Even though most causes of syncope are not dangerous, syncope can possibly be a sign of a serious medical problem. Signs that you may be about to faint include feeling dizzy, lightheaded, nausea, visual changes, or cold/clammy skin. Do not drive, operate heavy machinery, or play sports until your health care provider says it is okay.  SEEK IMMEDIATE MEDICAL CARE IF YOU HAVE ANY OF THE FOLLOWING SYMPTOMS: severe headache, pain in your chest/abdomen/back, bleeding from your mouth or rectum, palpitations, shortness of breath, pain with breathing, seizure, confusion, or trouble walking.      SECONDARY DISCHARGE DIAGNOSES  Diagnosis: Fall  Assessment and Plan of Treatment: you were also admitted for fall workup and management.   Fall prevention includes ways to make your home and other areas safer. It also includes ways you can move more carefully to prevent a fall. Health conditions that cause changes in your blood pressure, vision, or muscle strength and coordination may increase your risk for falls. Medicines may also increase your risk for falls if they make you dizzy, weak, or sleepy.  Seek Medical Attention If:  You have fallen and are unconscious.  fallen and cannot move part of your body.  You have fallen and have pain or a headache.  Fall prevention tips:  Stand or sit up slowly.  Use assistive devices as directed.   You may need to have grab bars put in your bathroom near the toilet or in the shower.  Wear shoes that fit well and have soles that . Wear shoes both inside and outside. Do not wear shoes with high heels.  Wear a personal alarm that can call 911 in an emergency.   Manage your medical conditions. Keep all appointments with your healthcare providers. Visit your eye doctor as directed.  Home safety tips:   Put nonslip strips on your bath or shower floor to prevent you from slipping.  Use a shower seat so you do not need to stand while you shower. Sit on the toilet or a chair in your bathroom to dry yourself and put on clothing. This will prevent you from losing your balance from drying or dressing yourself while you are standing.  Keep paths clear. Remove books, shoes, and other objects from walkways and stairs. Place cords for telephones and lamps out of the way so that you do not need to walk over them. Tape them down if you cannot move them. Remove small rugs or secure it with double-sided tape to prevent you from tripping.  Install bright lights in your home. Use night lights to help light paths to the bathroom or kitchen. Always turn on the light before you start walking.  Keep items you use often on shelves within reach. Do not use a step stool to help you reach an item.  Place reflective tape on the edges of you

## 2023-05-19 NOTE — DISCHARGE NOTE PROVIDER - PROVIDER TOKENS
PROVIDER:[TOKEN:[[id number]],FOLLOWUP:[2 weeks]],PROVIDER:[TOKEN:[[id number]],FOLLOWUP:[2 weeks]] PROVIDER:[TOKEN:[[id number]],FOLLOWUP:[2 weeks]],PROVIDER:[TOKEN:[[id number]],FOLLOWUP:[2 weeks]],PROVIDER:[TOKEN:[[id number]],FOLLOWUP:[2 weeks]]

## 2023-05-19 NOTE — DISCHARGE NOTE PROVIDER - CARE PROVIDER_API CALL
Rahul Queen)  Geriatric Medicine; Internal Medicine  [address] [place], [place] [id number]  Phone: [phone]  Fax: [phone]  Follow Up Time: 2 weeks    Harrison Carlton)  Surgery; Vascular Surgery  [address] [place] [address]  Phone: [phone]  Fax: [phone]  Follow Up Time: 2 weeks   Rahul Queen)  Geriatric Medicine; Internal Medicine  [address] [place], [place] [id number]  Phone: [phone]  Fax: [phone]  Follow Up Time: 2 weeks    Harrison Carlton)  Surgery; Vascular Surgery  [address] [place] [address]  Phone: [phone]  Fax: [phone]  Follow Up Time: 2 weeks    Sanjuana Rojas)  Gastroenterology; Internal Medicine  [address] [place] [address]  Phone: [phone]  Fax: [phone]  Follow Up Time: 2 weeks

## 2023-05-19 NOTE — PHYSICAL THERAPY INITIAL EVALUATION ADULT - PERSONAL SAFETY AND JUDGMENT, REHAB EVAL
[facility] ([facility] [facility]  Phone: [phone]    Fax [phone]                  Kellie Concepcion MD, Clotilde Brown MD, [address] [facility], MD, MD Opal Garcia MD Nelva Bramble, MD Peggy Stabs, APRN      Terrie Park, RAVINDER Choi, APRN Fredy Cabot, APRELVER    CARDIOLOGY  NOTE      10/14/2020    RE: Gianluca Lundberg  (1947)                               TO:  Dr. Lorenzo Farrell MD  The primary cardiologist is Dr. Gasper Harrison     CC:   1. Coronary artery disease involving native coronary artery of native heart without angina pectoris    2. Essential hypertension    3. Mixed hyperlipidemia    4. Type 2 diabetes mellitus without complication, with long-term current use of insulin ([address]        Patient denies all of the following:  Chest Pain  Palpitations  Shortness of Breath  Edema  Dizziness  Syncope      HPI: Thank you for involving me in taking care of your patient Gianluca Lundberg, who is a  81y.o. year old male with a history as listed above. Patient presents to the office for follow up on CAD (CABG x 3), HTN, and hyperlipidemia. Patient is  an active male who does walk regularly. Patient is  compliant with he medications. Patient denies any cardiac complaints or needs.      Vitals:    10/14/20 1121   BP: 124/62   Pulse: 60       Current Outpatient Medications   Medication Sig Dispense Refill    pantoprazole (PROTONIX) 40 MG tablet Take 40 mg by mouth daily      metFORMIN (GLUCOPHAGE) 1000 MG tablet Take 1,000 mg by mouth 2 times daily (with meals)      lisinopril-hydrochlorothiazide (PRINZIDE;ZESTORETIC) 10-12.5 MG per tablet Take 1 tablet by mouth daily 30 tablet 6    glipiZIDE-metformin (METAGLIP) 5-500 MG per tablet Take 1 tablet by mouth 2 times daily (before meals)      atorvastatin (LIPITOR) 20 MG tablet Take 20 mg by mouth daily      aspirin 81 MG EC tablet Take 1 tablet by mouth palpitations  Gastrointestinal: no abdominal pain, change in bowel habits, or black or bloody stools  Genito-Urinary: no dysuria, trouble voiding, or hematuria  Musculoskeletal: negative for - gait disturbance, pain, swelling    Neurological: negative for - confusion, dizziness, impaired coordination/balance or numbness/tingling    Objective:      Physical Exam:  /62   Pulse 60   Ht 5' 7\" (1.702 m)   Wt 176 lb 3.2 oz (79.9 kg)   BMI 27.60 kg/m²   Wt Readings from Last 3 Encounters:   10/14/20 176 lb 3.2 oz (79.9 kg)   01/28/20 173 lb 12.8 oz (78.8 kg)   05/29/19 180 lb (81.6 kg)     Body mass index is 27.6 kg/m². GENERAL - Alert, oriented, pleasant, in no apparent distress. Head unremarkable  Eyes - pupils equal and reactive to light - bilateral conjunctiva are pink: sclera are white   ENT - external ears intact, nose is intact:  tongue is midline pink and moist  Neck - Supple. No jugular venous distention noted. No carotid bruits appreciated. Cardiovascular - Normal S1 and S2:  murmur appreciated No, No gallop. Regular rate- Yes,  rhythm regular-Yes. Extremities - No cyanosis, clubbing, no edema to lower legs. Pulmonary - No respiratory distress. No wheezes or rales. Chest is clear  Pulses: Bilateral radial and pedal pulses normal  Abdomen -  Soft no tenderness, non distended   Musculoskeletal - Normal movement of all extremities   Neurologic - alert and oriented: There are no gross focal neurologic abnormalities.    Skin-  No rash: No echymosis   Affect- normal mood and pleasant       DATA:  Lab Results   Component Value Date    CKTOTAL 161 05/22/2012    CKMB 3.0 05/22/2012    TROPONINI <0.006 05/23/2012     BNP:    Lab Results   Component Value Date    BNP 17 05/22/2012     PT/INR:  No results found for: PTINR  Lab Results   Component Value Date    LABA1C 8.8 (H) 03/23/2017    LABA1C 8.8 (H) 03/22/2017     Lab Results   Component Value Date    CHOL 190 04/17/2018    TRIG 244 04/17/2018 HDL 37 04/17/2018    LDLCALC 109 04/17/2018    LDLDIRECT 128 (H) 03/23/2017     Lab Results   Component Value Date    ALT 24 05/23/2018    AST 20 05/23/2018     TSH:  No results found for: TSH    Echo:6/11/18  Normal left ventricle structure and function. Ejection fraction is visually estimated at 55-60%. No significant valvular disease noted. No evidence of pericardial effusion. Essentially normal echo. Cath: 3/23/17  Severe 3 vessel disease     Stress Test:5/11/17  No ischemia       The 10-year ASCVD risk score (Fletcher Mark, et al., 2013) is: 38.1%    Values used to calculate the score:      Age: 81 years      Sex: Male      Is Non- : No      Diabetic: Yes      Tobacco smoker: No      Systolic Blood Pressure: 805 mmHg      Is BP treated: Yes      HDL Cholesterol: 37 mg/dL      Total Cholesterol: 190 mg/dL    Assessment/ Plan:     HTN (hypertension)    Controlled  He is to continue current medications coreg, prinzide  advised low salt diet   Testing ordered:  no   Last testing: Echo 2018    Coronary artery disease involving native coronary artery of native heart without angina pectoris  CABG x 3    Primary and secondary prevention discussed with patient:   -Low sodium cardiac diet   -exercise 30 min a day three days a week  Continue current medications lipitor 20 mg, coreg 6.25 mg BID, prinizide,        Mixed hyperlipidemia  -At or near goal No  -No recent labs available- will request copy from PCP- lab slip given   -He is to continue current medications (lipitor) Hepatic function panel WNL. No abdominal pain. No myalgias.     -The nature of cardiac risk has been fully discussed with this patient. I have made him aware of his LDL target goal given his cardiovascular risk analysis. I have discussed the appropriate diet. The need for lifelong compliance in order to reduce risk is stressed.  A regular exercise program is recommended to help achieve and maintain normal body weight, fitness and improve lipid balance. A written copy of a low fat, low cholesterol diet has been given to the patient. DM2 (diabetes mellitus, type 2) ([facility])  Unsure of recent A1C, managed by PCP. Home readings 100-140. Patient seen, interviewed and examined. Testing was reviewed. Patient is encouraged to exercise even a brisk walk for 30 minutes at least 3 to 4 times a week. Lifestyle and risk factor modificatons discussed. Various goals are discussed and questions answered. Continue current medications. Appropriate prescriptions are addressed. Questions answered and patient verbalizes understanding. Call for any problems, questions, or concerns. Pt is to follow up in 9 months for Cardiac management    Electronically signed by Rachael Thomas.  RAVINDER Lowery CNP on 10/14/2020 at 11:49 AM intact

## 2023-05-19 NOTE — PROGRESS NOTE ADULT - SUBJECTIVE AND OBJECTIVE BOX
Gastroenterology progress note:     Patient is a 78y old  Male who presents with a chief complaint of upper GI bleed (19 May 2023 13:19)       Admitted on: 05-12-23    We are following the patient for hematemesis    no overnight events no melena no hematemesis on short acting AC     PAST MEDICAL & SURGICAL HISTORY:  Hypertension      CAD (coronary artery disease)      S/P CABG x 1      Unspecified atrial fibrillation      Chronic systolic congestive heart failure      Asthma          MEDICATIONS  (STANDING):  atorvastatin 20 milliGRAM(s) Oral at bedtime  chlorhexidine 2% Cloths 1 Application(s) Topical daily  enoxaparin Injectable 100 milliGRAM(s) SubCutaneous every 12 hours  ferrous    sulfate 325 milliGRAM(s) Oral daily  furosemide    Tablet 20 milliGRAM(s) Oral daily  lactated ringers Bolus 500 milliLiter(s) IV Bolus once  metoprolol tartrate 25 milliGRAM(s) Oral three times a day  montelukast 10 milliGRAM(s) Oral daily  pantoprazole   Suspension 40 milliGRAM(s) Oral two times a day  potassium chloride   Powder 40 milliEquivalent(s) Oral once  potassium chloride   Powder 40 milliEquivalent(s) Oral once  sacubitril 24 mG/valsartan 26 mG 1 Tablet(s) Oral two times a day  tamsulosin 0.4 milliGRAM(s) Oral at bedtime    MEDICATIONS  (PRN):  albuterol/ipratropium for Nebulization 3 milliLiter(s) Nebulizer every 6 hours PRN Shortness of Breath and/or Wheezing      Allergies  No Known Allergies      Review of Systems:   Cardiovascular:  No Chest Pain, No Palpitations  Respiratory:  No Cough, No Dyspnea  Gastrointestinal:  As described in HPI  Skin:  No Skin Lesions, No Jaundice  Neuro:  No Syncope, No Dizziness    Physical Examination:  T(C): 36.6 (05-19-23 @ 08:30), Max: 37 (05-18-23 @ 15:36)  HR: 58 (05-19-23 @ 12:30) (42 - 80)  BP: 94/53 (05-19-23 @ 12:30) (72/41 - 118/63)  RR: 18 (05-19-23 @ 12:30) (18 - 19)  SpO2: 100% (05-19-23 @ 12:30) (98% - 100%)      05-18-23 @ 07:01  -  05-19-23 @ 07:00  --------------------------------------------------------  IN: 890 mL / OUT: 1250 mL / NET: -360 mL        GENERAL: AAOx3, no acute distress.  HEAD:  Atraumatic, Normocephalic  EYES: conjunctiva and sclera clear  NECK: Supple, no JVD or thyromegaly  CHEST/LUNG: Clear to auscultation bilaterally; No wheeze, rhonchi, or rales  HEART: Regular rate and rhythm; normal S1, S2, No murmurs.  ABDOMEN: Soft, nontender, nondistended; Bowel sounds present  NEUROLOGY: No asterixis or tremor.   SKIN: Intact, no jaundice     Data:                        8.2    4.69  )-----------( 208      ( 19 May 2023 07:18 )             27.5     Hgb trend:  8.2  05-19-23 @ 07:18  8.6  05-18-23 @ 08:36  9.0  05-17-23 @ 17:17  8.5  05-17-23 @ 06:44        05-19    141  |  104  |  13  ----------------------------<  82  3.0<L>   |  29  |  1.0    Ca    7.7<L>      19 May 2023 07:18  Mg     2.0     05-19    TPro  5.4<L>  /  Alb  3.4<L>  /  TBili  1.2  /  DBili  x   /  AST  12  /  ALT  9   /  AlkPhos  56  05-19    Liver panel trend:  TBili 1.2   /   AST 12   /   ALT 9   /   AlkP 56   /   Tptn 5.4   /   Alb 3.4    /   DBili --      05-19  TBili 1.1   /   AST 18   /   ALT 12   /   AlkP 61   /   Tptn 5.7   /   Alb 3.7    /   DBili --      05-18  TBili 1.0   /   AST 16   /   ALT 11   /   AlkP 58   /   Tptn 5.9   /   Alb 3.7    /   DBili --      05-17  TBili 0.8   /   AST 15   /   ALT 9   /   AlkP 56   /   Tptn 5.5   /   Alb 3.5    /   DBili --      05-16  TBili 0.9   /   AST 14   /   ALT 10   /   AlkP 52   /   Tptn 5.4   /   Alb 3.5    /   DBili --      05-15  TBili 0.8   /   AST 14   /   ALT 11   /   AlkP 49   /   Tptn 5.2   /   Alb 3.4    /   DBili --      05-14  TBili 0.7   /   AST 12   /   ALT 9   /   AlkP 49   /   Tptn 5.3   /   Alb 3.4    /   DBili --      05-13  TBili 0.7   /   AST 11   /   ALT 8   /   AlkP 51   /   Tptn 5.4   /   Alb 3.4    /   DBili --      05-12  TBili 0.5   /   AST 11   /   ALT 9   /   AlkP 52   /   Tptn 5.5   /   Alb 3.5    /   DBili --      05-12      PTT - ( 17 May 2023 17:17 )  PTT:31.9 sec       Radiology:

## 2023-05-19 NOTE — DISCHARGE NOTE PROVIDER - HOSPITAL COURSE
77yo M hx of afib on eliquis, HFrEF (unknown EF), CAD s/p CABG (8 years ago), HTN, asthma presented to the hospital for syncopal episode after feeling dizzy yesterday evening.  Patient speaks a dialect similar to Cantonese but is a village dialect. Patient is able to understand Cantonese, but 100%  will not be able to understand the patient's language. I understood 100% of patient's conversation.   Patient was feeling dizzy while walking to the bathroom at 10PM  followed by a fall. +LOC, + AC. Unsure if he had head trauma, but does not experience any headaches. Unwitnessed by family. Denies fevers, shortness of breath, chest pain, abdominal pain, diarrhea, constipation, dysuria.   Upon arrival to the ED, patient was complaining of having productive sputum needing to cough, epistaxis, and followed by ~50cc of hematemesis. Brought into hospital via EMS  At the ED, trauma alert was activated due to the fall. Dried blood found in the nares. VS was T-97.8F, BP: 97/54, HR: 71bpm, RR: 17bpm, SpO2: 96% on RA. After episode of hemoptysis, patient's SBP dropped from 89-->85. 2u prbc were ordered resulting in improvement of BP. Labs significant for microcytic anemia (Hb- 7.3), BUN- 33. troponin<0.01 x1.   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  CTA Abd+Pelvis: no acute pathology  Xray Pelvis: no acute fracture  Patient admitted to ICU for Upper GI bleed. Currently, VS stable: with SBP 110s. Given 2u prbc. started octrotide and protonix infusion. s/p ceftriaxone and erythromycin IV. given K centra 5g. Vascular surgery consulted for aortic dissection. No acute surgical intervention, Recommend keep -140 SBP.  per ED signout. GI consutled for GI Bleed. Plan for colonoscopy in today.       ICU COURSE:   Cardiology, CT surgery, and Vascular Surgery evaluated patient for aortic dissection, stated no acute surgical intervention, and consult GI for GIB. Pt received 2u RBC in ED. No further active bleeding since ED presentation. GI following, initially plan was for EGD, but given high cardiac risk, will not do EGD until patient is more stable given no active bleeding.  Patient started on esmolol drip to keep HR <70 and SBP <140 for aortic dissection (read as acute on CTA chest, but may be chronic). Started lopressor 25mg q8h, restarted home entresto, and started lasix 40mg qd (on 20mg qd at home). Since 5/16 early afternoon pt's HR and BP have been controlled off esmolol drip. Repeat CBC stable. Downgrade to stepdown approved by Dr. Florentino Mathews.     CEU course   patient remained stable in ceu off esmolol drip and hemoglobin stable- now dgtf       #Suspected Upper GI Bleed  #Hematemesis   #Acute blood loss anemia - resolved s/p 2u RBC  - pt presented with hematemesis, epistaxis, and fall associated with hypotension  - Hgb 7.3 received 2u RBC, repeat Hgb stable  - no further active bleeding since ED presentation  - GI following -- no EGD   - IV PPI BID  - CBC q12h, transfuse for Hgb < 7.0  - ferrous sulfate 325mg qd     #Unwitnessed fall  - trauma workup CTH, CT spine, CT A/P, XR pelvis negative   - 5/12 echo: EF 68%, mild-mod AR, mild MR, mild TR, mild pHTN  - fall precautions    #Acute type B aortic dissection (very short segment)  - CTA chest aorta w/wo IV contrast: very short segment acute type B aortic dissection 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 extending few cm from aortic arch, no evidence of acute traumatic injury, arterial extravasation  - trop negative   - Vascular surgery consulted: strict BP control, no acute surgery, hold all AC for GIB  - CT Surgery consulted, rec BP control, fluid resuscitation for hypovolemia; keep HR < 80  - Cardio consulted: start esmolol drip goal HR 50s, add nicardipine if remains hypertensive, hold AC  - keep SBP < 120, HR < 60  - off esmolol drip  - started lopressor 25mg q8h     #H/o HFrEF (unknown EF) with now recovered EF  #CAD s/p CABG   - at home is on lasix 20mg qd entresto 24-26mg qd, eplerenone 25mg qd  - restart entresto  - increased lasix to 40mg qd   - no fluids  - repeat 5/12 echo: EF 68%, mild-mod AR, mild MR, mild TR, mild pHTN  - statin    #Paroxysmal afib on eliquis  - not on any rate control at home  - start lopressor 50mg q8h  - esmolol drip to keep HR < 60 for aortic dissection -- wean  - hold AC for GIB  - duplex negative for DVT    #Hx of asthma  - not in exacerbation  - SpO2 98% on RA, no wheezing   - c/w home montelukast 10mg qd       Patient is medically stable for discharge  79yo M hx of afib on eliquis, HFrEF (unknown EF), CAD s/p CABG (8 years ago), HTN, asthma presented to the hospital for syncopal episode after feeling dizzy yesterday evening.  Patient speaks a dialect similar to Cantonese but is a village dialect. Patient is able to understand Cantonese, but 100%  will not be able to understand the patient's language. I understood 100% of patient's conversation.   Patient was feeling dizzy while walking to the bathroom at 10PM  followed by a fall. +LOC, + AC. Unsure if he had head trauma, but does not experience any headaches. Unwitnessed by family. Denies fevers, shortness of breath, chest pain, abdominal pain, diarrhea, constipation, dysuria.   Upon arrival to the ED, patient was complaining of having productive sputum needing to cough, epistaxis, and followed by ~50cc of hematemesis. Brought into hospital via EMS  At the ED, trauma alert was activated due to the fall. Dried blood found in the nares. VS was T-97.8F, BP: 97/54, HR: 71bpm, RR: 17bpm, SpO2: 96% on RA. After episode of hemoptysis, patient's SBP dropped from 89-->85. 2u prbc were ordered resulting in improvement of BP. Labs significant for microcytic anemia (Hb- 7.3), BUN- 33. troponin<0.01 x1.   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  CTA Abd+Pelvis: no acute pathology  Xray Pelvis: no acute fracture  Patient admitted to ICU for Upper GI bleed. Currently, VS stable: with SBP 110s. Given 2u prbc. started octrotide and protonix infusion. s/p ceftriaxone and erythromycin IV. given K centra 5g. Vascular surgery consulted for aortic dissection. No acute surgical intervention, Recommend keep -140 SBP.  per ED signout. GI consutled for GI Bleed. Plan for colonoscopy in today.       ICU COURSE:   Cardiology, CT surgery, and Vascular Surgery evaluated patient for aortic dissection, stated no acute surgical intervention, and consult GI for GIB. Pt received 2u RBC in ED. No further active bleeding since ED presentation. GI following, initially plan was for EGD, but given high cardiac risk, will not do EGD until patient is more stable given no active bleeding.  Patient started on esmolol drip to keep HR <70 and SBP <140 for aortic dissection (read as acute on CTA chest, but may be chronic). Started lopressor 25mg q8h, restarted home entresto, and started lasix 40mg qd (on 20mg qd at home). Since 5/16 early afternoon pt's HR and BP have been controlled off esmolol drip. Repeat CBC stable. Downgrade to stepdown approved by Dr. Florentino Mathews.     CEU course   patient remained stable in ceu off esmolol drip and hemoglobin stable- now dgtf       #Suspected Upper GI Bleed  #Hematemesis   #Acute blood loss anemia - resolved s/p 2u RBC  - pt presented with hematemesis, epistaxis, and fall associated with hypotension  - Hgb 7.3 received 2u RBC, repeat Hgb stable  - no further active bleeding since ED presentation  - GI following -- no EGD, will follow up as an outpatient   - ferrous sulfate 325mg qd     #Unwitnessed fall  - trauma workup CTH, CT spine, CT A/P, XR pelvis negative   - 5/12 echo: EF 68%, mild-mod AR, mild MR, mild TR, mild pHTN  - fall precautions    #Acute type B aortic dissection (very short segment)  - CTA chest aorta w/wo IV contrast: very short segment acute type B aortic dissection 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 extending few cm from aortic arch, no evidence of acute traumatic injury, arterial extravasation  - trop negative   - Vascular surgery consulted: strict BP control, no acute surgery, hold all AC for GIB  - CT Surgery consulted, rec BP control, fluid resuscitation for hypovolemia; keep HR < 80  - Cardio consulted: start esmolol drip goal HR 50s, add nicardipine if remains hypertensive, hold AC  - keep SBP < 120, HR < 60  - off esmolol drip  - discharge with  lopressor 25mg q8h     #H/o HFrEF (unknown EF) with now recovered EF  #CAD s/p CABG   - at home is on lasix 20mg qd entresto 24-26mg qd, eplerenone 25mg qd  - dc with entresto  - discharge without lasix due to hypotension    - no fluids  - repeat 5/12 echo: EF 68%, mild-mod AR, mild MR, mild TR, mild pHTN  - statin    #Paroxysmal afib on eliquis  - not on any rate control at home  - esmolol drip to keep HR < 60 for aortic dissection -- wean  - hold AC for GIB  - duplex negative for DVT    #Hx of asthma  - not in exacerbation  - SpO2 98% on RA, no wheezing   - c/w home montelukast 10mg qd       Patient is medically stable for discharge.  Patient was offered home care and refused

## 2023-05-19 NOTE — DISCHARGE NOTE PROVIDER - ATTENDING DISCHARGE PHYSICAL EXAMINATION:
Vital Signs Last 24 Hrs  T(F): 97.7 (20 May 2023 08:00), Max: 98.5 (19 May 2023 16:14)  HR: 50 (20 May 2023 08:00) (50 - 80)  BP: 97/50 (20 May 2023 08:00) (89/54 - 133/65)  RR: 18 (20 May 2023 00:00) (18 - 18)  SpO2: 97% (20 May 2023 00:00) (96% - 97%)    PHYSICAL EXAM:  GENERAL: NAD, well-groomed, well-developed  HEAD:  Atraumatic, Normocephalic  EYES: EOMI, conjunctiva and sclera clear  ENMT: Moist mucous membranes, Good dentition, no thrush  NECK: Supple, No JVD  CHEST/LUNG: Clear to auscultation bilaterally, good air entry, non-labored breathing  HEART: RRR; S1/S2, No murmur  ABDOMEN: Soft, Nontender, Nondistended; obese  VASCULAR: Normal pulses, Normal capillary refill  EXTREMITIES: No calf tenderness, No cyanosis, No edema  LYMPH: Normal; No lymphadenopathy noted  SKIN: Warm, Intact  PSYCH: Normal mood, Normal affect  NERVOUS SYSTEM:  A/O x3, Good concentration; CN 2-12 intact, No focal deficits

## 2023-05-19 NOTE — PROGRESS NOTE ADULT - ASSESSMENT
H&P:  77yo M hx of afib on eliquis, HFrEF (unknown EF), CAD s/p CABG (8 years ago), HTN, asthma presented to the hospital for syncopal episode after feeling dizzy yesterday evening.  Patient speaks a dialect similar to Cantonese but is a village dialect. Patient is able to understand Cantonese, but 100%  will not be able to understand the patient's language. I understood 100% of patient's conversation.   Patient was feeling dizzy while walking to the bathroom at 10PM  followed by a fall. +LOC, + AC. Unsure if he had head trauma, but does not experience any headaches. Unwitnessed by family. Denies fevers, shortness of breath, chest pain, abdominal pain, diarrhea, constipation, dysuria.   Upon arrival to the ED, patient was complaining of having productive sputum needing to cough, epistaxis, and followed by ~50cc of hematemesis. Brought into hospital via EMS  At the ED, trauma alert was activated due to the fall. Dried blood found in the nares. VS was T-97.8F, BP: 97/54, HR: 71bpm, RR: 17bpm, SpO2: 96% on RA. After episode of hemoptysis, patient's SBP dropped from 89-->85. 2u prbc were ordered resulting in improvement of BP. Labs significant for microcytic anemia (Hb- 7.3), BUN- 33. troponin<0.01 x1.   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  CTA Abd+Pelvis: no acute pathology  Xray Pelvis: no acute fracture  Patient admitted to ICU for Upper GI bleed. Currently, VS stable: with SBP 110s. Given 2u prbc. started octrotide and protonix infusion. s/p ceftriaxone and erythromycin IV. given K centra 5g. Vascular surgery consulted for aortic dissection. No acute surgical intervention, Recommend keep -140 SBP.  per ED signout. GI consutled for GI Bleed. Plan for colonoscopy in today.       ICU COURSE:   Cardiology, CT surgery, and Vascular Surgery evaluated patient for aortic dissection, stated no acute surgical intervention, and consult GI for GIB. Pt received 2u RBC in ED. No further active bleeding since ED presentation. GI following, initially plan was for EGD, but given high cardiac risk, will not do EGD until patient is more stable given no active bleeding.  Patient started on esmolol drip to keep HR <70 and SBP <140 for aortic dissection (read as acute on CTA chest, but may be chronic). Started lopressor 25mg q8h, restarted home entresto, and started lasix 40mg qd (on 20mg qd at home). Since 5/16 early afternoon pt's HR and BP have been controlled off esmolol drip. Repeat CBC stable. Downgrade to stepdown approved by Dr. Florentino Mathews.     CEU course   patient remained stable in ceu off esmolol drip and hemoglobin stable- now dgtf       #Suspected Upper GI Bleed  #Hematemesis   #Acute blood loss anemia - resolved s/p 2u RBC  - pt presented with hematemesis, epistaxis, and fall associated with hypotension  - Hgb 7.3 received 2u RBC, repeat Hgb stable  - no further active bleeding since ED presentation  - GI following -- no EGD   - IV PPI BID  - CBC q12h, transfuse for Hgb < 7.0  - ferrous sulfate 325mg qd     #Unwitnessed fall  - trauma workup CTH, CT spine, CT A/P, XR pelvis negative   - 5/12 echo: EF 68%, mild-mod AR, mild MR, mild TR, mild pHTN  - fall precautions    #Acute type B aortic dissection (very short segment)  - CTA chest aorta w/wo IV contrast: very short segment acute type B aortic dissection 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 extending few cm from aortic arch, no evidence of acute traumatic injury, arterial extravasation  - trop negative   - Vascular surgery consulted: strict BP control, no acute surgery, hold all AC for GIB  - CT Surgery consulted, rec BP control, fluid resuscitation for hypovolemia; keep HR < 80  - Cardio consulted: start esmolol drip goal HR 50s, add nicardipine if remains hypertensive, hold AC  - keep SBP < 120, HR < 60  - off esmolol drip  - started lopressor 25mg q8h     #H/o HFrEF (unknown EF) with now recovered EF  #CAD s/p CABG   - at home is on lasix 20mg qd entresto 24-26mg qd, eplerenone 25mg qd  - restart entresto  - increased lasix to 40mg qd   - no fluids  - repeat 5/12 echo: EF 68%, mild-mod AR, mild MR, mild TR, mild pHTN  - statin    #Paroxysmal afib on eliquis  - not on any rate control at home  - start lopressor 50mg q8h  - esmolol drip to keep HR < 60 for aortic dissection -- wean  - hold AC for GIB  - duplex negative for DVT    #Hx of asthma  - not in exacerbation  - SpO2 98% on RA, no wheezing   - c/w home montelukast 10mg qd     Misc  - DVT ppx: SCDs  - GI ppx: IV PPI BID  - Diet:  low fiber soft bite sized   - Activity: IAT  - Code status: FULL.       H&P:  77yo M hx of afib on eliquis, HFrEF (unknown EF), CAD s/p CABG (8 years ago), HTN, asthma presented to the hospital for syncopal episode after feeling dizzy yesterday evening.  Patient speaks a dialect similar to Cantonese but is a village dialect. Patient is able to understand Cantonese, but 100%  will not be able to understand the patient's language. I understood 100% of patient's conversation.   Patient was feeling dizzy while walking to the bathroom at 10PM  followed by a fall. +LOC, + AC. Unsure if he had head trauma, but does not experience any headaches. Unwitnessed by family. Denies fevers, shortness of breath, chest pain, abdominal pain, diarrhea, constipation, dysuria.   Upon arrival to the ED, patient was complaining of having productive sputum needing to cough, epistaxis, and followed by ~50cc of hematemesis. Brought into hospital via EMS  At the ED, trauma alert was activated due to the fall. Dried blood found in the nares. VS was T-97.8F, BP: 97/54, HR: 71bpm, RR: 17bpm, SpO2: 96% on RA. After episode of hemoptysis, patient's SBP dropped from 89-->85. 2u prbc were ordered resulting in improvement of BP. Labs significant for microcytic anemia (Hb- 7.3), BUN- 33. troponin<0.01 x1.   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  CTA Abd+Pelvis: no acute pathology  Xray Pelvis: no acute fracture  Patient admitted to ICU for Upper GI bleed. Currently, VS stable: with SBP 110s. Given 2u prbc. started octrotide and protonix infusion. s/p ceftriaxone and erythromycin IV. given K centra 5g. Vascular surgery consulted for aortic dissection. No acute surgical intervention, Recommend keep -140 SBP.  per ED signout. GI consutled for GI Bleed. Plan for colonoscopy in today.       ICU COURSE:   Cardiology, CT surgery, and Vascular Surgery evaluated patient for aortic dissection, stated no acute surgical intervention, and consult GI for GIB. Pt received 2u RBC in ED. No further active bleeding since ED presentation. GI following, initially plan was for EGD, but given high cardiac risk, will not do EGD until patient is more stable given no active bleeding.  Patient started on esmolol drip to keep HR <70 and SBP <140 for aortic dissection (read as acute on CTA chest, but may be chronic). Started lopressor 25mg q8h, restarted home entresto, and started lasix 40mg qd (on 20mg qd at home). Since 5/16 early afternoon pt's HR and BP have been controlled off esmolol drip. Repeat CBC stable. Downgrade to stepdown approved by Dr. Florentino Mathews.     CEU course   patient remained stable in ceu off esmolol drip and hemoglobin stable- now dgtf       #Suspected Upper GI Bleed  #Hematemesis   #Acute blood loss anemia - resolved s/p 2u RBC  - pt presented with hematemesis, epistaxis, and fall associated with hypotension  - Hgb 7.3 received 2u RBC, repeat Hgb stable  - no further active bleeding since ED presentation  - GI following -- no EGD   - IV PPI BID  - CBC q12h, transfuse for Hgb < 7.0  - ferrous sulfate 325mg qd     #Unwitnessed fall  - trauma workup CTH, CT spine, CT A/P, XR pelvis negative   - 5/12 echo: EF 68%, mild-mod AR, mild MR, mild TR, mild pHTN  - fall precautions    #Acute type B aortic dissection (very short segment)  - CTA chest aorta w/wo IV contrast: very short segment acute type B aortic dissection 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 extending few cm from aortic arch, no evidence of acute traumatic injury, arterial extravasation  - trop negative   - Vascular surgery consulted: strict BP control, no acute surgery, hold all AC for GIB  - CT Surgery consulted, rec BP control, fluid resuscitation for hypovolemia; keep HR < 80  - Cardio consulted: start esmolol drip goal HR 50s, add nicardipine if remains hypertensive, hold AC  - keep SBP < 120, HR < 60  - off esmolol drip  - started lopressor 25mg q8h     #H/o HFrEF (unknown EF) with now recovered EF  #CAD s/p CABG   - at home is on lasix 20mg qd entresto 24-26mg qd, eplerenone 25mg qd  - restart entresto  - increased lasix to 40mg qd   - no fluids  - repeat 5/12 echo: EF 68%, mild-mod AR, mild MR, mild TR, mild pHTN  - statin    #Hypotension  - Due to multiple high dose anti hypertensives  - Will hold BP meds except metoprolol  - decrease metoprolol dose to 25mg q8  - will re evaluate       #Paroxysmal afib on eliquis  - not on any rate control at home  - start lopressor 50mg q8h  - esmolol drip to keep HR < 60 for aortic dissection -- wean  - hold AC for GIB  - duplex negative for DVT    #Hx of asthma  - not in exacerbation  - SpO2 98% on RA, no wheezing   - c/w home montelukast 10mg qd     Misc  - DVT ppx: SCDs  - GI ppx: IV PPI BID  - Diet:  low fiber soft bite sized   - Activity: IAT  - Code status: FULL.      Pending: bp improvement

## 2023-05-19 NOTE — PROGRESS NOTE ADULT - ASSESSMENT
77yo M hx of afib on eliquis, HFrEF (unknown EF), CAD s/p CABG (8 years ago), HTN, asthma presented to the hospital for syncopal episode after feeling dizzy yesterday evening. Patient was feeling dizzy while walking to the bathroom at 10PM  followed by a fall. +LOC. Denies fevers, shortness of breath, , abdominal pain, diarrhea, melena, hematochezia or constipation. Upon arrival to the ED, patient was complaining of having productive sputum needing to cough, epistaxis, and followed by ~50cc of hematemesis. Labs significant for microcytic anemia (Hb- 7.3), gi consulted to r/o upper gi bleeding.     # Reported hematemesis R/O Upper GIB   Given small amount of blood described and history of preceding epistaxis, reported hematemesis is likely secondary to epistaxis   - microcytic anemia 7.3 > 8.6   - hx of bleeding gastric ulcer 2/2023  - BUN 34 Cr 0.8   - CTAP no acute pathology   - hx of HUMAIRA on iron supplement   - JESSICA brown stool     PLAN:   Diet as tolerated   PPI BID   EGD as outpatient (risk outweighs the benefits given no active bleed now). Appreciate cardio recs.   obtain records from [facility]  Aortic dissection management per vascular and cardiology  call as needed

## 2023-05-19 NOTE — HOME OXYGEN EVALUATION NOTE - NSHOMEOXYEVALO2_O2AMBULATING_GEN_ALL_CORE
· POA, HR in the 120s on recent view of vital signs  · Recheck vitals  · Continue IVF hydration  · Pt asymptomatic   · Telemetry monitoring   · R/o infectious etiology as above 94

## 2023-05-19 NOTE — DISCHARGE NOTE PROVIDER - NSDCMRMEDTOKEN_GEN_ALL_CORE_FT
atorvastatin 20 mg oral tablet: 1 tab(s) orally once a day  benzonatate 100 mg oral capsule: 2 cap(s) orally 3 times a day as needed for  cough  docusate sodium 100 mg oral tablet: 1 tab(s) orally once a day  Eliquis 5 mg oral tablet: 1 tab(s) orally 2 times a day  Entresto 24 mg-26 mg oral tablet: 1 tab(s) orally once a day  eplerenone 25 mg oral tablet: 1 tab(s) orally once a day  eplerenone 25 mg oral tablet: 1 tab(s) orally once a day  ferrous fumarate 325 mg (106 mg elemental iron) oral tablet: 1 tab(s) orally every 48 hours  furosemide 20 mg oral tablet: 1 tab(s) orally once a day  meclizine 25 mg oral tablet: 1 tab(s) orally once a day  metoprolol tartrate 50 mg oral tablet: 1 tab(s) orally every 8 hours  montelukast 10 mg oral tablet: 1 tab(s) orally once a day  tamsulosin 0.4 mg oral capsule: 1 cap(s) orally once a day   Albuterol (Eqv-ProAir HFA) 90 mcg/inh inhalation aerosol: 2 puff(s) inhaled every 6 hours as needed for  shortness of breath and/or wheezing  atorvastatin 20 mg oral tablet: 1 tab(s) orally once a day  benzonatate 100 mg oral capsule: 2 cap(s) orally 3 times a day as needed for  cough  docusate sodium 100 mg oral tablet: 1 tab(s) orally once a day  Eliquis 5 mg oral tablet: 1 tab(s) orally 2 times a day  Entresto 24 mg-26 mg oral tablet: 1 tab(s) orally once a day  eplerenone 25 mg oral tablet: 1 tab(s) orally once a day  ferrous fumarate 325 mg (106 mg elemental iron) oral tablet: 1 tab(s) orally every 48 hours  furosemide 20 mg oral tablet: 1 tab(s) orally once a day  meclizine 25 mg oral tablet: 1 tab(s) orally once a day  metoprolol tartrate 25 mg oral tablet: 1 tab(s) orally 3 times a day  montelukast 10 mg oral tablet: 1 tab(s) orally once a day  tamsulosin 0.4 mg oral capsule: 1 cap(s) orally once a day   Albuterol (Eqv-ProAir HFA) 90 mcg/inh inhalation aerosol: 2 puff(s) inhaled every 6 hours as needed for  shortness of breath and/or wheezing  atorvastatin 20 mg oral tablet: 1 tab(s) orally once a day  benzonatate 100 mg oral capsule: 2 cap(s) orally 3 times a day as needed for  cough  docusate sodium 100 mg oral tablet: 1 tab(s) orally once a day  Eliquis 5 mg oral tablet: 1 tab(s) orally 2 times a day  Entresto 24 mg-26 mg oral tablet: 1 tab(s) orally once a day  ferrous fumarate 325 mg (106 mg elemental iron) oral tablet: 1 tab(s) orally every 48 hours  meclizine 25 mg oral tablet: 1 tab(s) orally once a day  metoprolol tartrate 25 mg oral tablet: 1 tab(s) orally 3 times a day  montelukast 10 mg oral tablet: 1 tab(s) orally once a day  tamsulosin 0.4 mg oral capsule: 1 cap(s) orally once a day

## 2023-05-20 VITALS — DIASTOLIC BLOOD PRESSURE: 50 MMHG | SYSTOLIC BLOOD PRESSURE: 97 MMHG | TEMPERATURE: 98 F | HEART RATE: 50 BPM

## 2023-05-20 LAB
ALBUMIN SERPL ELPH-MCNC: 3.9 G/DL — SIGNIFICANT CHANGE UP (ref 3.5–5.2)
ALP SERPL-CCNC: 66 U/L — SIGNIFICANT CHANGE UP (ref 30–115)
ALT FLD-CCNC: 11 U/L — SIGNIFICANT CHANGE UP (ref 0–41)
ANION GAP SERPL CALC-SCNC: 15 MMOL/L — HIGH (ref 7–14)
AST SERPL-CCNC: 17 U/L — SIGNIFICANT CHANGE UP (ref 0–41)
BASOPHILS # BLD AUTO: 0.06 K/UL — SIGNIFICANT CHANGE UP (ref 0–0.2)
BASOPHILS NFR BLD AUTO: 1.3 % — HIGH (ref 0–1)
BILIRUB SERPL-MCNC: 1.4 MG/DL — HIGH (ref 0.2–1.2)
BLD GP AB SCN SERPL QL: SIGNIFICANT CHANGE UP
BUN SERPL-MCNC: 12 MG/DL — SIGNIFICANT CHANGE UP (ref 10–20)
CALCIUM SERPL-MCNC: 8.1 MG/DL — LOW (ref 8.4–10.5)
CHLORIDE SERPL-SCNC: 105 MMOL/L — SIGNIFICANT CHANGE UP (ref 98–110)
CO2 SERPL-SCNC: 25 MMOL/L — SIGNIFICANT CHANGE UP (ref 17–32)
CREAT SERPL-MCNC: 1.1 MG/DL — SIGNIFICANT CHANGE UP (ref 0.7–1.5)
EGFR: 70 ML/MIN/1.73M2 — SIGNIFICANT CHANGE UP
EOSINOPHIL # BLD AUTO: 0.14 K/UL — SIGNIFICANT CHANGE UP (ref 0–0.7)
EOSINOPHIL NFR BLD AUTO: 3 % — SIGNIFICANT CHANGE UP (ref 0–8)
GLUCOSE SERPL-MCNC: 102 MG/DL — HIGH (ref 70–99)
HCT VFR BLD CALC: 30.1 % — LOW (ref 42–52)
HGB BLD-MCNC: 9.1 G/DL — LOW (ref 14–18)
IMM GRANULOCYTES NFR BLD AUTO: 0.2 % — SIGNIFICANT CHANGE UP (ref 0.1–0.3)
LYMPHOCYTES # BLD AUTO: 0.96 K/UL — LOW (ref 1.2–3.4)
LYMPHOCYTES # BLD AUTO: 20.9 % — SIGNIFICANT CHANGE UP (ref 20.5–51.1)
MAGNESIUM SERPL-MCNC: 2 MG/DL — SIGNIFICANT CHANGE UP (ref 1.8–2.4)
MCHC RBC-ENTMCNC: 22.4 PG — LOW (ref 27–31)
MCHC RBC-ENTMCNC: 30.2 G/DL — LOW (ref 32–37)
MCV RBC AUTO: 74.1 FL — LOW (ref 80–94)
MONOCYTES # BLD AUTO: 0.51 K/UL — SIGNIFICANT CHANGE UP (ref 0.1–0.6)
MONOCYTES NFR BLD AUTO: 11.1 % — HIGH (ref 1.7–9.3)
NEUTROPHILS # BLD AUTO: 2.92 K/UL — SIGNIFICANT CHANGE UP (ref 1.4–6.5)
NEUTROPHILS NFR BLD AUTO: 63.5 % — SIGNIFICANT CHANGE UP (ref 42.2–75.2)
NRBC # BLD: 0 /100 WBCS — SIGNIFICANT CHANGE UP (ref 0–0)
PLATELET # BLD AUTO: 249 K/UL — SIGNIFICANT CHANGE UP (ref 130–400)
PMV BLD: 10 FL — SIGNIFICANT CHANGE UP (ref 7.4–10.4)
POTASSIUM SERPL-MCNC: 3.1 MMOL/L — LOW (ref 3.5–5)
POTASSIUM SERPL-SCNC: 3.1 MMOL/L — LOW (ref 3.5–5)
PROT SERPL-MCNC: 6.2 G/DL — SIGNIFICANT CHANGE UP (ref 6–8)
RBC # BLD: 4.06 M/UL — LOW (ref 4.7–6.1)
RBC # FLD: 24.3 % — HIGH (ref 11.5–14.5)
SODIUM SERPL-SCNC: 145 MMOL/L — SIGNIFICANT CHANGE UP (ref 135–146)
WBC # BLD: 4.6 K/UL — LOW (ref 4.8–10.8)
WBC # FLD AUTO: 4.6 K/UL — LOW (ref 4.8–10.8)

## 2023-05-20 PROCEDURE — 99239 HOSP IP/OBS DSCHRG MGMT >30: CPT

## 2023-05-20 RX ORDER — ALBUTEROL 90 UG/1
2 AEROSOL, METERED ORAL
Qty: 1 | Refills: 0
Start: 2023-05-20

## 2023-05-20 RX ORDER — METOPROLOL TARTRATE 50 MG
1 TABLET ORAL
Qty: 84 | Refills: 0
Start: 2023-05-20 | End: 2023-06-16

## 2023-05-20 RX ORDER — POTASSIUM CHLORIDE 20 MEQ
40 PACKET (EA) ORAL ONCE
Refills: 0 | Status: COMPLETED | OUTPATIENT
Start: 2023-05-20 | End: 2023-05-20

## 2023-05-20 RX ADMIN — SACUBITRIL AND VALSARTAN 1 TABLET(S): 24; 26 TABLET, FILM COATED ORAL at 05:13

## 2023-05-20 RX ADMIN — MONTELUKAST 10 MILLIGRAM(S): 4 TABLET, CHEWABLE ORAL at 11:05

## 2023-05-20 RX ADMIN — Medication 20 MILLIGRAM(S): at 05:12

## 2023-05-20 RX ADMIN — Medication 325 MILLIGRAM(S): at 11:05

## 2023-05-20 RX ADMIN — ENOXAPARIN SODIUM 100 MILLIGRAM(S): 100 INJECTION SUBCUTANEOUS at 05:13

## 2023-05-20 RX ADMIN — Medication 25 MILLIGRAM(S): at 13:54

## 2023-05-20 RX ADMIN — Medication 40 MILLIEQUIVALENT(S): at 08:14

## 2023-05-20 RX ADMIN — Medication 25 MILLIGRAM(S): at 05:12

## 2023-05-20 RX ADMIN — CHLORHEXIDINE GLUCONATE 1 APPLICATION(S): 213 SOLUTION TOPICAL at 13:28

## 2023-05-20 NOTE — PROGRESS NOTE ADULT - SUBJECTIVE AND OBJECTIVE BOX
24H events:    Patient is a 78y old Male who presents with a chief complaint of upper GI bleed (19 May 2023 18:28)    Primary diagnosis of Syncope       Today is hospital day 8d.      Patient seen and examined at bedside. Reports no active complaints.     PAST MEDICAL & SURGICAL HISTORY  Hypertension    CAD (coronary artery disease)    S/P CABG x 1    Unspecified atrial fibrillation    Chronic systolic congestive heart failure    Asthma      SOCIAL HISTORY:  Negative for smoking/alcohol/drug use.     ALLERGIES:  No Known Allergies    MEDICATIONS:  STANDING MEDICATIONS  atorvastatin 20 milliGRAM(s) Oral at bedtime  chlorhexidine 2% Cloths 1 Application(s) Topical daily  enoxaparin Injectable 100 milliGRAM(s) SubCutaneous every 12 hours  ferrous    sulfate 325 milliGRAM(s) Oral daily  furosemide    Tablet 20 milliGRAM(s) Oral daily  lactated ringers Bolus 500 milliLiter(s) IV Bolus once  metoprolol tartrate 25 milliGRAM(s) Oral three times a day  montelukast 10 milliGRAM(s) Oral daily  pantoprazole   Suspension 40 milliGRAM(s) Oral two times a day  sacubitril 24 mG/valsartan 26 mG 1 Tablet(s) Oral two times a day  tamsulosin 0.4 milliGRAM(s) Oral at bedtime    PRN MEDICATIONS  albuterol/ipratropium for Nebulization 3 milliLiter(s) Nebulizer every 6 hours PRN    VITALS:   T(F): 97.7  HR: 50  BP: 97/50  RR: 18  SpO2: 97%    LABS:                        9.1    4.60  )-----------( 249      ( 20 May 2023 07:23 )             30.1     05-20    145  |  105  |  12  ----------------------------<  102<H>  3.1<L>   |  25  |  1.1    Ca    8.1<L>      20 May 2023 07:23  Mg     2.0     05-20    TPro  6.2  /  Alb  3.9  /  TBili  1.4<H>  /  DBili  x   /  AST  17  /  ALT  11  /  AlkPhos  66  05-20                  RADIOLOGY:    PHYSICAL EXAM:  GENERAL:  NAD  SKIN: No rashes or lesions  HEENT: Atraumatic. Normocephalic.   NECK: Supple, No JVD.   PULMONARY: decreased breath sounds B/L. No wheezing.   CVS: Normal S1, S2. Rate and Rhythm are regular.   ABDOMEN/GI: Soft, Nontender, Nondistended   MSK:  No clubbing or cyanosis   NEUROLOGIC:  Moves all extremities   PSYCH: Awake and alert

## 2023-05-20 NOTE — DISCHARGE NOTE NURSING/CASE MANAGEMENT/SOCIAL WORK - PATIENT PORTAL LINK FT
You can access the FollowMyHealth Patient Portal offered by [facility] by registering at the following website: [email or web address]. By joining [facility] FollowMyHealth portal, you will also be able to view your health information using other applications (apps) compatible with our system.

## 2023-05-20 NOTE — PROGRESS NOTE ADULT - ASSESSMENT
H&P:  79yo M hx of afib on eliquis, HFrEF (unknown EF), CAD s/p CABG (8 years ago), HTN, asthma presented to the hospital for syncopal episode after feeling dizzy yesterday evening.  Patient speaks a dialect similar to Cantonese but is a village dialect. Patient is able to understand Cantonese, but 100%  will not be able to understand the patient's language. I understood 100% of patient's conversation.   Patient was feeling dizzy while walking to the bathroom at 10PM  followed by a fall. +LOC, + AC. Unsure if he had head trauma, but does not experience any headaches. Unwitnessed by family. Denies fevers, shortness of breath, chest pain, abdominal pain, diarrhea, constipation, dysuria.   Upon arrival to the ED, patient was complaining of having productive sputum needing to cough, epistaxis, and followed by ~50cc of hematemesis. Brought into hospital via EMS  At the ED, trauma alert was activated due to the fall. Dried blood found in the nares. VS was T-97.8F, BP: 97/54, HR: 71bpm, RR: 17bpm, SpO2: 96% on RA. After episode of hemoptysis, patient's SBP dropped from 89-->85. 2u prbc were ordered resulting in improvement of BP. Labs significant for microcytic anemia (Hb- 7.3), BUN- 33. troponin<0.01 x1.   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  CTA Abd+Pelvis: no acute pathology  Xray Pelvis: no acute fracture  Patient admitted to ICU for Upper GI bleed. Currently, VS stable: with SBP 110s. Given 2u prbc. started octrotide and protonix infusion. s/p ceftriaxone and erythromycin IV. given K centra 5g. Vascular surgery consulted for aortic dissection. No acute surgical intervention, Recommend keep -140 SBP.  per ED signout. GI consutled for GI Bleed. Plan for colonoscopy in today.       ICU COURSE:   Cardiology, CT surgery, and Vascular Surgery evaluated patient for aortic dissection, stated no acute surgical intervention, and consult GI for GIB. Pt received 2u RBC in ED. No further active bleeding since ED presentation. GI following, initially plan was for EGD, but given high cardiac risk, will not do EGD until patient is more stable given no active bleeding.  Patient started on esmolol drip to keep HR <70 and SBP <140 for aortic dissection (read as acute on CTA chest, but may be chronic). Started lopressor 25mg q8h, restarted home entresto, and started lasix 40mg qd (on 20mg qd at home). Since 5/16 early afternoon pt's HR and BP have been controlled off esmolol drip. Repeat CBC stable. Downgrade to stepdown approved by Dr. Florentino Mathews.     CEU course   patient remained stable in ceu off esmolol drip and hemoglobin stable- now dgtf       #Suspected Upper GI Bleed  #Hematemesis   #Acute blood loss anemia - resolved s/p 2u RBC  - pt presented with hematemesis, epistaxis, and fall associated with hypotension  - Hgb 7.3 received 2u RBC, repeat Hgb stable  - no further active bleeding since ED presentation  - GI following -- no EGD   - IV PPI BID  - CBC q12h, transfuse for Hgb < 7.0  - ferrous sulfate 325mg qd     #Unwitnessed fall  - trauma workup CTH, CT spine, CT A/P, XR pelvis negative   - 5/12 echo: EF 68%, mild-mod AR, mild MR, mild TR, mild pHTN  - fall precautions    #Acute type B aortic dissection (very short segment)  - CTA chest aorta w/wo IV contrast: very short segment acute type B aortic dissection 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 extending few cm from aortic arch, no evidence of acute traumatic injury, arterial extravasation  - trop negative   - Vascular surgery consulted: strict BP control, no acute surgery, hold all AC for GIB  - CT Surgery consulted, rec BP control, fluid resuscitation for hypovolemia; keep HR < 80  - Cardio consulted: start esmolol drip goal HR 50s, add nicardipine if remains hypertensive, hold AC  - keep SBP < 120, HR < 60  - off esmolol drip  - started lopressor 25mg q8h     #H/o HFrEF (unknown EF) with now recovered EF  #CAD s/p CABG   - at home is on lasix 20mg qd entresto 24-26mg qd, eplerenone 25mg qd  - restart entresto  - increased lasix to 40mg qd   - no fluids  - repeat 5/12 echo: EF 68%, mild-mod AR, mild MR, mild TR, mild pHTN  - statin    #Hypotension  - Due to multiple high dose anti hypertensives  - Will hold BP meds except metoprolol  - decrease metoprolol dose to 25mg q8  - will re evaluate       #Paroxysmal afib on eliquis  - not on any rate control at home  - start lopressor 50mg q8h  - esmolol drip to keep HR < 60 for aortic dissection -- wean  - hold AC for GIB  - duplex negative for DVT    #Hx of asthma  - not in exacerbation  - SpO2 98% on RA, no wheezing   - c/w home montelukast 10mg qd     Misc  - DVT ppx: SCDs  - GI ppx: IV PPI BID  - Diet:  low fiber soft bite sized   - Activity: IAT  - Code status: FULL.      Pending: bp improvement      H&P:  77yo M hx of afib on eliquis, HFrEF (unknown EF), CAD s/p CABG (8 years ago), HTN, asthma presented to the hospital for syncopal episode after feeling dizzy yesterday evening.  Patient speaks a dialect similar to Cantonese but is a village dialect. Patient is able to understand Cantonese, but 100%  will not be able to understand the patient's language. I understood 100% of patient's conversation.   Patient was feeling dizzy while walking to the bathroom at 10PM  followed by a fall. +LOC, + AC. Unsure if he had head trauma, but does not experience any headaches. Unwitnessed by family. Denies fevers, shortness of breath, chest pain, abdominal pain, diarrhea, constipation, dysuria.   Upon arrival to the ED, patient was complaining of having productive sputum needing to cough, epistaxis, and followed by ~50cc of hematemesis. Brought into hospital via EMS  At the ED, trauma alert was activated due to the fall. Dried blood found in the nares. VS was T-97.8F, BP: 97/54, HR: 71bpm, RR: 17bpm, SpO2: 96% on RA. After episode of hemoptysis, patient's SBP dropped from 89-->85. 2u prbc were ordered resulting in improvement of BP. Labs significant for microcytic anemia (Hb- 7.3), BUN- 33. troponin<0.01 x1.   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  CTA Abd+Pelvis: no acute pathology  Xray Pelvis: no acute fracture  Patient admitted to ICU for Upper GI bleed. Currently, VS stable: with SBP 110s. Given 2u prbc. started octrotide and protonix infusion. s/p ceftriaxone and erythromycin IV. given K centra 5g. Vascular surgery consulted for aortic dissection. No acute surgical intervention, Recommend keep -140 SBP.  per ED signout. GI consutled for GI Bleed. Plan for colonoscopy in today.       ICU COURSE:   Cardiology, CT surgery, and Vascular Surgery evaluated patient for aortic dissection, stated no acute surgical intervention, and consult GI for GIB. Pt received 2u RBC in ED. No further active bleeding since ED presentation. GI following, initially plan was for EGD, but given high cardiac risk, will not do EGD until patient is more stable given no active bleeding.  Patient started on esmolol drip to keep HR <70 and SBP <140 for aortic dissection (read as acute on CTA chest, but may be chronic). Started lopressor 25mg q8h, restarted home entresto, and started lasix 40mg qd (on 20mg qd at home). Since 5/16 early afternoon pt's HR and BP have been controlled off esmolol drip. Repeat CBC stable. Downgrade to stepdown approved by Dr. Florentino Mathews.     CEU course   patient remained stable in ceu off esmolol drip and hemoglobin stable- now dgtf       #Suspected Upper GI Bleed  #Hematemesis   #Acute blood loss anemia - resolved s/p 2u RBC  - pt presented with hematemesis, epistaxis, and fall associated with hypotension  - Hgb 7.3 received 2u RBC, repeat Hgb stable  - no further active bleeding since ED presentation  - GI following -- no EGD   - IV PPI BID  - CBC q12h, transfuse for Hgb < 7.0  - ferrous sulfate 325mg qd     #Unwitnessed fall  - trauma workup CTH, CT spine, CT A/P, XR pelvis negative   - 5/12 echo: EF 68%, mild-mod AR, mild MR, mild TR, mild pHTN  - fall precautions    #Acute type B aortic dissection (very short segment)  - CTA chest aorta w/wo IV contrast: very short segment acute type B aortic dissection 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 extending few cm from aortic arch, no evidence of acute traumatic injury, arterial extravasation  - trop negative   - Vascular surgery consulted: strict BP control, no acute surgery, hold all AC for GIB  - CT Surgery consulted, rec BP control, fluid resuscitation for hypovolemia; keep HR < 80  - Cardio consulted: start esmolol drip goal HR 50s, add nicardipine if remains hypertensive, hold AC  - keep SBP < 120, HR < 60  - off esmolol drip  - started lopressor 25mg q8h     #H/o HFrEF (unknown EF) with now recovered EF  #CAD s/p CABG   - at home is on lasix 20mg qd entresto 24-26mg qd, eplerenone 25mg qd  - restart entresto  - increased lasix to 40mg qd   - no fluids  - repeat 5/12 echo: EF 68%, mild-mod AR, mild MR, mild TR, mild pHTN  - statin    #Hypotension  - Due to multiple high dose anti hypertensives  - Will hold BP meds except metoprolol  - decrease metoprolol dose to 25mg q8  - will re evaluate   - improved      #Paroxysmal afib on eliquis  - not on any rate control at home  - start lopressor 50mg q8h  - esmolol drip to keep HR < 60 for aortic dissection -- wean  - hold AC for GIB  - duplex negative for DVT    #Hx of asthma  - not in exacerbation  - SpO2 98% on RA, no wheezing   - c/w home montelukast 10mg qd     Misc  - DVT ppx: SCDs  - GI ppx: IV PPI BID  - Diet:  low fiber soft bite sized   - Activity: IAT  - Code status: FULL.      Pending: bdischarge

## 2023-05-20 NOTE — DISCHARGE NOTE NURSING/CASE MANAGEMENT/SOCIAL WORK - NSDPADDLCOMM_GEN_ALL_CORE
Son/family/patient verified discharge education via teachback. Hospital translation services does not carry option for Pt's dilect of [place].   Grandson/family/patient verified discharge education via teach back. Hospital translation services does not carry option for Pt's dilect of [place].

## 2023-05-20 NOTE — PROGRESS NOTE ADULT - PROVIDER SPECIALTY LIST ADULT
Critical Care
Gastroenterology
Internal Medicine
MICU
Critical Care
Hospitalist
Internal Medicine
Critical Care
Critical Care
Gastroenterology
Hospitalist
Internal Medicine
MICU

## 2023-05-20 NOTE — PROGRESS NOTE ADULT - REASON FOR ADMISSION
upper GI bleed

## 2023-05-25 DIAGNOSIS — I48.20 CHRONIC ATRIAL FIBRILLATION, UNSPECIFIED: ICD-10-CM

## 2023-05-25 DIAGNOSIS — R04.0 EPISTAXIS: ICD-10-CM

## 2023-05-25 DIAGNOSIS — I11.0 HYPERTENSIVE HEART DISEASE WITH HEART FAILURE: ICD-10-CM

## 2023-05-25 DIAGNOSIS — Z95.1 PRESENCE OF AORTOCORONARY BYPASS GRAFT: ICD-10-CM
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
## 2023-05-25 DIAGNOSIS — J45.909 UNSPECIFIED ASTHMA, UNCOMPLICATED: ICD-10-CM
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
## 2023-05-25 DIAGNOSIS — Z87.891 PERSONAL HISTORY OF NICOTINE DEPENDENCE: ICD-10-CM

## 2023-05-25 DIAGNOSIS — D62 ACUTE POSTHEMORRHAGIC ANEMIA: ICD-10-CM

## 2023-05-25 DIAGNOSIS — I25.10 ATHEROSCLEROTIC HEART DISEASE OF NATIVE CORONARY ARTERY WITHOUT ANGINA PECTORIS: ICD-10-CM

## 2023-05-25 DIAGNOSIS — I95.9 HYPOTENSION, UNSPECIFIED: ICD-10-CM

## 2023-05-25 DIAGNOSIS — I48.0 PAROXYSMAL ATRIAL FIBRILLATION: ICD-10-CM

## 2023-05-25 DIAGNOSIS — I50.22 CHRONIC SYSTOLIC (CONGESTIVE) HEART FAILURE: ICD-10-CM

## 2023-05-25 DIAGNOSIS — I71.00 DISSECTION OF UNSPECIFIED SITE OF AORTA: ICD-10-CM

## 2023-05-25 DIAGNOSIS — Z79.01 LONG TERM (CURRENT) USE OF ANTICOAGULANTS: ICD-10-CM

## 2023-05-25 DIAGNOSIS — R55 SYNCOPE AND COLLAPSE: ICD-10-CM

## 2023-05-25 DIAGNOSIS — Z53.29 PROCEDURE AND TREATMENT NOT CARRIED OUT BECAUSE OF PATIENT'S DECISION FOR OTHER REASONS: ICD-10-CM

## 2023-05-25 DIAGNOSIS — K92.2 GASTROINTESTINAL HEMORRHAGE, UNSPECIFIED: ICD-10-CM

## 2023-10-19 NOTE — ED PROVIDER NOTE - IV ALTEPLASE EXCL ABS HIDDEN
Follow up with PCP. Take antibiotics as prescribed. Return to ED for new or worsening symptoms as discussed. show

## 2024-01-22 RX ORDER — MECLIZINE HCL 12.5 MG
1 TABLET ORAL
Refills: 0 | DISCHARGE

## 2024-01-22 RX ORDER — APIXABAN 2.5 MG/1
1 TABLET, FILM COATED ORAL
Refills: 0 | DISCHARGE

## 2024-01-22 RX ORDER — SACUBITRIL AND VALSARTAN 24; 26 MG/1; MG/1
1 TABLET, FILM COATED ORAL
Refills: 0 | DISCHARGE

## 2024-01-22 RX ORDER — MONTELUKAST 4 MG/1
1 TABLET, CHEWABLE ORAL
Refills: 0 | DISCHARGE

## 2024-01-22 RX ORDER — ATORVASTATIN CALCIUM 80 MG/1
1 TABLET, FILM COATED ORAL
Refills: 0 | DISCHARGE

## 2024-01-22 RX ORDER — EPLERENONE 50 MG/1
1 TABLET, FILM COATED ORAL
Refills: 0 | DISCHARGE

## 2024-01-22 RX ORDER — FUROSEMIDE 40 MG
1 TABLET ORAL
Refills: 0 | DISCHARGE

## 2024-01-23 PROBLEM — I71.00 DISSECTION OF UNSPECIFIED SITE OF AORTA: Chronic | Status: ACTIVE | Noted: 2018-09-14

## 2024-01-23 PROBLEM — I10 ESSENTIAL (PRIMARY) HYPERTENSION: Chronic | Status: ACTIVE | Noted: 2018-09-14

## 2024-05-27 ENCOUNTER — INPATIENT (INPATIENT)
Facility: HOSPITAL | Age: 79
LOS: 3 days | Discharge: HOME | DRG: 379 | End: 2024-05-31
Attending: INTERNAL MEDICINE | Admitting: HOSPITALIST
Payer: MEDICARE

## 2024-05-27 VITALS
DIASTOLIC BLOOD PRESSURE: 64 MMHG | HEART RATE: 58 BPM | WEIGHT: 210.1 LBS | OXYGEN SATURATION: 99 % | SYSTOLIC BLOOD PRESSURE: 119 MMHG | TEMPERATURE: 98 F | RESPIRATION RATE: 16 BRPM

## 2024-05-27 DIAGNOSIS — K92.2 GASTROINTESTINAL HEMORRHAGE, UNSPECIFIED: ICD-10-CM

## 2024-05-27 DIAGNOSIS — Z98.890 OTHER SPECIFIED POSTPROCEDURAL STATES: Chronic | ICD-10-CM

## 2024-05-27 PROBLEM — I48.91 UNSPECIFIED ATRIAL FIBRILLATION: Chronic | Status: ACTIVE | Noted: 2023-05-12

## 2024-05-27 PROBLEM — J45.909 UNSPECIFIED ASTHMA, UNCOMPLICATED: Chronic | Status: ACTIVE | Noted: 2023-05-12

## 2024-05-27 PROBLEM — I10 ESSENTIAL (PRIMARY) HYPERTENSION: Chronic | Status: ACTIVE | Noted: 2023-05-12

## 2024-05-27 PROBLEM — I25.10 ATHEROSCLEROTIC HEART DISEASE OF NATIVE CORONARY ARTERY WITHOUT ANGINA PECTORIS: Chronic | Status: ACTIVE | Noted: 2023-05-12

## 2024-05-27 PROBLEM — Z95.1 PRESENCE OF AORTOCORONARY BYPASS GRAFT: Chronic | Status: ACTIVE | Noted: 2023-05-12

## 2024-05-27 PROBLEM — I50.22 CHRONIC SYSTOLIC (CONGESTIVE) HEART FAILURE: Chronic | Status: ACTIVE | Noted: 2023-05-12

## 2024-05-27 LAB
ALBUMIN SERPL ELPH-MCNC: 4.2 G/DL — SIGNIFICANT CHANGE UP (ref 3.5–5.2)
ALP SERPL-CCNC: 39 U/L — SIGNIFICANT CHANGE UP (ref 30–115)
ALT FLD-CCNC: 9 U/L — SIGNIFICANT CHANGE UP (ref 0–41)
ANION GAP SERPL CALC-SCNC: 10 MMOL/L — SIGNIFICANT CHANGE UP (ref 7–14)
APPEARANCE UR: ABNORMAL
APTT BLD: 29.8 SEC — SIGNIFICANT CHANGE UP (ref 27–39.2)
AST SERPL-CCNC: 12 U/L — SIGNIFICANT CHANGE UP (ref 0–41)
BASOPHILS # BLD AUTO: 0.04 K/UL — SIGNIFICANT CHANGE UP (ref 0–0.2)
BASOPHILS NFR BLD AUTO: 0.9 % — SIGNIFICANT CHANGE UP (ref 0–1)
BILIRUB SERPL-MCNC: 0.6 MG/DL — SIGNIFICANT CHANGE UP (ref 0.2–1.2)
BILIRUB UR-MCNC: NEGATIVE — SIGNIFICANT CHANGE UP
BUN SERPL-MCNC: 24 MG/DL — HIGH (ref 10–20)
CALCIUM SERPL-MCNC: 8.1 MG/DL — LOW (ref 8.4–10.5)
CHLORIDE SERPL-SCNC: 105 MMOL/L — SIGNIFICANT CHANGE UP (ref 98–110)
CO2 SERPL-SCNC: 24 MMOL/L — SIGNIFICANT CHANGE UP (ref 17–32)
COLOR SPEC: YELLOW — SIGNIFICANT CHANGE UP
CREAT SERPL-MCNC: 1.1 MG/DL — SIGNIFICANT CHANGE UP (ref 0.7–1.5)
DIFF PNL FLD: NEGATIVE — SIGNIFICANT CHANGE UP
EGFR: 69 ML/MIN/1.73M2 — SIGNIFICANT CHANGE UP
EOSINOPHIL # BLD AUTO: 0.21 K/UL — SIGNIFICANT CHANGE UP (ref 0–0.7)
EOSINOPHIL NFR BLD AUTO: 4.3 % — SIGNIFICANT CHANGE UP (ref 0–8)
GLUCOSE SERPL-MCNC: 102 MG/DL — HIGH (ref 70–99)
GLUCOSE UR QL: NEGATIVE MG/DL — SIGNIFICANT CHANGE UP
HCT VFR BLD CALC: 22.9 % — LOW (ref 42–52)
HGB BLD-MCNC: 6.9 G/DL — CRITICAL LOW (ref 14–18)
INR BLD: 1.1 RATIO — SIGNIFICANT CHANGE UP (ref 0.65–1.3)
KETONES UR-MCNC: NEGATIVE MG/DL — SIGNIFICANT CHANGE UP
LACTATE SERPL-SCNC: 2.5 MMOL/L — HIGH (ref 0.7–2)
LEUKOCYTE ESTERASE UR-ACNC: NEGATIVE — SIGNIFICANT CHANGE UP
LIDOCAIN IGE QN: 51 U/L — SIGNIFICANT CHANGE UP (ref 7–60)
LYMPHOCYTES # BLD AUTO: 0.67 K/UL — LOW (ref 1.2–3.4)
LYMPHOCYTES # BLD AUTO: 13.9 % — LOW (ref 20.5–51.1)
MCHC RBC-ENTMCNC: 21.1 PG — LOW (ref 27–31)
MCHC RBC-ENTMCNC: 29.7 G/DL — LOW (ref 32–37)
MCV RBC AUTO: 71.1 FL — LOW (ref 80–94)
MONOCYTES # BLD AUTO: 0.46 K/UL — SIGNIFICANT CHANGE UP (ref 0.1–0.6)
MONOCYTES NFR BLD AUTO: 9.6 % — HIGH (ref 1.7–9.3)
NEUTROPHILS # BLD AUTO: 3.44 K/UL — SIGNIFICANT CHANGE UP (ref 1.4–6.5)
NEUTROPHILS NFR BLD AUTO: 71.3 % — SIGNIFICANT CHANGE UP (ref 42.2–75.2)
NITRITE UR-MCNC: NEGATIVE — SIGNIFICANT CHANGE UP
NRBC # BLD: SIGNIFICANT CHANGE UP /100 WBCS (ref 0–0)
PH UR: 6.5 — SIGNIFICANT CHANGE UP (ref 5–8)
PLATELET # BLD AUTO: 189 K/UL — SIGNIFICANT CHANGE UP (ref 130–400)
PMV BLD: 9.5 FL — SIGNIFICANT CHANGE UP (ref 7.4–10.4)
POTASSIUM SERPL-MCNC: 4 MMOL/L — SIGNIFICANT CHANGE UP (ref 3.5–5)
POTASSIUM SERPL-SCNC: 4 MMOL/L — SIGNIFICANT CHANGE UP (ref 3.5–5)
PROT SERPL-MCNC: 6.2 G/DL — SIGNIFICANT CHANGE UP (ref 6–8)
PROT UR-MCNC: 30 MG/DL
PROTHROM AB SERPL-ACNC: 12.6 SEC — SIGNIFICANT CHANGE UP (ref 9.95–12.87)
RBC # BLD: 3.22 M/UL — LOW (ref 4.7–6.1)
RBC # FLD: 20.5 % — HIGH (ref 11.5–14.5)
SODIUM SERPL-SCNC: 139 MMOL/L — SIGNIFICANT CHANGE UP (ref 135–146)
SP GR SPEC: >1.03 — HIGH (ref 1–1.03)
TROPONIN T, HIGH SENSITIVITY RESULT: 14 NG/L — SIGNIFICANT CHANGE UP (ref 6–21)
UROBILINOGEN FLD QL: 1 MG/DL — SIGNIFICANT CHANGE UP (ref 0.2–1)
WBC # BLD: 4.82 K/UL — SIGNIFICANT CHANGE UP (ref 4.8–10.8)
WBC # FLD AUTO: 4.82 K/UL — SIGNIFICANT CHANGE UP (ref 4.8–10.8)

## 2024-05-27 PROCEDURE — 71045 X-RAY EXAM CHEST 1 VIEW: CPT | Mod: 26

## 2024-05-27 PROCEDURE — 88312 SPECIAL STAINS GROUP 1: CPT

## 2024-05-27 PROCEDURE — P9040: CPT

## 2024-05-27 PROCEDURE — 83550 IRON BINDING TEST: CPT

## 2024-05-27 PROCEDURE — 81001 URINALYSIS AUTO W/SCOPE: CPT

## 2024-05-27 PROCEDURE — 74177 CT ABD & PELVIS W/CONTRAST: CPT | Mod: 26,MC

## 2024-05-27 PROCEDURE — 85610 PROTHROMBIN TIME: CPT

## 2024-05-27 PROCEDURE — 85027 COMPLETE CBC AUTOMATED: CPT

## 2024-05-27 PROCEDURE — P9016: CPT

## 2024-05-27 PROCEDURE — 76705 ECHO EXAM OF ABDOMEN: CPT

## 2024-05-27 PROCEDURE — 83540 ASSAY OF IRON: CPT

## 2024-05-27 PROCEDURE — 36415 COLL VENOUS BLD VENIPUNCTURE: CPT

## 2024-05-27 PROCEDURE — 80053 COMPREHEN METABOLIC PANEL: CPT

## 2024-05-27 PROCEDURE — 93010 ELECTROCARDIOGRAM REPORT: CPT

## 2024-05-27 PROCEDURE — 86880 COOMBS TEST DIRECT: CPT

## 2024-05-27 PROCEDURE — 86850 RBC ANTIBODY SCREEN: CPT

## 2024-05-27 PROCEDURE — 85025 COMPLETE CBC W/AUTO DIFF WBC: CPT

## 2024-05-27 PROCEDURE — 86901 BLOOD TYPING SEROLOGIC RH(D): CPT

## 2024-05-27 PROCEDURE — 99291 CRITICAL CARE FIRST HOUR: CPT

## 2024-05-27 PROCEDURE — 82728 ASSAY OF FERRITIN: CPT

## 2024-05-27 PROCEDURE — 83735 ASSAY OF MAGNESIUM: CPT

## 2024-05-27 PROCEDURE — 86900 BLOOD TYPING SEROLOGIC ABO: CPT

## 2024-05-27 PROCEDURE — 88305 TISSUE EXAM BY PATHOLOGIST: CPT

## 2024-05-27 PROCEDURE — 72125 CT NECK SPINE W/O DYE: CPT | Mod: 26,MC

## 2024-05-27 PROCEDURE — 86870 RBC ANTIBODY IDENTIFICATION: CPT

## 2024-05-27 PROCEDURE — 84100 ASSAY OF PHOSPHORUS: CPT

## 2024-05-27 PROCEDURE — 83605 ASSAY OF LACTIC ACID: CPT

## 2024-05-27 PROCEDURE — 99223 1ST HOSP IP/OBS HIGH 75: CPT

## 2024-05-27 PROCEDURE — 70450 CT HEAD/BRAIN W/O DYE: CPT | Mod: 26,MC

## 2024-05-27 PROCEDURE — 71260 CT THORAX DX C+: CPT | Mod: 26,MC

## 2024-05-27 PROCEDURE — 36430 TRANSFUSION BLD/BLD COMPNT: CPT

## 2024-05-27 PROCEDURE — 97162 PT EVAL MOD COMPLEX 30 MIN: CPT | Mod: GP

## 2024-05-27 PROCEDURE — 86922 COMPATIBILITY TEST ANTIGLOB: CPT

## 2024-05-27 PROCEDURE — 84466 ASSAY OF TRANSFERRIN: CPT

## 2024-05-27 RX ORDER — PANTOPRAZOLE SODIUM 20 MG/1
8 TABLET, DELAYED RELEASE ORAL
Qty: 80 | Refills: 0 | Status: DISCONTINUED | OUTPATIENT
Start: 2024-05-27 | End: 2024-05-28

## 2024-05-27 RX ORDER — PANTOPRAZOLE SODIUM 20 MG/1
40 TABLET, DELAYED RELEASE ORAL ONCE
Refills: 0 | Status: COMPLETED | OUTPATIENT
Start: 2024-05-27 | End: 2024-05-27

## 2024-05-27 RX ORDER — TAMSULOSIN HYDROCHLORIDE 0.4 MG/1
1 CAPSULE ORAL
Refills: 0 | DISCHARGE

## 2024-05-27 RX ORDER — FERROUS FUMARATE 350(115)MG
1 TABLET ORAL
Refills: 0 | DISCHARGE

## 2024-05-27 RX ORDER — MONTELUKAST 4 MG/1
10 TABLET, CHEWABLE ORAL DAILY
Refills: 0 | Status: DISCONTINUED | OUTPATIENT
Start: 2024-05-27 | End: 2024-05-31

## 2024-05-27 RX ORDER — TAMSULOSIN HYDROCHLORIDE 0.4 MG/1
0.4 CAPSULE ORAL AT BEDTIME
Refills: 0 | Status: DISCONTINUED | OUTPATIENT
Start: 2024-05-27 | End: 2024-05-31

## 2024-05-27 RX ORDER — ALBUTEROL 90 UG/1
2 AEROSOL, METERED ORAL EVERY 6 HOURS
Refills: 0 | Status: DISCONTINUED | OUTPATIENT
Start: 2024-05-27 | End: 2024-05-31

## 2024-05-27 RX ORDER — LANOLIN ALCOHOL/MO/W.PET/CERES
3 CREAM (GRAM) TOPICAL AT BEDTIME
Refills: 0 | Status: DISCONTINUED | OUTPATIENT
Start: 2024-05-27 | End: 2024-05-31

## 2024-05-27 RX ORDER — ACETAMINOPHEN 500 MG
650 TABLET ORAL EVERY 6 HOURS
Refills: 0 | Status: DISCONTINUED | OUTPATIENT
Start: 2024-05-27 | End: 2024-05-31

## 2024-05-27 RX ORDER — ATORVASTATIN CALCIUM 80 MG/1
20 TABLET, FILM COATED ORAL AT BEDTIME
Refills: 0 | Status: DISCONTINUED | OUTPATIENT
Start: 2024-05-27 | End: 2024-05-31

## 2024-05-27 RX ORDER — SACUBITRIL AND VALSARTAN 24; 26 MG/1; MG/1
1 TABLET, FILM COATED ORAL
Refills: 0 | Status: DISCONTINUED | OUTPATIENT
Start: 2024-05-27 | End: 2024-05-31

## 2024-05-27 RX ADMIN — PANTOPRAZOLE SODIUM 40 MILLIGRAM(S): 20 TABLET, DELAYED RELEASE ORAL at 16:33

## 2024-05-27 RX ADMIN — TAMSULOSIN HYDROCHLORIDE 0.4 MILLIGRAM(S): 0.4 CAPSULE ORAL at 22:00

## 2024-05-27 RX ADMIN — ATORVASTATIN CALCIUM 20 MILLIGRAM(S): 80 TABLET, FILM COATED ORAL at 22:00

## 2024-05-27 RX ADMIN — PANTOPRAZOLE SODIUM 10 MG/HR: 20 TABLET, DELAYED RELEASE ORAL at 16:59

## 2024-05-27 NOTE — ED ADULT NURSE NOTE - NSFALLHARMRISKINTERV_ED_ALL_ED

## 2024-05-27 NOTE — H&P ADULT - CONVERSATION DETAILS
Due to language barrier, had difficulty with full conversation with pt. Was able to confirm w pt (with son in law's help) that in event that he is unable to have conversations w medical team to make decisions, his wife would be the most appropriate decision maker/HCP. Pt accepts his daughters as alternative HCP.     Per the son in law, pt has not previously discussed measures such as intubation and CPR. States he will bring this up with the family to further discuss. Also introduced the idea to son that if pt continues to have recurrent bleeding events on Eliquis or any blood thinner being used to treat AF, options are to continue w anticoagulation w the bleeding risk and potential readmissions, versus discontinuing anticoagulation and accepting risk of stroke. Son in law states he will also discuss this with family as a whole.    HCP wife Wisam De La Cruz (spelling?) [place] speaking [phone]  Daughter Courtney Hemphill Christine and Cantonese speaking [phone]   Daughter Teresa Hemphill Hajeana, Mandarin, and some English speaking [phone]   Son in law Steffi Robert [place] and Mandarin, some Cantonese, some English speaking [phone]

## 2024-05-27 NOTE — H&P ADULT - NSHPPHYSICALEXAM_GEN_ALL_CORE
GENERAL: NAD, lying in bed comfortably. Awake, alert, conversational.   HEAD:  Atraumatic, Normocephalic  EYES: EOMI, sclera clear  ENT: Moist mucous membranes  NECK: Supple, trachea midline  CHEST/LUNG: Clear to auscultation anteriorly bilaterally; No rales, rhonchi, wheezing, or rubs. Unlabored respirations  HEART: Regular rate, Irregularly irregular rhythm, loud 4/6 ?AS vs AR murmur.   ABDOMEN: (+)BS; Soft, nontender, nondistended  EXTREMITIES:  2+ Radial Pulses, brisk capillary refill. No clubbing, cyanosis, or edema  NERVOUS SYSTEM:  A&Ox3, no noted facial droop. Moving all extremities w/o difficulty.   SKIN: No rashes or lesions to b/l forearm, face.

## 2024-05-27 NOTE — H&P ADULT - NSICDXPASTMEDICALHX_GEN_ALL_CORE_FT
PAST MEDICAL HISTORY:  Aortic dissection     Asthma     CAD (coronary artery disease)     Chronic systolic congestive heart failure     HTN (hypertension)     Hypertension     S/P CABG x 1     Unspecified atrial fibrillation      [Mediport] : Mediport [No focal deficits] : no focal deficits [Normal] : affect appropriate [de-identified] : calm, peaceful, content [de-identified] : site without redness or tenderness, healed nicely, 1 small remnant of a stitch at left edge [de-identified] : circumcised, skin pink and clean. healed nicely.  [de-identified] : right lower back/upper buttock with firm mass with softness around the edges still centrally firm [de-identified] : dark discoloration and toughening of the skin overlying the tumor, there still remains 2 tiny pieces of stitches at biopsy site-but not infected and site healed nicely. rash on face does seem to have spread, more lesions spread over more of his face and also diffusely on his neck to his clavicles. I also noticed some scattered lesions on his legs. no open areas seen on buttocks. area of concern by anus has some almost raw looking skin but not open

## 2024-05-27 NOTE — ED PROVIDER NOTE - CARE PLAN
Principal Discharge DX:	GIB (gastrointestinal bleeding)  Secondary Diagnosis:	Thoracic aortic aneurysm   1 Principal Discharge DX:	GIB (gastrointestinal bleeding)  Secondary Diagnosis:	Thoracic aortic aneurysm  Secondary Diagnosis:	Fall

## 2024-05-27 NOTE — ED PROVIDER NOTE - CRITICAL CARE ATTENDING CONTRIBUTION TO CARE
This was a shared visit with the KELI. I reviewed and verified the documentation and independently performed the documented: History, Exam and Medical Decision Making.    I have reviewed and agree with the mid-level note, except as documented in my note below.    77 y/o male h/o HTN, CAD s/p CABG, CHF, afib on eliquis, aortic dissection, GIB, s/p fall 2 days ago, reports dizziness / light-headedness and dark stools, denies head trauma, LOC, paresthesias, hematemesis, coffee grounds, BRBPR, anorexia, fever, weakness, palpitations, near syncope or syncope, SOB, n/v/d, hematemesis,  bleeding or other associated injuries or complaints at present. Old chart reviewed. I have reviewed and agree with the initial nursing note, except as documented in my note.    VSS, awake, alert, Head NC / NT, PERRL / EOMI, no dental injury, no abrasions or lacerations, chest CTAB, chest wall NT, no w/r/r, +S1/S2, RRR, no m/r/g, abdomen soft, NT, ND, +BS, able to voluntarily actively rotate neck 45 degrees left and right on request, no midline spinal tenderness, no CVA tenderness, FROM x 4, no bony tenderness, alert and oriented to person, place and time, clear speech, cranial nerves II-XII are intact.

## 2024-05-27 NOTE — H&P ADULT - HISTORY OF PRESENT ILLNESS
History was limited by pt's dialect and language barrier - pt does NOT speak Cantonese. Speaks [place]ak[place] dialect which there are few or no translators for. Best scenario is to have family assist with translating - family speaks Hakka as well as more common dialects including Cantonese and Mandarin. They will be listed below.     [place]akka speaking 78 y/o man w PMHx of HTN, CAD, s/p CABG 2015, AF on Eliquis, HFimpEF, severe LAE/EARL, mi-mod aortic regurg, h/o aortic dissection s/p repair, h/o GIB most recently an ICU admission in 2023 during which he ultimately did not have EGD, 2 days ago had syncopal episode w unclear downtime, since then has had melena and worsening weakness/lightheadedness and so presented to the ED where he was found to have Hb 6.9 from discharge value 9.1, ordered for 1uPRBC, pantoprazole drip, admitted to medicine for GIB.   HCP wife Wisam De La Cruz (spelling?) [place] speaking [phone]  Daughter Courtney King and Cantonese speaking [phone]   Daughter Teresa Hemphill Hahiral, Mandarin, and some English speaking [phone]   Son in law Steffi Jones [place] and Mandarin, some Cantonese, some English speaking [phone]    Pt is able to relay that he had syncope 2 days ago (5/25/24) and clarifies that he did not trip/fall. Is unsure how long he was passed out for. Subsequently has been off Eliquis and some of his medications but specifies he continued to take Omeprazole QD. On day of admission is mostly experiencing dizziness and weakness, but did not have any episode of melenas. Mild epigastric discomfort/pain.   ROS: No fevers, chills, dysuria, constipation, diarrhea, abd pain. No confusion.     Triage vitals were: 119/64, 58bpm, RR16, 99% on RA, 98.2  Repeat vitals notable for: 131/62, 64bpm   Labs notable for: Hb 6.9 from discharge value 9.1, trop 14, lactate 2.5  Imaging: Had CT Head, C spine, Chest, Abd/Pelvis most notable for stable aortic dissection.   Treatments: ordered for 1uPRBC, pantoprazole drip  Pending: At time of admission had not received blood transfusion

## 2024-05-27 NOTE — H&P ADULT - HIV OFFER
Opt out
Other (Free Text): Discussed 8 units in FH, 9 in [place]
Detail Level: Zone
Render Risk Assessment In Note?: no
Note Text (......Xxx Chief Complaint.): This diagnosis correlates with the

## 2024-05-27 NOTE — H&P ADULT - ATTENDING COMMENTS
Patient seen and examined at bedside independently of the residents. I read the resident's note and agree with the plan with the additions and corrections as noted below. My note supersedes the resident's note.     REVIEW OF SYSTEMS:  Negative except in HPI.     PMH: CAD s/p CABG, Paroxysmal A.fib (eliquis), HFimpEF, severe LAE/EARL, mi-mod aortic regurg, h/o aortic dissection s/p repair, h/o GIB    FHx: Reviewed. No fhx of asthma/copd, No fhx of liver and pulmonary disease. No fhx of hematological disorder.     Physical Exam:  GEN: No acute distress. Awake, Alert and oriented x 3.   Head: Atraumatic, Normocephalic.   Eye: PEERLA. No sclera icterus. EOMI.   ENT: Normal oropharynx, no thyromegaly, no mass, no lymphadenopathy.   LUNGS: Clear to auscultation bilaterally. No wheeze/rales/crackles.   HEART: Normal. S1/S2 present. RRR. No murmur/gallops.   ABD: Soft, non-tender, non-distended. Bowel sounds present. JESSICA + melena   EXT: No pitting edema. No erythema. No tenderness.  Integumentary: No rash, No sore, No petechia.   NEURO: CN III-XII intact. Strength: 5/5 b/l ULE. Sensory intact b/l ULE.     Vital Signs Last 24 Hrs  T(C): 36.4 (27 May 2024 20:06), Max: 36.8 (27 May 2024 14:52)  T(F): 97.6 (27 May 2024 20:06), Max: 98.2 (27 May 2024 14:52)  HR: 82 (27 May 2024 20:06) (58 - 82)  BP: 143/78 (27 May 2024 20:06) (119/64 - 143/78)  BP(mean): --  RR: 16 (27 May 2024 20:06) (16 - 16)  SpO2: 97% (27 May 2024 20:06) (97% - 99%)    Parameters below as of 27 May 2024 20:06  Patient On (Oxygen Delivery Method): room air      Please see the above notes for Labs and radiology.     Assessment and Plan:     78 yo M with hx of CAD s/p CABG, Paroxysmal A.fib (eliquis), HFimpEF, severe LAE/EARL, mi-mod aortic regurg, h/o aortic dissection s/p repair, h/o GIB most recently an ICU admission in  during which he ultimately did not have EGD, 2 days ago had syncopal episode w unclear downtime, since then has had melena and worsening weakness/lightheadedness and so presented to the ED where he was found to have Hb 6.9.    Acute on chronic anemia likely 2/2 suspected UGIB  - Hb 6.9 currently. Baseline Hb~ 9 in .   - CT abdomen: neg for acute GIB. No evidence of hematoma.  - JESSICA + melena. Last BM this morning as per patient.   - s/p 1u PRBC given in ED.   - c/w PPI drip for now.   - NPO for now.   - large bore IV lines x 2.   -  monitor CBC and transfuse if Hb < 8.   - GI consult.     Recent syncope likely 2/2 GIB   - Trauma w/u unremarkable.   - s/p 1u PRBC   - TTE in May 2023: LVEF 68%.   - check orthostatic VS   - monitor on Telemetry.     Hx of Aortic dissection  - CT abdomen shows: Stable short segment aortic dissection originating immediately after the left subclavian artery takeoff from the aortic arch extending only a few centimeters along the aortic arch. Dissection flap does not extend into the descending thoracic aorta or into any branch vessels.  Mild increase in diameter of the descending thoracic aorta with no involvement of dissection.   - Close interval follow-up recommended.    CAD s/p CABG - c/w home med.   Paroxysmal A.fib - Hold eliquis (as per patient, he last took eliquis about 2 weeks ago as his PMD told him to stop taking it).   HFimpEF - c/w home med.     DVT ppx: SCD  GI ppx: PPI  Diet: NPO   Activity: as tolerated.     Date seen by the attendin2024  Total time spent: 75 minutes.

## 2024-05-27 NOTE — ED PROVIDER NOTE - NSICDXPASTMEDICALHX_GEN_ALL_CORE_FT
PAST MEDICAL HISTORY:  Aortic dissection     Asthma     CAD (coronary artery disease)     Chronic systolic congestive heart failure     HTN (hypertension)     Hypertension     S/P CABG x 1     Unspecified atrial fibrillation

## 2024-05-27 NOTE — H&P ADULT - ASSESSMENT
pt does NOT speak Cantonese. Speaks [place]ak[place] dialect which there are few or no translators for. Best scenario is to have family assist with translating - family speaks Hakka and more common Chinese dialects.     Hakka speaking 78 y/o man w PMHx of HTN, CAD, s/p CABG 2015, AF on Eliquis, HFimpEF, severe LAE/EARL, mi-mod aortic regurg, h/o aortic dissection s/p repair, h/o GIB most recently an ICU admission in 2023 during which he ultimately did not have EGD, 2 days ago had syncopal episode w unclear downtime, since then has had melena and worsening weakness/lightheadedness and so presented to the ED where he was found to have Hb 6.9 from discharge value 9.1, ordered for 1uPRBC, pantoprazole drip, admitted to medicine for GIB.     HCP wife Wisam De La Cruz (spelling?) [place]akka speaking [phone]  Daughter Courtney King and Cantonese speaking [phone]   Daughter Teresa Hemphill [place], Mandarin, and some English speaking [phone]   Son in law Steffi Jones [place] and Mandarin, some Cantonese, some English speaking [phone]  Med rec done with pt's meds at bedside. Pharmacy is [facility] [phone]      Triage vitals were: 119/64, 58bpm, RR16, 99% on RA, 98.2  Repeat vitals notable for: 131/62, 64bpm   Labs notable for: Hb 6.9 from discharge value 9.1, trop 14, lactate 2.5  Imaging: Had CT Head, C spine, Chest, Abd/Pelvis most notable for stable aortic dissection.   Treatments: ordered for 1uPRBC, pantoprazole drip  Pending: At time of admission had not received blood transfusion       #GIB  #Syncope   DDx most likely upper GIB w syncope due to GIB. No neuro deficits noted by family and pt had pan CT w no acute findings. Has stopped taking Eliquis x2days.   h/o GIB most recently an ICU admission in 2023 during which he ultimately did not have EGD  - Hb 6.9 from discharge value 9.1, goal Hb 8 given h/o HF   - 1uPRBC ordered from ED, not yet given  - lactate 2.5, pending repeat   - Pantoprazole drip   - NPO overnight   - if continues to have melena and dropping Hb, consult GI   - if no further melena can likely advance diet     #AF on Eliquis  Rate controlled   - Hold eliquis while evaluating and assessing GIB   - Continue conversation with family re: desire to restart AC   - No home rate control meds such as CCB or BB     #HTN  #CAD, s/p CABG 2015  #HFimpEF, severe LAE/EARL, mi-mod aortic regurg,   #h/o aortic dissection s/p repair  Trop 14 on admission  CT Chest Abd/Pelvis noted essentially stable dissection and pt w/o acute back/abd pain.   Home meds to continue:   - Entresto 24-26mg   - Atorvastatin 20mg                                                                               ----------------------------------------------------  # DVT prophylaxis: N/A   # GI prophylaxis: Pantoprazole infusion   # Diet: NPO for now   # Activity: Ambulate w assistance   # Code status: FULL CODE   # Disposition: Admit to 3C                                                                           --------------------------------------------------------    # Handoff:   - f/u Hb trend   - f/u lactate  - continue conversation surrounding bleed risk and restarting AC   - if continues to have melena and dropping Hb, consult GI   - if no further melena can likely advance diet  pt does NOT speak Cantonese. Speaks [place] dialect which there are few or no translators for. Best scenario is to have family assist with translating - family speaks [place] and more common Chinese dialects.     Hakka speaking 78 y/o man w PMHx of HTN, CAD, s/p CABG 2015, AF on Eliquis, HFimpEF, severe LAE/EARL, mi-mod aortic regurg, h/o aortic dissection s/p repair, h/o GIB most recently an ICU admission in 2023 during which he ultimately did not have EGD, 2 days ago had syncopal episode w unclear downtime, since then has had melena and worsening weakness/lightheadedness and so presented to the ED where he was found to have Hb 6.9 from discharge value 9.1, ordered for 1uPRBC, pantoprazole drip, admitted to medicine for GIB.     HCP wife Wisam De La Cruz (spelling?) [place]ak[place] speaking [phone]  Daughter Courtney King and Cantonese speaking [phone]   Daughter Teresa Hemphill [place], Mandarin, and some English speaking [phone]   Son in law Steffi Jones H[place] and Mandarin, some Cantonese, some English speaking [phone]  Med rec done with pt's meds at bedside. Pharmacy is [facility] [phone]    #GIB  #Syncope   DDx most likely upper GIB w syncope due to GIB. No neuro deficits noted by family and pt had pan CT w no acute findings. Has stopped taking Eliquis x2days.   h/o GIB most recently an ICU admission in 2023 during which he ultimately did not have EGD  - Hb 6.9 from discharge value 9.1, goal Hb 8 given h/o HF   - 1uPRBC ordered from ED, not yet given  - lactate 2.5, pending repeat   - Pantoprazole drip   - NPO overnight   - if continues to have melena and dropping Hb, consult GI   - if no further melena can likely advance diet     #AF on Eliquis  Rate controlled   - Hold eliquis while evaluating and assessing GIB   - Continue conversation with family re: desire to restart AC   - No home rate control meds such as CCB or BB     #HTN  #CAD, s/p CABG 2015  #HFimpEF, severe LAE/EARL, mi-mod aortic regurg,   #h/o aortic dissection s/p repair  Trop 14 on admission  CT Chest Abd/Pelvis noted essentially stable dissection and pt w/o acute back/abd pain.   Home meds to continue:   - Entresto 24-26mg   - Atorvastatin 20mg                                                                               ----------------------------------------------------  # DVT prophylaxis: N/A   # GI prophylaxis: Pantoprazole infusion   # Diet: NPO for now   # Activity: Ambulate w assistance   # Code status: FULL CODE   # Disposition: Admit to 3C                                                                           --------------------------------------------------------    # Handoff:   - f/u Hb trend   - f/u lactate  - continue conversation surrounding bleed risk and restarting AC   - if continues to have melena and dropping Hb, consult GI   - if no further melena can likely advance diet

## 2024-05-27 NOTE — ED PROVIDER NOTE - ATTENDING APP SHARED VISIT CONTRIBUTION OF CARE
I have reviewed and agree with the mid-level note, except as documented in my note below.    79 y/o male h/o HTN, CAD s/p CABG, CHF, afib on eliquis, aortic dissection, GIB, s/p fall 2 days ago, reports dizziness / light-headedness and dark stools, denies head trauma, LOC, paresthesias, hematemesis, coffee grounds, BRBPR, anorexia, fever, weakness, palpitations, near syncope or syncope, SOB, n/v/d, hematemesis,  bleeding or other associated injuries or complaints at present. Old chart reviewed. I have reviewed and agree with the initial nursing note, except as documented in my note.    VSS, awake, alert, Head NC / NT, PERRL / EOMI, no dental injury, no abrasions or lacerations, chest CTAB, chest wall NT, no w/r/r, +S1/S2, RRR, no m/r/g, abdomen soft, NT, ND, +BS, able to voluntarily actively rotate neck 45 degrees left and right on request, no midline spinal tenderness, no CVA tenderness, FROM x 4, no bony tenderness, alert and oriented to person, place and time, clear speech, cranial nerves II-XII are intact.

## 2024-05-27 NOTE — ED PROVIDER NOTE - CLINICAL SUMMARY MEDICAL DECISION MAKING FREE TEXT BOX
Independent interpretation of the EKG performed by MD Mclean  Independent interpretation of the X-Ray film(s) performed by MD Mclean    (1) GIB; This patient presents with symptoms concerning for a GI bleed. Differential diagnoses includes peptic ulcer disease, versus gastritis/gastric ulcer, versus possible AVM. Presentation not consistent with esophageal or gastric variceal bleeding or Boerhaave’s syndrome. Presentation not consistent with other etiologies upper GI bleeding at this time. No red flag features or high risk bleeding. No evidence of hemorrhagic shock. DDx also includes diverticulitis (most common cause) versus hemorrhoids. Less likely etiologies include angiodysplasia, cancer, IBD. Presentation not consistent with mesenteric ischemia or ischemic colitis, brisk or acute life threatening upper GIB as patient has no evidence of hemorrhagic shock.    (2) FALL; Given work up, exam, and history low suspicion for intracranial hemorrhage or trauma, carotid or vertebral artery dissection, intrathoracic trauma (pulmonary contusion, blunt cardiac trauma, pneumothorax, hemothorax, cardiac tamponade, rib fractures), intra abdominal trauma (no liver, spleen, or renal lacerations, doubt hollow viscus injury given soft abdomen on repeat exams, no free air seen, consistently normotensive), extremity fracture, extremity dislocation, compartment syndrome.

## 2024-05-27 NOTE — ED ADULT TRIAGE NOTE - CHIEF COMPLAINT QUOTE
Fall x2 this weekend, feels dizzy, has bloody stool all since this weekend. Fall x2 this weekend, feels dizzy, has bloody stool all since this weekend. Pt on Eliquis but has not taken in 3 days.

## 2024-05-27 NOTE — ED ADULT NURSE NOTE - CHIEF COMPLAINT QUOTE
Fall x2 this weekend, feels dizzy, has bloody stool all since this weekend. Pt on Eliquis but has not taken in 3 days.

## 2024-05-27 NOTE — ED PROVIDER NOTE - OBJECTIVE STATEMENT
79-year-old male past medical history hypertension, CAD, A-fib on Eliquis, CHF, GI bleed presents for evaluation.  Patient states he tripped and fell x 2 days ago since has generalized fatigue and dizziness with associated dark/bloody stools, no relieving factors.  Denies headache, neck pain, cough, shortness of breath, abdominal pain, dysuria, hematuria, vomiting, diarrhea.

## 2024-05-27 NOTE — ED PROVIDER NOTE - PHYSICAL EXAMINATION
CONST: NAD  EYES: Sclera and conjunctiva clear.   ENT: No nasal discharge. Oropharynx normal appearing.   NECK: Non-tender, no meningeal signs. normal ROM. supple   CARD: S1 S2; No jvd  RESP: Equal BS B/L, No wheezes, rhonchi or rales. No distress  GI: Soft, non-tender, non-distended. normal BS  MS: Normal ROM in all extremities. pulses 2 +. no calf tenderness or swelling  SKIN: Warm, dry, no acute rashes. Good turgor  NEURO: A&Ox3, No focal deficits. Strength 5/5 with no sensory deficits.

## 2024-05-27 NOTE — ED PROVIDER NOTE - NS ED ATTENDING STATEMENT MOD
This was a shared visit with the KELI. I reviewed and verified the documentation. I have personally provided the amount of critical care time documented below excluding time spent on separate procedures.

## 2024-05-27 NOTE — ED PROVIDER NOTE - DIFFERENTIAL DIAGNOSIS
see mdm Differential Diagnosis see mdm    Independent interpretation of the EKG performed by MD Mclean  Independent interpretation of the X-Ray film(s) performed by MD Mclean

## 2024-05-28 ENCOUNTER — TRANSCRIPTION ENCOUNTER (OUTPATIENT)
Age: 79
End: 2024-05-28

## 2024-05-28 ENCOUNTER — RESULT REVIEW (OUTPATIENT)
Age: 79
End: 2024-05-28

## 2024-05-28 LAB
ALBUMIN SERPL ELPH-MCNC: 4.1 G/DL — SIGNIFICANT CHANGE UP (ref 3.5–5.2)
ALP SERPL-CCNC: 41 U/L — SIGNIFICANT CHANGE UP (ref 30–115)
ALT FLD-CCNC: 8 U/L — SIGNIFICANT CHANGE UP (ref 0–41)
ANION GAP SERPL CALC-SCNC: 13 MMOL/L — SIGNIFICANT CHANGE UP (ref 7–14)
AST SERPL-CCNC: 12 U/L — SIGNIFICANT CHANGE UP (ref 0–41)
BASOPHILS # BLD AUTO: 0.03 K/UL — SIGNIFICANT CHANGE UP (ref 0–0.2)
BASOPHILS # BLD AUTO: 0.03 K/UL — SIGNIFICANT CHANGE UP (ref 0–0.2)
BASOPHILS NFR BLD AUTO: 0.7 % — SIGNIFICANT CHANGE UP (ref 0–1)
BASOPHILS NFR BLD AUTO: 0.8 % — SIGNIFICANT CHANGE UP (ref 0–1)
BILIRUB SERPL-MCNC: 2 MG/DL — HIGH (ref 0.2–1.2)
BUN SERPL-MCNC: 20 MG/DL — SIGNIFICANT CHANGE UP (ref 10–20)
CALCIUM SERPL-MCNC: 8.2 MG/DL — LOW (ref 8.4–10.5)
CHLORIDE SERPL-SCNC: 105 MMOL/L — SIGNIFICANT CHANGE UP (ref 98–110)
CO2 SERPL-SCNC: 20 MMOL/L — SIGNIFICANT CHANGE UP (ref 17–32)
CREAT SERPL-MCNC: 1.2 MG/DL — SIGNIFICANT CHANGE UP (ref 0.7–1.5)
EGFR: 62 ML/MIN/1.73M2 — SIGNIFICANT CHANGE UP
EOSINOPHIL # BLD AUTO: 0.08 K/UL — SIGNIFICANT CHANGE UP (ref 0–0.7)
EOSINOPHIL # BLD AUTO: 0.08 K/UL — SIGNIFICANT CHANGE UP (ref 0–0.7)
EOSINOPHIL NFR BLD AUTO: 1.7 % — SIGNIFICANT CHANGE UP (ref 0–8)
EOSINOPHIL NFR BLD AUTO: 2 % — SIGNIFICANT CHANGE UP (ref 0–8)
GLUCOSE SERPL-MCNC: 97 MG/DL — SIGNIFICANT CHANGE UP (ref 70–99)
HCT VFR BLD CALC: 24 % — LOW (ref 42–52)
HCT VFR BLD CALC: 25.3 % — LOW (ref 42–52)
HGB BLD-MCNC: 7.3 G/DL — LOW (ref 14–18)
HGB BLD-MCNC: 7.8 G/DL — LOW (ref 14–18)
IMM GRANULOCYTES NFR BLD AUTO: 0.3 % — SIGNIFICANT CHANGE UP (ref 0.1–0.3)
IMM GRANULOCYTES NFR BLD AUTO: 0.4 % — HIGH (ref 0.1–0.3)
INR BLD: 1.08 RATIO — SIGNIFICANT CHANGE UP (ref 0.65–1.3)
LACTATE SERPL-SCNC: 0.9 MMOL/L — SIGNIFICANT CHANGE UP (ref 0.7–2)
LYMPHOCYTES # BLD AUTO: 0.61 K/UL — LOW (ref 1.2–3.4)
LYMPHOCYTES # BLD AUTO: 1.44 K/UL — SIGNIFICANT CHANGE UP (ref 1.2–3.4)
LYMPHOCYTES # BLD AUTO: 15.6 % — LOW (ref 20.5–51.1)
LYMPHOCYTES # BLD AUTO: 31.3 % — SIGNIFICANT CHANGE UP (ref 20.5–51.1)
MAGNESIUM SERPL-MCNC: 2.3 MG/DL — SIGNIFICANT CHANGE UP (ref 1.8–2.4)
MCHC RBC-ENTMCNC: 22.5 PG — LOW (ref 27–31)
MCHC RBC-ENTMCNC: 22.7 PG — LOW (ref 27–31)
MCHC RBC-ENTMCNC: 30.4 G/DL — LOW (ref 32–37)
MCHC RBC-ENTMCNC: 30.8 G/DL — LOW (ref 32–37)
MCV RBC AUTO: 73.8 FL — LOW (ref 80–94)
MCV RBC AUTO: 74.1 FL — LOW (ref 80–94)
MONOCYTES # BLD AUTO: 0.45 K/UL — SIGNIFICANT CHANGE UP (ref 0.1–0.6)
MONOCYTES # BLD AUTO: 0.46 K/UL — SIGNIFICANT CHANGE UP (ref 0.1–0.6)
MONOCYTES NFR BLD AUTO: 11.8 % — HIGH (ref 1.7–9.3)
MONOCYTES NFR BLD AUTO: 9.8 % — HIGH (ref 1.7–9.3)
NEUTROPHILS # BLD AUTO: 2.58 K/UL — SIGNIFICANT CHANGE UP (ref 1.4–6.5)
NEUTROPHILS # BLD AUTO: 2.72 K/UL — SIGNIFICANT CHANGE UP (ref 1.4–6.5)
NEUTROPHILS NFR BLD AUTO: 56.1 % — SIGNIFICANT CHANGE UP (ref 42.2–75.2)
NEUTROPHILS NFR BLD AUTO: 69.5 % — SIGNIFICANT CHANGE UP (ref 42.2–75.2)
NRBC # BLD: 0 /100 WBCS — SIGNIFICANT CHANGE UP (ref 0–0)
NRBC # BLD: 0 /100 WBCS — SIGNIFICANT CHANGE UP (ref 0–0)
PHOSPHATE SERPL-MCNC: 3.1 MG/DL — SIGNIFICANT CHANGE UP (ref 2.1–4.9)
PLATELET # BLD AUTO: 159 K/UL — SIGNIFICANT CHANGE UP (ref 130–400)
PLATELET # BLD AUTO: 172 K/UL — SIGNIFICANT CHANGE UP (ref 130–400)
PMV BLD: 10.1 FL — SIGNIFICANT CHANGE UP (ref 7.4–10.4)
PMV BLD: 9.9 FL — SIGNIFICANT CHANGE UP (ref 7.4–10.4)
POTASSIUM SERPL-MCNC: 3.9 MMOL/L — SIGNIFICANT CHANGE UP (ref 3.5–5)
POTASSIUM SERPL-SCNC: 3.9 MMOL/L — SIGNIFICANT CHANGE UP (ref 3.5–5)
PROT SERPL-MCNC: 6.1 G/DL — SIGNIFICANT CHANGE UP (ref 6–8)
PROTHROM AB SERPL-ACNC: 12.3 SEC — SIGNIFICANT CHANGE UP (ref 9.95–12.87)
RBC # BLD: 3.24 M/UL — LOW (ref 4.7–6.1)
RBC # BLD: 3.43 M/UL — LOW (ref 4.7–6.1)
RBC # FLD: 21.3 % — HIGH (ref 11.5–14.5)
RBC # FLD: 22.1 % — HIGH (ref 11.5–14.5)
SODIUM SERPL-SCNC: 138 MMOL/L — SIGNIFICANT CHANGE UP (ref 135–146)
WBC # BLD: 3.91 K/UL — LOW (ref 4.8–10.8)
WBC # BLD: 4.6 K/UL — LOW (ref 4.8–10.8)
WBC # FLD AUTO: 3.91 K/UL — LOW (ref 4.8–10.8)
WBC # FLD AUTO: 4.6 K/UL — LOW (ref 4.8–10.8)

## 2024-05-28 PROCEDURE — 76705 ECHO EXAM OF ABDOMEN: CPT | Mod: 26

## 2024-05-28 PROCEDURE — 88312 SPECIAL STAINS GROUP 1: CPT | Mod: 26

## 2024-05-28 PROCEDURE — 88305 TISSUE EXAM BY PATHOLOGIST: CPT | Mod: 26

## 2024-05-28 PROCEDURE — 99222 1ST HOSP IP/OBS MODERATE 55: CPT

## 2024-05-28 PROCEDURE — 99232 SBSQ HOSP IP/OBS MODERATE 35: CPT

## 2024-05-28 PROCEDURE — 43239 EGD BIOPSY SINGLE/MULTIPLE: CPT

## 2024-05-28 RX ORDER — PANTOPRAZOLE SODIUM 20 MG/1
40 TABLET, DELAYED RELEASE ORAL
Refills: 0 | Status: DISCONTINUED | OUTPATIENT
Start: 2024-05-28 | End: 2024-05-31

## 2024-05-28 RX ADMIN — SACUBITRIL AND VALSARTAN 1 TABLET(S): 24; 26 TABLET, FILM COATED ORAL at 05:28

## 2024-05-28 RX ADMIN — TAMSULOSIN HYDROCHLORIDE 0.4 MILLIGRAM(S): 0.4 CAPSULE ORAL at 21:18

## 2024-05-28 RX ADMIN — MONTELUKAST 10 MILLIGRAM(S): 4 TABLET, CHEWABLE ORAL at 18:37

## 2024-05-28 RX ADMIN — ATORVASTATIN CALCIUM 20 MILLIGRAM(S): 80 TABLET, FILM COATED ORAL at 21:18

## 2024-05-28 RX ADMIN — SACUBITRIL AND VALSARTAN 1 TABLET(S): 24; 26 TABLET, FILM COATED ORAL at 18:37

## 2024-05-28 NOTE — PROGRESS NOTE ADULT - ASSESSMENT
78 yo M with hx of CAD s/p CABG, Paroxysmal A.fib (eliquis), HFimpEF, severe LAE/EARL, mi-mod aortic regurg, h/o aortic dissection s/p repair, h/o GIB most recently an ICU admission in 2023 during which he ultimately did not have EGD, 2 days ago had syncopal episode w unclear downtime, since then has had melena and worsening weakness/lightheadedness and so presented to the ED where he was found to have Hb 6.9.    A/P:   Acute blood loss anemia on chronic anemia:   Upper GI bleeding: patient with melena  Hb 6.9, baseline ~9  CT abdomen: neg for acute GIB. No evidence of hematoma.  JESSICA + melena. s/p 1 packed RBC transfusion, Hb improved to 7.8  Hold Eliquis, Continue Protonix drip. GI plan for EGD today  Monitor Hb      Syncope:   Likely from severe anemia, orthostatic hypotension.   EKG showed Atrial Fibrillation with NST changes. Troponin 14.   Continue telemetry.     Hx of Aortic dissection  CT abdomen shows: Stable short segment aortic dissection originating immediately after the left subclavian artery takeoff from the aortic arch extending only a few centimeters along the aortic arch. Dissection flap does not extend into the descending thoracic aorta or into any branch vessels.  Mild increase in diameter of the descending thoracic aorta with no involvement of dissection.   Seen by vascular surgery, outpatient follow up.     Chronic Atrial Fibrillation:   Eliquis on hold (it was held two weeks ago per his PCP).   Metoprolol held, resume if BP stable.     CAD s/p CABG:   Stable, no chest pain,   HFimpEF: continue Entresto.     DVT ppx: SCD  GI ppx: PPI  #Progress Note Handoff:  Pending (specify): EGD  Family discussion:  Disposition: Home.

## 2024-05-28 NOTE — PROGRESS NOTE ADULT - ASSESSMENT
80 y/o man w PMHx of HTN, CAD, s/p CABG 2015, AF on Eliquis, HFimpEF, severe LAE/EARL, mi-mod aortic regurg, h/o aortic dissection s/p repair, h/o GIB most recently an ICU admission in 2023 during which he ultimately did not have EGD, 2 days ago had syncopal episode w unclear downtime, since then has had melena and worsening weakness/lightheadedness and so presented to the ED where he was found to have Hb 6.9 from discharge value 9.1, ordered for 1uPRBC, pantoprazole drip, admitted to medicine for GIB.       #GIB  #Syncope   DDx most likely upper GIB w syncope due to GIB. No neuro deficits noted by family and pt had pan CT w no acute findings. Has stopped taking Eliquis 5/26   - previous GIB 2023 > ICU admission in 2023 did not have EGD  - Hb 6.9 >> received 1 unit >> Hb 7.8  - lactate 2.5 >> 0.9  - Pantoprazole drip   - NPO overnight   - GI recs appreciated >> EGD 5/28, vasc eval & clearance needed,  target Hb >8, if Hb drops hold AC, f/u RUQ US (L hepatic cyst), keep NPO  - no melena overnight > consider adv diet     #AF on Eliquis  Rate controlled   - Hold Eliquis for GIB   - Continue conversation with family re: desire to restart AC   - No home rate control meds such as CCB or BB     #HTN  #CAD, s/p CABG 2015  #HFimpEF, severe LAE/EARL, mi-mod aortic regurg,   #h/o aortic dissection s/p repair  - Trop 14 on admission  - CT Chest Abd/Pelvis noted essentially stable dissection and pt w/o acute back/abd pain.   - continue home meds: Entresto 26mg, Atorvastatin 20mg                                                                       # DVT prophylaxis: N/A   # GI prophylaxis: PPI  # Diet: NPO    # Activity: Ambulate w assistance   # Code status: FULL CODE

## 2024-05-28 NOTE — CONSULT NOTE ADULT - ASSESSMENT
Hakka speaking 78 y/o man w PMHx of type B Aortic Dissection (seen by Dr. Carlton in 2023, never followed up), HTN, CAD, s/p CABG 2015, AF on Eliquis, HFimpEF, severe LAE/EARL, mi-mod aortic regurg, h/o aortic dissection s/p repair, h/o GIB most recently an ICU admission in 2023 during which he ultimately did not have EGD, 2 days ago had syncopal episode w unclear downtime, since then has had melena and worsening weakness/lightheadedness and so presented to the ED where he was found to have Hb 6.9 from discharge value 9.1, ordered for 1uPRBC, pantoprazole drip, admitted to medicine for GIB.    Vascular surgery called to clear prior endoscopy. Pt with stable type B Aortic Dissection, initially seen May 2023, but never followed up  CTA chest and A/P reviewed    - I reviewed today's labs  - I reviewed and personally visualized all the radiology imagings  - I discussed the plan with attending Dr. Carlton:  - May proceed with endoscopy  - follow up in the office with Joy Carlton in 1 month    SPECTRA [id number]

## 2024-05-28 NOTE — CHART NOTE - NSCHARTNOTEFT_GEN_A_CORE
PACU ANESTHESIA ADMISSION NOTE      Procedure:   Post op diagnosis:      ____  Intubated  TV:______       Rate: ______      FiO2: ______    _x___  Patent Airway    _x___  Full return of protective reflexes    _x___  Full recovery from anesthesia / back to baseline status    Vitals:  T(C): 36.8 (05-28-24 @ 17:11), Max: 37.2 (05-28-24 @ 02:28)    BP  101/51  P  61  R  18  Sat  96    Mental Status:  _x___ Awake   _____ Alert   _____ Drowsy   _____ Sedated    Nausea/Vomiting:  _x___  NO       ______Yes,   See Post - Op Orders         Pain Scale (0-10):  __0___    Treatment: _x___ None    ____ See Post - Op/PCA Orders    Post - Operative Fluids:   __x__ Oral   ____ See Post - Op Orders    Plan: Discharge:   ____Home       __x___Floor     _____Critical Care    _____  Other:_________________    Comments:  No anesthesia issues or complications noted.  Discharge when criteria met.

## 2024-05-28 NOTE — CONSULT NOTE ADULT - ATTENDING COMMENTS
Stable type B dissection   Follow up in office [phone]
78yo male presents with dark stools and anemia. Vascular note reviewed. Plan for EGD.

## 2024-05-28 NOTE — CONSULT NOTE ADULT - SUBJECTIVE AND OBJECTIVE BOX
VASCULAR SURGERY CONSULT NOTE      HPI:  History was limited by pt's dialect and language barrier - pt does NOT speak Cantonese. Speaks [place]ak[place] dialect which there are few or no translators for. Best scenario is to have family assist with translating - family speaks [place]ka as well as more common dialects including Cantonese and Mandarin. They will be listed below.     Hakka speaking 78 y/o man w PMHx of type B Aortic Dissection (seen by Dr. Carlton in 2023, never followed up), HTN, CAD, s/p CABG 2015, AF on Eliquis, HFimpEF, severe LAE/EARL, mi-mod aortic regurg, h/o aortic dissection s/p repair, h/o GIB most recently an ICU admission in 2023 during which he ultimately did not have EGD, 2 days ago had syncopal episode w unclear downtime, since then has had melena and worsening weakness/lightheadedness and so presented to the ED where he was found to have Hb 6.9 from discharge value 9.1, ordered for 1uPRBC, pantoprazole drip, admitted to medicine for GIB.   HCP wife Wisam De La Cruz (spelling?) [place]akka speaking [phone]  Daughter Courtney King and Cantonese speaking [phone]   Daughter Teresa Hemphill H[place], Mandarin, and some English speaking [phone]   Son in law Stfefi Jones Hak[place] and Mandarin, some Cantonese, some English speaking [phone]    Pt is able to relay that he had syncope 2 days ago (5/25/24) and clarifies that he did not trip/fall. Is unsure how long he was passed out for. Subsequently has been off Eliquis and some of his medications but specifies he continued to take Omeprazole QD. On day of admission is mostly experiencing dizziness and weakness, but did not have any episode of melenas. Mild epigastric discomfort/pain.   ROS: No fevers, chills, dysuria, constipation, diarrhea, abd pain. No confusion.     Triage vitals were: 119/64, 58bpm, RR16, 99% on RA, 98.2  Repeat vitals notable for: 131/62, 64bpm   Labs notable for: Hb 6.9 from discharge value 9.1, trop 14, lactate 2.5  Imaging: Had CT Head, C spine, Chest, Abd/Pelvis most notable for stable aortic dissection.   Treatments: ordered for 1uPRBC, pantoprazole drip  Pending: At time of admission had not received blood transfusion  (27 May 2024 19:49)        PAST MEDICAL & SURGICAL HISTORY:  HTN (hypertension)      Aortic dissection      Hypertension      CAD (coronary artery disease)      S/P CABG x 1      Unspecified atrial fibrillation      Chronic systolic congestive heart failure      Asthma      S/P aortic dissection repair        No Known Allergies    Home Medications:  atorvastatin 20 mg oral tablet: 1 tab(s) orally once a day (22 Jan 2024 18:09)  docusate sodium 100 mg oral tablet: 1 tab(s) orally once a day (22 Jan 2024 18:09)  Eliquis 5 mg oral tablet: 1 tab(s) orally 2 times a day (22 Jan 2024 18:09)  Entresto 24 mg-26 mg oral tablet: 1 tab(s) orally once a day (22 Jan 2024 18:09)  meclizine 25 mg oral tablet: 1 tab(s) orally once a day (22 Jan 2024 18:09)  montelukast 10 mg oral tablet: 1 tab(s) orally once a day (22 Jan 2024 18:09)  omeprazole 40 mg oral delayed release capsule: 1 cap(s) orally once a day (27 May 2024 20:24)    No permtinent family history of PVD    REVIEW OF SYSTEMS:  GENERAL:                                         negative  SKIN:                                                 negative  OPTHALMOLOGIC:                          negative  ENMT:                                               negative  RESPIRATORY AND THORAX:        negative  CARDIOVASCULAR:                         see HPI  GASTROINTESTINAL:                       see HPI  NEPHROLOGY:                                  negative  MUSCULOSKELETAL:                       negative  NEUROLOGIC:                                   negative  PSYCHIATRIC:                                    negative  HEMATOLOGY/LYMPHATICS:         negative  ENDOCRINE:                                     negative  ALLERGIC/IMMUNOLOGIC:            negative    12 point ROS otherwise normal except as stated in HPI  FHx: none  SHX:  [ ]  smoking     [ ] alcohol use    PHYSICAL EXAM  Vital Signs Last 24 Hrs  T(C): 36.9 (28 May 2024 05:04), Max: 37.2 (28 May 2024 02:28)  T(F): 98.5 (28 May 2024 05:04), Max: 98.9 (28 May 2024 02:28)  HR: 76 (28 May 2024 05:04) (58 - 82)  BP: 107/57 (28 May 2024 05:04) (107/57 - 143/78)  BP(mean): --  RR: 18 (28 May 2024 05:04) (16 - 18)  SpO2: 97% (27 May 2024 20:06) (97% - 99%)    Parameters below as of 27 May 2024 20:06  Patient On (Oxygen Delivery Method): room air        Appearance: Normal	  HEENT:   Normal oral mucosa, PERRL, EOMI	  Neck: Supple, - JVD;   Cardiovascular: Normal S1 S2, No JVD, No murmurs,   Respiratory: Lungs clear to auscultation, No Rales, Rhonchi, Wheezing	  Gastrointestinal:  Soft, Non-tender, positive BS	  Skin: No rashes, No ecchymoses, No cyanosis  Extremities: Normal range of motion, No clubbing, cyanosis or edema  Neurologic: Non-focal  Psychiatry: A & O x 3, Mood & affect appropriate        MEDICATIONS:   MEDICATIONS  (STANDING):  atorvastatin 20 milliGRAM(s) Oral at bedtime  montelukast 10 milliGRAM(s) Oral daily  pantoprazole Infusion 8 mG/Hr (10 mL/Hr) IV Continuous <Continuous>  sacubitril 24 mG/valsartan 26 mG 1 Tablet(s) Oral two times a day  tamsulosin 0.4 milliGRAM(s) Oral at bedtime    MEDICATIONS  (PRN):  acetaminophen     Tablet .. 650 milliGRAM(s) Oral every 6 hours PRN Temp greater or equal to 38C (100.4F), Mild Pain (1 - 3)  albuterol    90 MICROgram(s) HFA Inhaler 2 Puff(s) Inhalation every 6 hours PRN for shortness of breath and/or wheezing  benzonatate 100 milliGRAM(s) Oral three times a day PRN for cough  melatonin 3 milliGRAM(s) Oral at bedtime PRN Insomnia      LAB/STUDIES:                        7.8    4.60  )-----------( 172      ( 28 May 2024 05:15 )             25.3     05-28    138  |  105  |  20  ----------------------------<  97  3.9   |  20  |  1.2    Ca    8.2<L>      28 May 2024 05:15  Phos  3.1     05-28  Mg     2.3     05-28    TPro  6.1  /  Alb  4.1  /  TBili  2.0<H>  /  DBili  x   /  AST  12  /  ALT  8   /  AlkPhos  41  05-28    PT/INR - ( 28 May 2024 05:15 )   PT: 12.30 sec;   INR: 1.08 ratio         PTT - ( 27 May 2024 15:30 )  PTT:29.8 sec  LIVER FUNCTIONS - ( 28 May 2024 05:15 )  Alb: 4.1 g/dL / Pro: 6.1 g/dL / ALK PHOS: 41 U/L / ALT: 8 U/L / AST: 12 U/L / GGT: x             Urinalysis Basic - ( 28 May 2024 05:15 )    Color: x / Appearance: x / SG: x / pH: x  Gluc: 97 mg/dL / Ketone: x  / Bili: x / Urobili: x   Blood: x / Protein: x / Nitrite: x   Leuk Esterase: x / RBC: x / WBC x   Sq Epi: x / Non Sq Epi: x / Bacteria: x            Urinalysis with Rflx Culture (collected 27 May 2024 18:15)        IMAGING:    CT Abdomen and Pelvis w/ IV Cont (05.27.24 @ 15:55) >  Stable short segment aortic dissection originating immediately after the   left subclavian artery takeoff from the aortic arch extending only a few   centimeters along the aortic arch. Dissection flap does not extend into   the descending thoracic aorta or into any branch vessels.    Mild increase in diameter of the descending thoracic aorta with no   involvement of dissection. Close interval follow-up recommended.

## 2024-05-28 NOTE — CONSULT NOTE ADULT - ASSESSMENT
Assessment and Plan  Case of a 79 year old male patient known to have a history of HTN, CAD, afib on eliquis, CHF, anemia, and PUD who presented to the ED on 05/27 for evaluation of dark stools and fall, found to have acute on chronic anemia. We are consulted for concern of anemia in setting of reported dark stools.      Reported Melena Due to Suspected Upper GI Bleed   Likely Secondary to PUD in Setting of History of Hematemesis with Reported PUD?  Rule Out Malignancy in Setting of Reported History of Gastric and Colon Cancer s/p Chemotherapy in 2018  Rule Out AVM VS Esophageal Ulcer VS Erosive Gastritis VS Dieulafoy lesion  Symptomatic Acute on Chronic Microcytic Anemia  * History of gastric and colon cancer diagnosed endoscopically in 2018 during anemia/dark stool workup  * History of recurrent admissions melena in setting of prior epistaxis in 2022 and 05/2023  * Status post EGD in 04/2022 at [facility] in [place] (reported gastric ulcers in H/P but family said no source was identified). Colonoscopy in 04/2022 at [facility] in [place] was unremarkable  * Evaluated by GI team at [facility] in 05/2023: recommended outpatient follow up and obtaining records    * Current presentation: passed out on Friday; has been having dark brown stools (formed; once daily; since Saturday) associated with weakness and light headedness; no abdominal pain or nausea or vomiting or weight loss or NSAID use  * Hemodynamically stable   * Baseline hemoglobin (prior to admission): Hb 9 in 05/2023  * Hemoglobin trend: Hb 6.9 MCV 71.1 on 05/27 s/p 1 unit pRBC -> Hb 7.8 on 05/28  * Coags: INR 1.1 on 05/27; LA 2.5 on 05/27   * Last use of eliquis was on 05/26 AM per family  * JESSICA 05/28 black with brown tinge  * CT AP IC 05/27 no active bleeding  * Family History: no colon cancer    RECOMMENDATIONS  - Recommend obtaining outpatient records that confirm gastric/colonic malignancy in 2018 and that reveal endoscopic findings in 04/2022   - Scheduled for EGD today. Discussed goals of care and plan with family over the phone (daughter and son at  and ). 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 Maintain active Type and screen. Check iron studies (will likely be altered from recent transfusion)  - Trend BUN; ensure placement of x2 large 18G IV Bores  - Monitor MAP and keep >65mmHg. Trend LA  - Hold Anticoagulation if ok with cardiology in setting of drop in Hb and reported bleeding as risks currently outweigh benefits. Last eliquis dose was on 05/26 per family  - Monitor BM for recurrent melena   - Please avoid non essential NSAIDs  - If any unstable bleed, please call GI stat      Stable Left Hepatic Cyst  * LFTs 0.6/39/12/9 on 05/27  * Previously noted on CT angio AP IC 05/12/2023  * CT AP IC 05/27/2024 left hepatic lobe cyst  * No RUQ US    RECOMMENDATIONS  - Trend LFTs  - Check RUQ US  - Outpatient follow up with our [facility] located at [address] [place] [address], [address]. Telephone No: [phone]      Thank you for your consult.  - Please note that plan was discussed with Dr. HAILE and communicated with medical team.   - Please reach GI on [phone] during weekdays till 5pm.  - Please call the GI service line after 5pm on Weekdays and anytime on Weekends: [phone].      Indiana Arcos MD  PGY - 4 Gastroenterology Fellow   [facility]     Assessment and Plan  Case of a 79 year old male patient known to have a history of HTN, CAD, afib on eliquis, CHF, anemia, and PUD who presented to the ED on 05/27 for evaluation of dark stools and fall, found to have acute on chronic anemia. We are consulted for concern of anemia in setting of reported dark stools.      Reported Melena Due to Suspected Upper GI Bleed   Likely Secondary to PUD in Setting of History of Hematemesis with Reported PUD?  Rule Out Malignancy in Setting of Reported History of Gastric and Colon Cancer s/p Chemotherapy in 2018  Rule Out AVM VS Esophageal Ulcer VS Erosive Gastritis VS Dieulafoy lesion  Symptomatic Acute on Chronic Microcytic Anemia  * History of gastric and colon cancer diagnosed endoscopically in 2018 during anemia/dark stool workup  * History of recurrent admissions melena in setting of prior epistaxis in 2022 and 05/2023  * Status post EGD in 04/2022 at [facility] in [place] (reported gastric ulcers in H/P but family said no source was identified). Colonoscopy in 04/2022 at [facility] in [place] was unremarkable  * Evaluated by GI team at [facility] in 05/2023: recommended outpatient follow up and obtaining records    * Current presentation: passed out on Friday; has been having dark brown stools (formed; once daily; since Saturday) associated with weakness and light headedness; no abdominal pain or nausea or vomiting or weight loss or NSAID use  * Hemodynamically stable   * Baseline hemoglobin (prior to admission): Hb 9 in 05/2023  * Hemoglobin trend: Hb 6.9 MCV 71.1 on 05/27 s/p 1 unit pRBC -> Hb 7.8 on 05/28  * Coags: INR 1.1 on 05/27; LA 2.5 on 05/27   * Last use of eliquis was on 05/26 AM per family  * JESSICA 05/28 black with brown tinge  * CT AP IC 05/27 no active bleeding  * Family History: no colon cancer    RECOMMENDATIONS  - Recommend obtaining outpatient records that confirm gastric/colonic malignancy in 2018 and that reveal endoscopic findings in 04/2022   - Scheduled for EGD today. Discussed goals of care and plan with family over the phone (daughter and son in law at  and ). 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 Maintain active Type and screen. Check iron studies (will likely be altered from recent transfusion)  - Trend BUN; ensure placement of x2 large 18G IV Bores  - Monitor MAP and keep >65mmHg. Trend LA  - Hold Anticoagulation if ok with cardiology in setting of drop in Hb and reported bleeding as risks currently outweigh benefits. Last eliquis dose was on 05/26 per family  - Monitor BM for recurrent melena   - Please avoid non essential NSAIDs  - If any unstable bleed, please call GI stat      Stable Left Hepatic Cyst  * LFTs 0.6/39/12/9 on 05/27  * Previously noted on CT angio AP IC 05/12/2023  * CT AP IC 05/27/2024 left hepatic lobe cyst  * No RUQ US    RECOMMENDATIONS  - Trend LFTs  - Check RUQ US  - Outpatient follow up with our [facility] located at [address] [place] [address], [address]. Telephone No: [phone]      Thank you for your consult.  - Please note that plan was discussed with Dr. HAILE and communicated with medical team.   - Please reach GI on [phone] during weekdays till 5pm.  - Please call the GI service line after 5pm on Weekdays and anytime on Weekends: [phone].      Indiana Arcos MD  PGY - 4 Gastroenterology Fellow   [facility]

## 2024-05-28 NOTE — CONSULT NOTE ADULT - SUBJECTIVE AND OBJECTIVE BOX
Gastroenterology Initial Consult Note      Location: [facility] 3C [address] ([facility])  Patient Name: YAMILE DU  Age: 79y  Gender: Male      Chief Complaint  Patient is a 79y old Male who presents with a chief complaint of syncope, melena (27 May 2024 19:49)  Primary diagnosis of Gastrointestinal hemorrhage      Reason for Consult  Reported Melena  Anemia      History of Present Illness  79 year old male patient known to have a history of HTN, CAD, afib on eliquis, CHF, anemia, and PUD who presented to the ED on 05/27 for evaluation of dark stools and fall, found to have acute on chronic anemia. We are consulted for concern of anemia in setting of reported dark stools.  Summary:  * History of gastric and colon cancer diagnosed endoscopically in 2018 during anemia/dark stool workup  * History of recurrent admissions melena in setting of prior epistaxis in 2022 and 05/2023  * Status post EGD in 04/2022 at [facility] in [place] (reported gastric ulcers in H/P but family said no source was identified). Colonoscopy in 04/2022 at [facility] in [place] was unremarkable  * Evaluated by GI team at [facility] in 05/2023: recommended outpatient follow up and obtaining records    * Current presentation: passed out on Friday; has been having dark brown stools (formed; once daily; since Saturday) associated with weakness and light headedness; no abdominal pain or nausea or vomiting or weight loss or NSAID use  * Hemodynamically stable   * Baseline hemoglobin (prior to admission): Hb 9 in 05/2023  * Hemoglobin trend: Hb 6.9 MCV 71.1 on 05/27 s/p 1 unit pRBC -> Hb 7.8 on 05/28  * Coags: INR 1.1 on 05/27; LA 2.5 on 05/27   * Last use of eliquis was on 05/26 AM per family  * JESSICA 05/28 black with brown tinge  * CT AP IC 05/27 no active bleeding  * Family History: no colon cancer      Prior EGD:  as below      Prior Colonoscopy:  as below      Past Medical and Surgical History:  HTN (hypertension)  Aortic dissection  Hypertension  CAD (coronary artery disease)  S/P CABG x 1  Unspecified atrial fibrillation  Chronic systolic congestive heart failure  Asthma  S/P aortic dissection repair        Home Medications:  Home Medications:  atorvastatin 20 mg oral tablet: 1 tab(s) orally once a day (22 Jan 2024 18:09)  docusate sodium 100 mg oral tablet: 1 tab(s) orally once a day (22 Jan 2024 18:09)  Eliquis 5 mg oral tablet: 1 tab(s) orally 2 times a day (22 Jan 2024 18:09)  Entresto 24 mg-26 mg oral tablet: 1 tab(s) orally once a day (22 Jan 2024 18:09)  meclizine 25 mg oral tablet: 1 tab(s) orally once a day (22 Jan 2024 18:09)  montelukast 10 mg oral tablet: 1 tab(s) orally once a day (22 Jan 2024 18:09)  omeprazole 40 mg oral delayed release capsule: 1 cap(s) orally once a day (27 May 2024 20:24)      Social History:  Tobacco: denies  Alcohol: denies  Drugs: denies      Allergies:  No Known Allergies      Family History:  no colon cancer        Vital Signs in the last 24 hours   Vitals Summary T(C): 36.9 (05-28-24 @ 05:04), Max: 37.2 (05-28-24 @ 02:28)  HR: 76 (05-28-24 @ 05:04) (58 - 82)  BP: 107/57 (05-28-24 @ 05:04) (107/57 - 143/78)  RR: 18 (05-28-24 @ 05:04) (16 - 18)  SpO2: 97% (05-27-24 @ 20:06) (97% - 99%)  Vent Data   Intake/ Output   Measurements   Weight (kg): 95.3 (05-27-24 @ 14:52)      Physical Exam  * General Appearance: Alert, interactive, oriented to time, place, and person, in no acute distress  * Lungs: Good bilateral air entry, normal breath sounds   * Heart: Regular Rate and Rhythm, normal S1 and S2, no audible murmur, rub, or gallop  * Abdomen: Symmetric, non-distended, soft, non-tender, bowel sounds active all four quadrants  * Rectal: Black stool with brown tinge, Normal tone, no palpable masses or tenderness      Investigations   Laboratory Workup      - CBC:                        7.8    4.60  )-----------( 172      ( 28 May 2024 05:15 )             25.3       - Hgb Trend:  7.8  05-28-24 @ 05:15  6.9  05-27-24 @ 15:30        - Chemistry:  05-28    138  |  105  |  20  ----------------------------<  97  3.9   |  20  |  1.2    Ca    8.2<L>      28 May 2024 05:15  Phos  3.1     05-28  Mg     2.3     05-28    TPro  6.1  /  Alb  4.1  /  TBili  2.0<H>  /  DBili  x   /  AST  12  /  ALT  8   /  AlkPhos  41  05-28    Liver panel trend:  TBili 2.0   /   AST 12   /   ALT 8   /   AlkP 41   /   Tptn 6.1   /   Alb 4.1    /   DBili --      05-28  TBili 0.6   /   AST 12   /   ALT 9   /   AlkP 39   /   Tptn 6.2   /   Alb 4.2    /   DBili --      05-27      - Coagulation Studies:  PT/INR - ( 28 May 2024 05:15 )   PT: 12.30 sec;   INR: 1.08 ratio         PTT - ( 27 May 2024 15:30 )  PTT:29.8 sec      Microbiological Workup  Urinalysis Basic - ( 28 May 2024 05:15 )    Color: x / Appearance: x / SG: x / pH: x  Gluc: 97 mg/dL / Ketone: x  / Bili: x / Urobili: x   Blood: x / Protein: x / Nitrite: x   Leuk Esterase: x / RBC: x / WBC x   Sq Epi: x / Non Sq Epi: x / Bacteria: x        Urinalysis with Rflx Culture (collected 27 May 2024 18:15)        Radiological Workup  CT Abdomen and Pelvis w/ IV Cont:   ACC: [id number] EXAM:  CT ABDOMEN AND PELVIS IC   ORDERED BY: SON PARIS     ACC: [id number] EXAM:  CT CHEST IC   ORDERED BY: SON PARIS     PROCEDURE DATE:  05/27/2024          INTERPRETATION:  CLINICAL STATEMENT: Trauma. History of aortic dissection    TECHNIQUE: Multislice helical sections were obtained from the thoracic   inlet to the to the level of the pubic symphysis before intravenous   contrast administration. CTA images were then obtained though the   chestabdomen and pelvis following administration of 100c Optiray 320   intravenous contrast. MIP images were also acquired.    COMPARISON CT: CT chest abdomen and pelvis from May 12, 2023    OTHER STUDIES USED FOR CORRELATION: None.      FINDINGS:    AORTA: Again demonstrated is an aortic dissection involving the ascending   thoracic aorta. This dissection occurs superiorly medially after the left   subclavian artery takeoff and aortic arch and extends a few centimeters   along the aortic arch and terminates at approximately the level of the   main pulmonary artery. This is essentially unchanged from the prior exam.   Moderate atherosclerosis. The dissection does not extend into the   descending thoracic aorta or the abdominal aorta. Descending thoracic   aorta mildly aneurysmal at 4.2 cm, mild progression from prior study were   measured up to 3.9 cm. The abdominal aorta is atherosclerotic but not   frankly aneurysmal..    CHEST:  TUBES/LINES: None    LUNGS, PLEURA, AND AIRWAYS:Biapical pleural thickening. Hyperinflated   lungs. Bibasilar atelectasis. No pneumothorax.    MEDIASTINUM/LYMPH NODES:Subcentimeter mediastinal lymph nodes again noted.    HEART/GREAT VESSELS: Cardiomegaly. Sternotomy and CABG.    BONES AND SOFT TISSUES:        HEPATOBILIARY: Left hepatic lobe cyst. No calcified gallstones within the   gallbladder..    SPLEEN: Unremarkable.    PANCREAS: Unremarkable.    ADRENAL GLANDS: Unremarkable.    KIDNEYS: Both kidneys demonstrate symmetric enhancement. No   hydronephrosis. Left renal cyst..    LYMPH NODES: Unremarkable.    Pelvic: Mildly distended urinary bladder. Calcifications of the prostate   gland.    PERITONEUM/MESENTERY/BOWEL: No bowel obstruction. No abnormal bowel wall   thickening. The appendix is unremarkable..    BONES/SOFT TISSUES: Status post anatomy. Degenerative changes..          IMPRESSION:    Stable short segment aortic dissection originating immediately after the   left subclavian artery takeoff from the aortic arch extending only a few   centimeters along the aortic arch. Dissection flap does not extend into   the descending thoracic aorta or into any branch vessels.    Mild increase in diameter of the descending thoracic aorta with no   involvement of dissection. Close interval follow-up recommended.    No evidence of hematoma.    --- End of Report ---        Current Medications  Standing Medications  atorvastatin 20 milliGRAM(s) Oral at bedtime  montelukast 10 milliGRAM(s) Oral daily  pantoprazole Infusion 8 mG/Hr (10 mL/Hr) IV Continuous <Continuous>  sacubitril 24 mG/valsartan 26 mG 1 Tablet(s) Oral two times a day  tamsulosin 0.4 milliGRAM(s) Oral at bedtime    PRN Medications  acetaminophen     Tablet .. 650 milliGRAM(s) Oral every 6 hours PRN Temp greater or equal to 38C (100.4F), Mild Pain (1 - 3)  albuterol    90 MICROgram(s) HFA Inhaler 2 Puff(s) Inhalation every 6 hours PRN for shortness of breath and/or wheezing  benzonatate 100 milliGRAM(s) Oral three times a day PRN for cough  melatonin 3 milliGRAM(s) Oral at bedtime PRN Insomnia    Singles Doses Administered  (ADM OVERRIDE) 1 each &lt;see task&gt; GiveOnce  pantoprazole  Injectable 40 milliGRAM(s) IV Push Once

## 2024-05-29 LAB
ALBUMIN SERPL ELPH-MCNC: 3.5 G/DL — SIGNIFICANT CHANGE UP (ref 3.5–5.2)
ALLERGY+IMMUNOLOGY DIAG STUDY NOTE: SIGNIFICANT CHANGE UP
ALP SERPL-CCNC: 39 U/L — SIGNIFICANT CHANGE UP (ref 30–115)
ALT FLD-CCNC: 7 U/L — SIGNIFICANT CHANGE UP (ref 0–41)
ANION GAP SERPL CALC-SCNC: 7 MMOL/L — SIGNIFICANT CHANGE UP (ref 7–14)
AST SERPL-CCNC: 10 U/L — SIGNIFICANT CHANGE UP (ref 0–41)
BASOPHILS # BLD AUTO: 0.03 K/UL — SIGNIFICANT CHANGE UP (ref 0–0.2)
BASOPHILS NFR BLD AUTO: 0.7 % — SIGNIFICANT CHANGE UP (ref 0–1)
BILIRUB SERPL-MCNC: 1 MG/DL — SIGNIFICANT CHANGE UP (ref 0.2–1.2)
BLD GP AB SCN SERPL QL: SIGNIFICANT CHANGE UP
BUN SERPL-MCNC: 17 MG/DL — SIGNIFICANT CHANGE UP (ref 10–20)
CALCIUM SERPL-MCNC: 7.9 MG/DL — LOW (ref 8.4–10.5)
CHLORIDE SERPL-SCNC: 106 MMOL/L — SIGNIFICANT CHANGE UP (ref 98–110)
CO2 SERPL-SCNC: 25 MMOL/L — SIGNIFICANT CHANGE UP (ref 17–32)
CREAT SERPL-MCNC: 1 MG/DL — SIGNIFICANT CHANGE UP (ref 0.7–1.5)
DIR ANTIGLOB POLYSPECIFIC INTERPRETATION: SIGNIFICANT CHANGE UP
EGFR: 78 ML/MIN/1.73M2 — SIGNIFICANT CHANGE UP
EOSINOPHIL # BLD AUTO: 0.13 K/UL — SIGNIFICANT CHANGE UP (ref 0–0.7)
EOSINOPHIL NFR BLD AUTO: 3 % — SIGNIFICANT CHANGE UP (ref 0–8)
GLUCOSE SERPL-MCNC: 99 MG/DL — SIGNIFICANT CHANGE UP (ref 70–99)
HCT VFR BLD CALC: 23.2 % — LOW (ref 42–52)
HGB BLD-MCNC: 7.1 G/DL — LOW (ref 14–18)
IMM GRANULOCYTES NFR BLD AUTO: 0.5 % — HIGH (ref 0.1–0.3)
INR BLD: 1.16 RATIO — SIGNIFICANT CHANGE UP (ref 0.65–1.3)
LYMPHOCYTES # BLD AUTO: 0.68 K/UL — LOW (ref 1.2–3.4)
LYMPHOCYTES # BLD AUTO: 15.8 % — LOW (ref 20.5–51.1)
MAGNESIUM SERPL-MCNC: 2.2 MG/DL — SIGNIFICANT CHANGE UP (ref 1.8–2.4)
MCHC RBC-ENTMCNC: 22.6 PG — LOW (ref 27–31)
MCHC RBC-ENTMCNC: 30.6 G/DL — LOW (ref 32–37)
MCV RBC AUTO: 73.9 FL — LOW (ref 80–94)
MONOCYTES # BLD AUTO: 0.38 K/UL — SIGNIFICANT CHANGE UP (ref 0.1–0.6)
MONOCYTES NFR BLD AUTO: 8.8 % — SIGNIFICANT CHANGE UP (ref 1.7–9.3)
NEUTROPHILS # BLD AUTO: 3.06 K/UL — SIGNIFICANT CHANGE UP (ref 1.4–6.5)
NEUTROPHILS NFR BLD AUTO: 71.2 % — SIGNIFICANT CHANGE UP (ref 42.2–75.2)
NRBC # BLD: 0 /100 WBCS — SIGNIFICANT CHANGE UP (ref 0–0)
PHOSPHATE SERPL-MCNC: 3.1 MG/DL — SIGNIFICANT CHANGE UP (ref 2.1–4.9)
PLATELET # BLD AUTO: 168 K/UL — SIGNIFICANT CHANGE UP (ref 130–400)
PMV BLD: 9.9 FL — SIGNIFICANT CHANGE UP (ref 7.4–10.4)
POTASSIUM SERPL-MCNC: 3.7 MMOL/L — SIGNIFICANT CHANGE UP (ref 3.5–5)
POTASSIUM SERPL-SCNC: 3.7 MMOL/L — SIGNIFICANT CHANGE UP (ref 3.5–5)
PROT SERPL-MCNC: 5.3 G/DL — LOW (ref 6–8)
PROTHROM AB SERPL-ACNC: 13.2 SEC — HIGH (ref 9.95–12.87)
RBC # BLD: 3.14 M/UL — LOW (ref 4.7–6.1)
RBC # FLD: 21.4 % — HIGH (ref 11.5–14.5)
SODIUM SERPL-SCNC: 138 MMOL/L — SIGNIFICANT CHANGE UP (ref 135–146)
WBC # BLD: 4.3 K/UL — LOW (ref 4.8–10.8)
WBC # FLD AUTO: 4.3 K/UL — LOW (ref 4.8–10.8)

## 2024-05-29 PROCEDURE — 99233 SBSQ HOSP IP/OBS HIGH 50: CPT

## 2024-05-29 PROCEDURE — 86077 PHYS BLOOD BANK SERV XMATCH: CPT

## 2024-05-29 RX ORDER — SODIUM CHLORIDE 9 MG/ML
1000 INJECTION, SOLUTION INTRAVENOUS
Refills: 0 | Status: DISCONTINUED | OUTPATIENT
Start: 2024-05-29 | End: 2024-05-29

## 2024-05-29 RX ADMIN — ATORVASTATIN CALCIUM 20 MILLIGRAM(S): 80 TABLET, FILM COATED ORAL at 21:35

## 2024-05-29 RX ADMIN — TAMSULOSIN HYDROCHLORIDE 0.4 MILLIGRAM(S): 0.4 CAPSULE ORAL at 21:35

## 2024-05-29 RX ADMIN — SACUBITRIL AND VALSARTAN 1 TABLET(S): 24; 26 TABLET, FILM COATED ORAL at 05:16

## 2024-05-29 RX ADMIN — MONTELUKAST 10 MILLIGRAM(S): 4 TABLET, CHEWABLE ORAL at 11:11

## 2024-05-29 RX ADMIN — PANTOPRAZOLE SODIUM 40 MILLIGRAM(S): 20 TABLET, DELAYED RELEASE ORAL at 17:18

## 2024-05-29 RX ADMIN — SACUBITRIL AND VALSARTAN 1 TABLET(S): 24; 26 TABLET, FILM COATED ORAL at 17:18

## 2024-05-29 RX ADMIN — PANTOPRAZOLE SODIUM 40 MILLIGRAM(S): 20 TABLET, DELAYED RELEASE ORAL at 05:16

## 2024-05-29 NOTE — PHYSICAL THERAPY INITIAL EVALUATION ADULT - GAIT TRAINING, PT EVAL
Increase amb to 150 ft supervision with AAD by d/c                               Stairs: 12 steps 1HR supervision with 1HR by d/c

## 2024-05-29 NOTE — PHYSICAL THERAPY INITIAL EVALUATION ADULT - GENERAL OBSERVATIONS, REHAB EVAL
Pt seen from [id number]. Pt encountered in the b/s chair, + tele, Hakkanese speaking Pt declined to use , asked PT to contact judi Hemphill. Judi Hemphill assisted PT during the evaluation. Pt declined to amb 2* low BP, BP monitored during the session, +Orthostatic, please see vital signs section for details.

## 2024-05-29 NOTE — PROGRESS NOTE ADULT - ASSESSMENT
Assessment and Plan  Case of a 79 year old male patient known to have a history of HTN, CAD, afib on eliquis, CHF, anemia, and PUD who presented to the ED on 05/27 for evaluation of dark stools and fall, found to have acute on chronic anemia. We are consulted for concern of anemia in setting of reported dark stools.      Melena Secondary to PUD  Reported History of Gastric and Colon Cancer s/p Chemotherapy in 2018  Symptomatic Acute on Chronic Microcytic Anemia- Improved  * Hemodynamically stable   * Baseline hemoglobin (prior to admission): Hb 9 in 05/2023  * Hemoglobin trend: Hb 6.9 MCV 71.1 on 05/27 s/p 1 unit pRBC -> Hb 7.8/7.3 on 05/28 -> Hb 7.1 on 05/29  * Coags: INR 1.1 on 05/27; LA 2.5 on 05/27 to 0.9 on 05/28  * Last use of eliquis was on 05/26 AM per family  * JESSICA 05/28 black with brown tinge  * CT AP IC 05/27 no active bleeding  * Family History: no colon cancer  * Status post EGD on 05/28: 1cm clean-based non-bleeding Sunol Class III ulcer was noted at the level of the antrum. Multiple cold forceps biopsies were obtained from the edges of the ulcer for H pylori testing. A 5mm clean-based non-bleeding Sunol Class III ulcer was noted at the level of the antrum.    RECOMMENDATIONS  - Await pathology  - Can advance diet as tolerated  - Recommend completing an 8-week course of PO Protonix 40mg BID to complete an 8-week course, then PPI daily  - Avoid non essential NSAIDs  - If Hb remains stable with no evidence of overt bleeding, would start short acting AC as deemed indicated per team in setting of no active overt bleeding  - Recommend outpatient follow up with GI MAP team for repeat EGD in 8 weeks  - Trend CBC closely BID and transfuse as needed (target Hb >8 in setting of CAD). Maintain active Type and screen  - Monitor BM for recurrent melena   - Please avoid non essential NSAIDs  - If any unstable bleed, please call GI stat      Stable Left Hepatic Cyst  * LFTs 0.6/39/12/9 on 05/27  * Previously noted on CT angio AP IC 05/12/2023  * CT AP IC 05/27/2024 left hepatic lobe cyst  * RUQ US 05/28 sludge, 1.5cm right hepatic cyst    RECOMMENDATIONS  - Trend LFTs  - Outpatient follow up with our [facility] located at [address] [place] [address], [address]. Telephone No: [phone]      Thank you for your consult.  - Please note that plan was communicated with medical team.   - Please reach GI on [phone] during weekdays till 5pm.  - Please call the GI service line after 5pm on Weekdays and anytime on Weekends: [phone].      Indiana Arcos MD  PGY - 4 Gastroenterology Fellow   [facility]

## 2024-05-29 NOTE — PROGRESS NOTE ADULT - ASSESSMENT
80 y/o man w PMHx of HTN, CAD, s/p CABG 2015, AF on Eliquis, HFimpEF, severe LAE/EARL, mi-mod aortic regurg, h/o aortic dissection s/p repair, h/o GIB most recently an ICU admission in 2023 during which he ultimately did not have EGD, had syncopal episode 5/25 w unclear downtime, since then has had melena and worsening weakness/lightheadedness and so presented to the ED where he was found to have Hb 6.9 and received 1 uPRBC.       #GIB  #Syncope   DDx most likely upper GIB w syncope due to GIB. No neuro deficits noted by family and pt had pan CT w no acute findings. Has stopped taking Eliquis 5/26   - previous GIB 2023 > ICU admission in 2023 did not have EGD  - Hb 6.9 >> s/p 1 unit >> Hb 7.8 >> Hb 7.1 >> ordered 1uPRBC  - lactate 2.5 >> 0.9  - Pantoprazole drip   - GI recs appreciated >> s/p EGD >> x2 antral ulcers, start short AC once Hb stable  - no melena overnight > adv diet to clear liquid     #AF on Eliquis  Rate controlled   - Hold Eliquis for GIB   - Continue conversation with family re: desire to restart AC   - No home rate control meds such as CCB or BB   - held metoprolol from admission due to hypotension and orthostatic (+)    #HTN  #CAD, s/p CABG 2015  #HFimpEF, severe LAE/EARL, mi-mod aortic regurg,   #h/o aortic dissection s/p repair  - Trop 14 on admission  - CT Chest Abd/Pelvis noted essentially stable dissection and pt w/o acute back/abd pain.   - continue home meds: Entresto 26mg, Atorvastatin 20mg                                                                       # DVT prophylaxis: SCD   # GI prophylaxis: PPI  # Diet: Clear Liquid    # Activity: Ambulate w assistance   # Code status: FULL CODE

## 2024-05-29 NOTE — PROGRESS NOTE ADULT - ASSESSMENT
78 yo M PMHx CAD s/p CABG, paroxysmal A.fib (eliquis), systolic heart failure with recovered EF, severe LA/RA enlargement, mild-mod aortic regurg, h/o aortic dissection s/p repair, h/o GIB most recently an ICU admission in 2023 during which he ultimately did not have EGD, 2 days ago had syncopal episode w unclear downtime, since then has had melena and worsening weakness/lightheadedness and so presented to the ED where he was found to have Hb 6.9.    Acute blood loss anemia on chronic anemia:   Upper GI bleeding: patient with melena  Hb 6.9, baseline ~9  CT abdomen: neg for acute GIB. No evidence of hematoma.  JESSICA + melena. s/p 1 packed RBC transfusion, Hb improved to 7.8  Hold Eliquis, Continue Protonix drip. GI plan for EGD today  Monitor Hb      Syncope:   Likely from severe anemia, orthostatic hypotension.   EKG showed Atrial Fibrillation with NST changes. Troponin 14.   Continue telemetry.     Hx of Aortic dissection  CT abdomen shows: Stable short segment aortic dissection originating immediately after the left subclavian artery takeoff from the aortic arch extending only a few centimeters along the aortic arch. Dissection flap does not extend into the descending thoracic aorta or into any branch vessels.  Mild increase in diameter of the descending thoracic aorta with no involvement of dissection.   Seen by vascular surgery, outpatient follow up.     Chronic Atrial Fibrillation:   Eliquis on hold (it was held two weeks ago per his PCP).   Metoprolol held, resume if BP stable.     CAD s/p CABG:   Stable, no chest pain,   HFimpEF: continue Entresto.     DVT ppx: SCD  GI ppx: PPI  #Progress Note Handoff:  Pending (specify): EGD  Family discussion:  Disposition: Home. 80 yo M PMHx CAD s/p CABG, paroxysmal A.fib (eliquis), systolic heart failure with recovered EF, severe LA/RA enlargement, mild-mod aortic regurg, h/o aortic dissection s/p repair, h/o GIB most recently an ICU admission in 2023 during which he ultimately did not have EGD, 2 days ago had syncopal episode w unclear downtime, since then has had melena and worsening weakness/lightheadedness and so presented to the ED where he was found to have Hb 6.9.    Acute blood loss anemia on chronic anemia:   Upper GI bleeding: patient with melena  -Hb 6.9, baseline ~9  -CT abdomen: neg for acute GIB. No evidence of hematoma.  -JESSICA + melena. s/p 1 packed RBC transfusion, Hb improved to 7.8 then decreased to 7.1 Pt received another unit PRBC today  -s/p EGD, showed two clean based antral ulcers  -monitor overnight after second transfusion  -family wanted to defer decision regarding a/c to cardiologist. Cardio recommended restarting ac in 1 week  Monitor Hb      Syncope:   -Likely from severe anemia, orthostatic hypotension.   -EKG showed Atrial Fibrillation with NST changes. Troponin 14.   -can dc telemetry    Hx of Aortic dissection  -CT abdomen shows: Stable short segment aortic dissection originating immediately after the left subclavian artery takeoff from the aortic arch extending only a few centimeters along the aortic arch. Dissection flap does not extend into the descending thoracic aorta or into any branch vessels.  -Mild increase in diameter of the descending thoracic aorta with no involvement of dissection.   -Seen by vascular surgery, outpatient follow up.     Chronic Atrial Fibrillation:   -Eliquis on hold (it was held two weeks ago per pt)  -Metoprolol held, resume if BP stable.     CAD s/p CABG:   -Stable, no chest pain,   -HFrecoeveredpEF: continue Entresto.     DVT ppx: SCD  I ppx: PPI    #Progress Note Handoff:  Pending (specify): monitor bleed  Family discussion: as above with wife and daughter. Daughter provided translation  Disposition: Home.

## 2024-05-29 NOTE — PHYSICAL THERAPY INITIAL EVALUATION ADULT - PERTINENT HX OF CURRENT PROBLEM, REHAB EVAL
78 yo M with hx of CAD s/p CABG, Paroxysmal A.fib (eliquis), HFimpEF, severe LAE/EARL, mi-mod aortic regurg, h/o aortic dissection s/p repair, h/o GIB most recently an ICU admission in 2023 during which he ultimately did not have EGD, 2 days ago had syncopal episode w unclear downtime, since then has had melena and worsening weakness/lightheadedness and so presented to the ED where he was found to have Hb 6.9.

## 2024-05-30 ENCOUNTER — TRANSCRIPTION ENCOUNTER (OUTPATIENT)
Age: 79
End: 2024-05-30

## 2024-05-30 LAB
ALBUMIN SERPL ELPH-MCNC: 3.6 G/DL — SIGNIFICANT CHANGE UP (ref 3.5–5.2)
ALP SERPL-CCNC: 42 U/L — SIGNIFICANT CHANGE UP (ref 30–115)
ALT FLD-CCNC: 7 U/L — SIGNIFICANT CHANGE UP (ref 0–41)
ANION GAP SERPL CALC-SCNC: 9 MMOL/L — SIGNIFICANT CHANGE UP (ref 7–14)
AST SERPL-CCNC: 10 U/L — SIGNIFICANT CHANGE UP (ref 0–41)
BASOPHILS # BLD AUTO: 0.03 K/UL — SIGNIFICANT CHANGE UP (ref 0–0.2)
BASOPHILS NFR BLD AUTO: 0.7 % — SIGNIFICANT CHANGE UP (ref 0–1)
BILIRUB SERPL-MCNC: 0.8 MG/DL — SIGNIFICANT CHANGE UP (ref 0.2–1.2)
BUN SERPL-MCNC: 15 MG/DL — SIGNIFICANT CHANGE UP (ref 10–20)
CALCIUM SERPL-MCNC: 7.8 MG/DL — LOW (ref 8.4–10.5)
CHLORIDE SERPL-SCNC: 107 MMOL/L — SIGNIFICANT CHANGE UP (ref 98–110)
CO2 SERPL-SCNC: 24 MMOL/L — SIGNIFICANT CHANGE UP (ref 17–32)
CREAT SERPL-MCNC: 1 MG/DL — SIGNIFICANT CHANGE UP (ref 0.7–1.5)
EGFR: 78 ML/MIN/1.73M2 — SIGNIFICANT CHANGE UP
EOSINOPHIL # BLD AUTO: 0.18 K/UL — SIGNIFICANT CHANGE UP (ref 0–0.7)
EOSINOPHIL NFR BLD AUTO: 4.2 % — SIGNIFICANT CHANGE UP (ref 0–8)
FERRITIN SERPL-MCNC: 18 NG/ML — LOW (ref 30–400)
GLUCOSE SERPL-MCNC: 98 MG/DL — SIGNIFICANT CHANGE UP (ref 70–99)
HCT VFR BLD CALC: 25.5 % — LOW (ref 42–52)
HCT VFR BLD CALC: 28.5 % — LOW (ref 42–52)
HGB BLD-MCNC: 8 G/DL — LOW (ref 14–18)
HGB BLD-MCNC: 8.7 G/DL — LOW (ref 14–18)
IMM GRANULOCYTES NFR BLD AUTO: 0.5 % — HIGH (ref 0.1–0.3)
INR BLD: 1.2 RATIO — SIGNIFICANT CHANGE UP (ref 0.65–1.3)
IRON SATN MFR SERPL: 20 UG/DL — LOW (ref 35–150)
IRON SATN MFR SERPL: 6 % — LOW (ref 15–50)
LYMPHOCYTES # BLD AUTO: 0.67 K/UL — LOW (ref 1.2–3.4)
LYMPHOCYTES # BLD AUTO: 15.5 % — LOW (ref 20.5–51.1)
MAGNESIUM SERPL-MCNC: 2.1 MG/DL — SIGNIFICANT CHANGE UP (ref 1.8–2.4)
MCHC RBC-ENTMCNC: 23.6 PG — LOW (ref 27–31)
MCHC RBC-ENTMCNC: 23.8 PG — LOW (ref 27–31)
MCHC RBC-ENTMCNC: 30.5 G/DL — LOW (ref 32–37)
MCHC RBC-ENTMCNC: 31.4 G/DL — LOW (ref 32–37)
MCV RBC AUTO: 75.2 FL — LOW (ref 80–94)
MCV RBC AUTO: 78.1 FL — LOW (ref 80–94)
MONOCYTES # BLD AUTO: 0.46 K/UL — SIGNIFICANT CHANGE UP (ref 0.1–0.6)
MONOCYTES NFR BLD AUTO: 10.6 % — HIGH (ref 1.7–9.3)
NEUTROPHILS # BLD AUTO: 2.96 K/UL — SIGNIFICANT CHANGE UP (ref 1.4–6.5)
NEUTROPHILS NFR BLD AUTO: 68.5 % — SIGNIFICANT CHANGE UP (ref 42.2–75.2)
NRBC # BLD: 0 /100 WBCS — SIGNIFICANT CHANGE UP (ref 0–0)
NRBC # BLD: 0 /100 WBCS — SIGNIFICANT CHANGE UP (ref 0–0)
PLATELET # BLD AUTO: 168 K/UL — SIGNIFICANT CHANGE UP (ref 130–400)
PLATELET # BLD AUTO: 169 K/UL — SIGNIFICANT CHANGE UP (ref 130–400)
PMV BLD: 10.3 FL — SIGNIFICANT CHANGE UP (ref 7.4–10.4)
PMV BLD: 9.6 FL — SIGNIFICANT CHANGE UP (ref 7.4–10.4)
POTASSIUM SERPL-MCNC: 3.6 MMOL/L — SIGNIFICANT CHANGE UP (ref 3.5–5)
POTASSIUM SERPL-SCNC: 3.6 MMOL/L — SIGNIFICANT CHANGE UP (ref 3.5–5)
PROT SERPL-MCNC: 5.4 G/DL — LOW (ref 6–8)
PROTHROM AB SERPL-ACNC: 13.7 SEC — HIGH (ref 9.95–12.87)
RBC # BLD: 3.39 M/UL — LOW (ref 4.7–6.1)
RBC # BLD: 3.65 M/UL — LOW (ref 4.7–6.1)
RBC # FLD: 21.7 % — HIGH (ref 11.5–14.5)
RBC # FLD: 22.3 % — HIGH (ref 11.5–14.5)
SODIUM SERPL-SCNC: 140 MMOL/L — SIGNIFICANT CHANGE UP (ref 135–146)
SURGICAL PATHOLOGY STUDY: SIGNIFICANT CHANGE UP
TIBC SERPL-MCNC: 325 UG/DL — SIGNIFICANT CHANGE UP (ref 220–430)
TRANSFERRIN SERPL-MCNC: 266 MG/DL — SIGNIFICANT CHANGE UP (ref 200–360)
UIBC SERPL-MCNC: 305 UG/DL — SIGNIFICANT CHANGE UP (ref 110–370)
WBC # BLD: 4.32 K/UL — LOW (ref 4.8–10.8)
WBC # BLD: 4.7 K/UL — LOW (ref 4.8–10.8)
WBC # FLD AUTO: 4.32 K/UL — LOW (ref 4.8–10.8)
WBC # FLD AUTO: 4.7 K/UL — LOW (ref 4.8–10.8)

## 2024-05-30 PROCEDURE — 99232 SBSQ HOSP IP/OBS MODERATE 35: CPT

## 2024-05-30 RX ORDER — OMEPRAZOLE 10 MG/1
1 CAPSULE, DELAYED RELEASE ORAL
Refills: 0 | DISCHARGE

## 2024-05-30 RX ORDER — DOCUSATE SODIUM 100 MG
1 CAPSULE ORAL
Refills: 0 | DISCHARGE

## 2024-05-30 RX ORDER — ENOXAPARIN SODIUM 100 MG/ML
90 INJECTION SUBCUTANEOUS EVERY 12 HOURS
Refills: 0 | Status: DISCONTINUED | OUTPATIENT
Start: 2024-05-30 | End: 2024-05-31

## 2024-05-30 RX ORDER — IRON SUCROSE 20 MG/ML
200 INJECTION, SOLUTION INTRAVENOUS EVERY 24 HOURS
Refills: 0 | Status: DISCONTINUED | OUTPATIENT
Start: 2024-05-30 | End: 2024-05-31

## 2024-05-30 RX ADMIN — TAMSULOSIN HYDROCHLORIDE 0.4 MILLIGRAM(S): 0.4 CAPSULE ORAL at 22:25

## 2024-05-30 RX ADMIN — SACUBITRIL AND VALSARTAN 1 TABLET(S): 24; 26 TABLET, FILM COATED ORAL at 05:29

## 2024-05-30 RX ADMIN — ENOXAPARIN SODIUM 90 MILLIGRAM(S): 100 INJECTION SUBCUTANEOUS at 11:56

## 2024-05-30 RX ADMIN — SACUBITRIL AND VALSARTAN 1 TABLET(S): 24; 26 TABLET, FILM COATED ORAL at 17:37

## 2024-05-30 RX ADMIN — IRON SUCROSE 110 MILLIGRAM(S): 20 INJECTION, SOLUTION INTRAVENOUS at 11:55

## 2024-05-30 RX ADMIN — MONTELUKAST 10 MILLIGRAM(S): 4 TABLET, CHEWABLE ORAL at 11:56

## 2024-05-30 RX ADMIN — PANTOPRAZOLE SODIUM 40 MILLIGRAM(S): 20 TABLET, DELAYED RELEASE ORAL at 05:29

## 2024-05-30 RX ADMIN — PANTOPRAZOLE SODIUM 40 MILLIGRAM(S): 20 TABLET, DELAYED RELEASE ORAL at 17:37

## 2024-05-30 RX ADMIN — ATORVASTATIN CALCIUM 20 MILLIGRAM(S): 80 TABLET, FILM COATED ORAL at 22:26

## 2024-05-30 NOTE — DISCHARGE NOTE PROVIDER - CARE PROVIDERS DIRECT ADDRESSES
[email or web address]

## 2024-05-30 NOTE — DISCHARGE NOTE PROVIDER - NSDCMRMEDTOKEN_GEN_ALL_CORE_FT
Albuterol (Eqv-ProAir HFA) 90 mcg/inh inhalation aerosol: 2 puff(s) inhaled every 6 hours as needed for  shortness of breath and/or wheezing  atorvastatin 20 mg oral tablet: 1 tab(s) orally once a day  benzonatate 100 mg oral capsule: 2 cap(s) orally 3 times a day as needed for  cough  docusate sodium 100 mg oral tablet: 1 tab(s) orally once a day  Eliquis 5 mg oral tablet: 1 tab(s) orally 2 times a day  Entresto 24 mg-26 mg oral tablet: 1 tab(s) orally once a day  Flomax 0.4 mg oral capsule: 1 cap(s) orally once a day (at bedtime)   meclizine 25 mg oral tablet: 1 tab(s) orally once a day  montelukast 10 mg oral tablet: 1 tab(s) orally once a day  omeprazole 40 mg oral delayed release capsule: 1 cap(s) orally once a day   Albuterol (Eqv-ProAir HFA) 90 mcg/inh inhalation aerosol: 2 puff(s) inhaled every 6 hours as needed for  shortness of breath and/or wheezing  atorvastatin 20 mg oral tablet: 1 tab(s) orally once a day  benzonatate 100 mg oral capsule: 2 cap(s) orally 3 times a day as needed for  cough  Eliquis 5 mg oral tablet: 1 tab(s) orally 2 times a day  Entresto 24 mg-26 mg oral tablet: 1 tab(s) orally once a day  Flomax 0.4 mg oral capsule: 1 cap(s) orally once a day (at bedtime)   meclizine 25 mg oral tablet: 1 tab(s) orally once a day  montelukast 10 mg oral tablet: 1 tab(s) orally once a day  pantoprazole 40 mg oral delayed release tablet: 1 tab(s) orally 2 times a day   Albuterol (Eqv-ProAir HFA) 90 mcg/inh inhalation aerosol: 2 puff(s) inhaled every 6 hours as needed for  shortness of breath and/or wheezing  atorvastatin 20 mg oral tablet: 1 tab(s) orally once a day  benzonatate 100 mg oral capsule: 2 cap(s) orally 3 times a day as needed for  cough  Eliquis 5 mg oral tablet: 1 tab(s) orally 2 times a day  Entresto 24 mg-26 mg oral tablet: 1 tab(s) orally once a day  ferrous sulfate 325 mg (65 mg elemental iron) oral tablet: 1 tab(s) orally once a day  Flomax 0.4 mg oral capsule: 1 cap(s) orally once a day (at bedtime)   meclizine 25 mg oral tablet: 1 tab(s) orally once a day  montelukast 10 mg oral tablet: 1 tab(s) orally once a day  pantoprazole 40 mg oral delayed release tablet: 1 tab(s) orally 2 times a day

## 2024-05-30 NOTE — PHARMACOTHERAPY INTERVENTION NOTE - COMMENTS
Recommended to re-evaluate Lovenox, based on HX of GI bleed.  As per Mckayla Yanez,  No active bleeding cleared by GI

## 2024-05-30 NOTE — PROGRESS NOTE ADULT - ASSESSMENT
Assessment and Plan  Case of a 79 year old male patient known to have a history of HTN, CAD, afib on eliquis, CHF, anemia, and PUD who presented to the ED on 05/27 for evaluation of dark stools and fall, found to have acute on chronic anemia. We are consulted for concern of anemia in setting of reported dark stools.      Melena Secondary to PUD  Reported History of Gastric and Colon Cancer s/p Chemotherapy in 2018  Symptomatic Acute on Chronic Microcytic Anemia- Resolved  * Hemodynamically stable   * Baseline hemoglobin (prior to admission): Hb 9 in 05/2023  * Hemoglobin trend: Hb 6.9 MCV 71.1 on 05/27 s/p 1 unit pRBC -> Hb 7.8/7.3 on 05/28 -> Hb 7.1 on 05/29 s/p 1 unit pRBC -> Hb 8 on 05/30  * Coags: INR 1.2 on 05/30; LA 2.5 on 05/27 to 0.9 on 05/28  * Last use of eliquis was on 05/26 AM per family  * JESSICA 05/28 black with brown tinge  * CT AP IC 05/27 no active bleeding  * Family History: no colon cancer  * Status post EGD on 05/28: 1cm clean-based non-bleeding [place] Class III ulcer was noted at the level of the antrum. Multiple cold forceps biopsies were obtained from the edges of the ulcer for H pylori testing. A 5mm clean-based non-bleeding [place] Class III ulcer was noted at the level of the antrum.    RECOMMENDATIONS  - Await pathology  - Advance diet as tolerated in setting of stable Hb and no overt rebleeding. Currently on clears  - Recommend completing an 8-week course of PO Protonix 40mg BID, then PPI daily  - Recommend outpatient follow up with GI MAP team for repeat EGD in 8 weeks  - Can start short acting AC as Hb remains stable with no evidence of overt bleeding if deemed indicated per team and if benefits outweigh risks  - Trend CBC closely BID and transfuse as needed (target Hb >8 in setting of CAD). Maintain active Type and screen  - Monitor BM for recurrent melena   - Please avoid non essential NSAIDs  - If any unstable bleed, please call GI stat      Stable Left Hepatic Cyst  * LFTs 0.6/39/12/9   * Previously noted on CT angio AP IC 05/12/2023  * CT AP IC 05/27/2024 left hepatic lobe cyst  * RUQ US 05/28 sludge, 1.5cm right hepatic cyst    RECOMMENDATIONS  - Trend LFTs  - Outpatient follow up with our [facility] located at [address] [place] [address], [facility]. Telephone No: [phone]      Thank you for your consult.  - Please note that plan was communicated with medical team.   - Please reach GI on [phone] during weekdays till 5pm.  - Please call the GI service line after 5pm on Weekdays and anytime on Weekends: [phone].      Indiana Arcos MD  PGY - 4 Gastroenterology Fellow   [facility]

## 2024-05-30 NOTE — DISCHARGE NOTE PROVIDER - CARE PROVIDER_API CALL
Sanjuana Rojas  Gastroenterology  [address] [facility] [place] [address]  Phone: [phone]  Fax: [phone]  Follow Up Time: 2 weeks   Sanjuana Rojas  Gastroenterology  [address] [facility] [place] [address]  Phone: [phone]  Fax: [phone]  Follow Up Time: 2 weeks    Primary care physician,   Phone: (   )    -  Fax: (   )    -  Follow Up Time: 1 week   Sanjuana Rojas  Gastroenterology  [address] [facility] [place] [address]  Phone: [phone]  Fax: [phone]  Follow Up Time: 2 weeks    Primary care physician,   Phone: (   )    -  Fax: (   )    -  Follow Up Time: 1 week    Harrison Carlton  Vascular Surgery  [address] [place] [address], [address]  [place] [address]  Phone: [phone]  Fax: [phone]  Follow Up Time: 2 weeks

## 2024-05-30 NOTE — DISCHARGE NOTE PROVIDER - HOSPITAL COURSE
Hakka speaking 80 y/o man w PMHx of HTN, CAD, s/p CABG 2015, AF on Eliquis, HFimpEF, severe LAE/EARL, mi-mod aortic regurg, h/o aortic dissection s/p repair, h/o GIB most recently an ICU admission in 2023 during which he ultimately did not have EGD, 2 days ago had syncopal episode w unclear downtime, since then has had melena and worsening weakness/lightheadedness and so presented to the ED where he was found to have Hb 6.9 from discharge value 9.1, ordered for 1uPRBC, pantoprazole drip, admitted to medicine for GIB. On day of admission is mostly experiencing dizziness and weakness, but did not have any episode of melenas. Mild epigastric discomfort/pain.   ROS: No fevers, chills, dysuria, constipation, diarrhea, abd pain. No confusion.     Triage vitals were: 119/64, 58bpm, RR16, 99% on RA, 98.2. Repeat vitals notable for: 131/62, 64bpm. Labs notable for: Hb 6.9 from discharge value 9.1, trop 14, lactate 2.5. Imaging: Had CT Head, C spine, Chest, Abd/Pelvis most notable for stable aortic dissection. Treatments: ordered for 1uPRBC, started pantoprazole drip.      #GIB  #Syncope  #Acute blood loss anemia on chronic anemia  - Hb 6.9 on admission. Hb baseline ~9 from last admission  - DDx most likely upper GIB w syncope due to GIB. No neuro deficits noted by family and pt had pan CT w no acute findings. Has stopped taking Eliquis 5/26   - previous GIB 2023 > ICU admission in 2023 did not have EGD  - Hb 6.9 >> s/p 1u pRBC >> Hb 7.8 >> Hb 7.1 >> s/p 1u pRBC >> Hb 8  - Ferritin 266 wnl, TIBC 325 wnl. Total iron 20 low, Iron Saturation 6 low  - Lactate 2.5 > 0.9  - s/p Pantoprazole drip   - s/p EGD 5/28: x2 antral ulcers, start short AC once Hb stable  - per GI, complete an 8-week course of PO Protonix 40mg BID, then PPI daily    #AF on Eliquis  - Rate controlled   - Held metoprolol from admission due to hypotension and orthostatic (+)  - Resume Eliquis     #HTN  #CAD, s/p CABG 2015  #HFimpEF, severe LAE/EARL, mi-mod aortic regurg,   #h/o aortic dissection s/p repair  - Trop 14 on admission  - CT Chest Abd/Pelvis noted essentially stable dissection and pt w/o acute back/abd pain.   - continue home meds: Entresto 26mg, Atorvastatin 20mg    #Hx of Aortic dissection  - CT abdomen: Stable short segment aortic dissection originating immediately after the left subclavian artery takeoff from the aortic arch extending only a few centimeters along the aortic arch. Dissection flap does not extend into the descending thoracic aorta or into any branch vessels.  - Mild increase in diameter of the descending thoracic aorta with no involvement of dissection.   - Seen by vascular surgery, outpatient follow up.   Hakka speaking 78 y/o man w PMHx of HTN, CAD, s/p CABG 2015, AF on Eliquis, HFimpEF, severe LAE/EARL, mi-mod aortic regurg, h/o aortic dissection s/p repair, h/o GIB most recently an ICU admission in 2023 during which he ultimately did not have EGD, 2 days ago had syncopal episode w unclear downtime, since then has had melena and worsening weakness/lightheadedness and so presented to the ED where he was found to have Hb 6.9 from discharge value 9.1, ordered for 1uPRBC, pantoprazole drip, admitted to medicine for GIB. On day of admission is mostly experiencing dizziness and weakness, but did not have any episode of melenas. Mild epigastric discomfort/pain.   ROS: No fevers, chills, dysuria, constipation, diarrhea, abd pain. No confusion.     Triage vitals were: 119/64, 58bpm, RR16, 99% on RA, 98.2. Repeat vitals notable for: 131/62, 64bpm. Labs notable for: Hb 6.9 from discharge value 9.1, trop 14, lactate 2.5. Imaging: Had CT Head, C spine, Chest, Abd/Pelvis most notable for stable aortic dissection. Treatments: ordered for 1uPRBC, started pantoprazole drip.      #GIB  #Syncope  #Acute blood loss anemia on chronic anemia  - Hb 6.9 on admission. Hb baseline ~9 from last admission  - DDx most likely upper GIB w syncope due to GIB. No neuro deficits noted by family and pt had pan CT w no acute findings. Has stopped taking Eliquis 5/26   - previous GIB 2023 > ICU admission in 2023 did not have EGD  - Hb 6.9 >> s/p 1u pRBC >> Hb 7.8 >> Hb 7.1 >> s/p 1u pRBC >> Hb 8  - Ferritin 266 wnl, TIBC 325 wnl. Total iron 20 low, Iron Saturation 6 low  - Lactate 2.5 > 0.9  - s/p Pantoprazole drip   - s/p EGD 5/28: x2 antral ulcers, start short AC once Hb stable  - per GI, complete an 8-week course of PO Protonix 40mg BID, then PPI daily    #AF on Eliquis  - Rate controlled  - Resume Eliquis     #HTN  #CAD, s/p CABG 2015  #HFimpEF, severe LAE/EARL, mi-mod aortic regurg,   #h/o aortic dissection s/p repair  - Trop 14 on admission  - CT Chest Abd/Pelvis noted essentially stable dissection and pt w/o acute back/abd pain.   - continue home meds: Entresto 26mg, Atorvastatin 20mg    #Hx of Aortic dissection  - CT abdomen: Stable short segment aortic dissection originating immediately after the left subclavian artery takeoff from the aortic arch extending only a few centimeters along the aortic arch. Dissection flap does not extend into the descending thoracic aorta or into any branch vessels.  - Mild increase in diameter of the descending thoracic aorta with no involvement of dissection.   - Seen by vascular surgery, outpatient follow up.   78 y/o man (Hak[place] speaking) w PMHx of HTN, CAD, s/p CABG 2015, AF on Eliquis, HFimpEF, severe LAE/EARL, mi-mod aortic regurg, h/o aortic dissection s/p repair, h/o GIB   presented to the ED where he was found to have Hb 6.9    Treated with PRBC transfusion, pantoprazole drip, admitted to medicine for GIB. On day of admission is mostly experiencing dizziness and weakness, but did not have any episode of melenas. Mild epigastric discomfort/pain.   ROS: No fevers, chills, dysuria, constipation, diarrhea, abd pain. No confusion.     Triage vitals were: 119/64, 58bpm, RR16, 99% on RA, 98.2. Repeat vitals notable for: 131/62, 64bpm. Labs notable for: Hb 6.9 from discharge value 9.1, trop 14, lactate 2.5. Imaging: Had CT Head, C spine, Chest, Abd/Pelvis most notable for stable aortic dissection. Treatments: ordered for 1uPRBC, started pantoprazole drip.      # GIB  # Syncope  # Acute blood loss anemia on chronic anemia  # iron deficiency anemia; ferritin 18  - s/p 2 U PRBC; patient was closely monitored; GI evaluated- - s/p EGD 5/28: x2 antral ulcers  patient was treated with PPI  - per GI, complete an 8-week course of PO Protonix 40mg BID, then PPI daily; Follow up with GI for biopsy results and for repeat EGD  - patient was started on short acting AC; patient was monitored for further bleeding. No evidence of further bleeding; hg was stable  Discussed in detail with family who acted as  regarding follow up plan. Also advised to seek immediate medical care if he noticing lightheadedness, Shortness of breath  or signs of bleeding including vomiting blood, blood in stool or black colored stool  - patient was given venofer and to continue daily supplement  Follo wup with PCP and also with Gi for further work up including colonoscopy    #AF on Eliquis  - Rate controlled  - Resume Eliquis     #HTN  #CAD, s/p CABG 2015  #HFimpEF, severe LAE/EARL, mi-mod aortic regurg,   #h/o aortic dissection s/p repair  - CT Chest Abd/Pelvis noted essentially stable dissection and pt w/o acute back/abd pain.   - continue home meds: Entresto 26mg, Atorvastatin 20mg    #Hx of Aortic dissection  - CT abdomen: Stable short segment aortic dissection originating immediately after the left subclavian artery takeoff from the aortic arch extending only a few centimeters along the aortic arch. Dissection flap does not extend into the descending thoracic aorta or into any branch vessels.  - Mild increase in diameter of the descending thoracic aorta with no involvement of dissection.   - Seen by vascular surgery, outpatient follow up.

## 2024-05-30 NOTE — DISCHARGE NOTE PROVIDER - NSDCFUADDINST_GEN_ALL_CORE_FT
Follow up with vascular surgeon for  Stable type B dissection ; Follow up in office [phone] .

## 2024-05-30 NOTE — DISCHARGE NOTE PROVIDER - PROVIDER TOKENS
PROVIDER:[TOKEN:[[id number]],FOLLOWUP:[2 weeks]] PROVIDER:[TOKEN:[[id number]],FOLLOWUP:[2 weeks]],FREE:[LAST:[Primary care physician],PHONE:[(   )    -],FAX:[(   )    -],FOLLOWUP:[1 week]] PROVIDER:[TOKEN:[[id number]],FOLLOWUP:[2 weeks]],FREE:[LAST:[Primary care physician],PHONE:[(   )    -],FAX:[(   )    -],FOLLOWUP:[1 week]],PROVIDER:[TOKEN:[[id number]],FOLLOWUP:[2 weeks]]

## 2024-05-30 NOTE — PROGRESS NOTE ADULT - ASSESSMENT
78 y/o man w PMHx of HTN, CAD, s/p CABG 2015, AF on Eliquis, HFimpEF, severe LAE/EARL, mi-mod aortic regurg, h/o aortic dissection s/p repair, h/o GIB most recently an ICU admission in 2023 during which he ultimately did not have EGD, had syncopal episode 5/25 w unclear downtime, since then has had melena and worsening weakness/lightheadedness and so presented to the ED where he was found to have Hb 6.9 and received 1 uPRBC.     #GIB  #Syncope  #Acute blood loss anemia on chronic anemia  - Hb 6.9 on admission. Hb baseline ~9 from last admission  - DDx most likely upper GIB w syncope due to GIB. No neuro deficits noted by family and pt had pan CT w no acute findings. Has stopped taking Eliquis 5/26   - previous GIB 2023 > ICU admission in 2023 did not have EGD  - Hb 6.9 >> s/p 1 unit >> Hb 7.8 >> Hb 7.1 >> ordered 1uPRBC  - lactate 2.5 >> 0.9  - s/p Pantoprazole drip   - GI recs appreciated >> s/p EGD >> x2 antral ulcers, start short AC once Hb stable  - Advance diet as tolerated  - per GI, complete an 8-week course of PO Protonix 40mg BID, then PPI daily    #AF on Eliquis  - Rate controlled   - Hold Eliquis for GIB  - No home rate control meds such as CCB or BB   - Held metoprolol from admission due to hypotension and orthostatic (+)  - per GI, can start short acting AC    #HTN  #CAD, s/p CABG 2015  #HFimpEF, severe LAE/EARL, mi-mod aortic regurg,   #h/o aortic dissection s/p repair  - Trop 14 on admission  - CT Chest Abd/Pelvis noted essentially stable dissection and pt w/o acute back/abd pain.   - continue home meds: Entresto 26mg, Atorvastatin 20mg    #Hx of Aortic dissection  - CT abdomen: Stable short segment aortic dissection originating immediately after the left subclavian artery takeoff from the aortic arch extending only a few centimeters along the aortic arch. Dissection flap does not extend into the descending thoracic aorta or into any branch vessels.  - Mild increase in diameter of the descending thoracic aorta with no involvement of dissection.   - Seen by vascular surgery, outpatient follow up.                                                                     # DVT prophylaxis: SCD   # GI prophylaxis: PPI  # Diet: Clear Liquid    # Activity: Ambulate w assistance    80 y/o man w PMHx of HTN, CAD, s/p CABG 2015, AF on Eliquis, HFimpEF, severe LAE/EARL, mi-mod aortic regurg, h/o aortic dissection s/p repair, h/o GIB most recently an ICU admission in 2023 during which he ultimately did not have EGD, had syncopal episode 5/25 w unclear downtime, since then has had melena and worsening weakness/lightheadedness and so presented to the ED where he was found to have Hb 6.9 and received 1 uPRBC.     #GIB  #Syncope  #Acute blood loss anemia on chronic anemia  - Hb 6.9 on admission. Hb baseline ~9 from last admission  - DDx most likely upper GIB w syncope due to GIB. No neuro deficits noted by family and pt had pan CT w no acute findings. Has stopped taking Eliquis 5/26   - previous GIB 2023 > ICU admission in 2023 did not have EGD  - Hb 6.9 >> s/p 1u pRBC >> Hb 7.8 >> Hb 7.1 >> s/p 1u pRBC >> Hb 8  - Ferritin 266 wnl, TIBC 325 wnl. Total iron 20 low, Iron Saturation 6 low  - Lactate 2.5 > 0.9  - s/p Pantoprazole drip   - s/p EGD 5/28: x2 antral ulcers, start short AC once Hb stable  - Advance diet as tolerated  - per GI, complete an 8-week course of PO Protonix 40mg BID, then PPI daily    #AF on Eliquis  - Rate controlled   - Eliquis held for GIB  - No home rate control meds such as CCB or BB   - Held metoprolol from admission due to hypotension and orthostatic (+)  - per GI, can start short acting AC  - 5/30 Start Lovenox 90 BID  - Monitor any bleeding. Monitor CBC    #HTN  #CAD, s/p CABG 2015  #HFimpEF, severe LAE/EARL, mi-mod aortic regurg,   #h/o aortic dissection s/p repair  - Trop 14 on admission  - CT Chest Abd/Pelvis noted essentially stable dissection and pt w/o acute back/abd pain.   - continue home meds: Entresto 26mg, Atorvastatin 20mg    #Hx of Aortic dissection  - CT abdomen: Stable short segment aortic dissection originating immediately after the left subclavian artery takeoff from the aortic arch extending only a few centimeters along the aortic arch. Dissection flap does not extend into the descending thoracic aorta or into any branch vessels.  - Mild increase in diameter of the descending thoracic aorta with no involvement of dissection.   - Seen by vascular surgery, outpatient follow up.                                                                     # DVT prophylaxis: Lovenox  # GI prophylaxis: PPI  # Diet: Clear Liquid    # Activity: Ambulate w assistance    78 y/o man w PMHx of HTN, CAD, s/p CABG 2015, AF on Eliquis, HFimpEF, severe LAE/EARL, mi-mod aortic regurg, h/o aortic dissection s/p repair, h/o GIB most recently an ICU admission in 2023 during which he ultimately did not have EGD, had syncopal episode 5/25 w unclear downtime, since then has had melena and worsening weakness/lightheadedness and so presented to the ED where he was found to have Hb 6.9 and received 1 uPRBC.     #GIB  #Syncope  #Acute blood loss anemia on chronic anemia  - Hb 6.9 on admission. Hb baseline ~9 from last admission  - DDx most likely upper GIB w syncope due to GIB. No neuro deficits noted by family and pt had pan CT w no acute findings. Has stopped taking Eliquis 5/26   - previous GIB 2023 > ICU admission in 2023 did not have EGD  - Hb 6.9 >> s/p 1u pRBC >> Hb 7.8 >> Hb 7.1 >> s/p 1u pRBC >> Hb 8  - Transferrin 266 wnl, TIBC 325 wnl. Total iron 20 low, Iron Saturation 6 low, Ferritin 18 low  - Lactate 2.5 > 0.9  - s/p Pantoprazole drip   - s/p EGD 5/28: x2 antral ulcers, start short AC once Hb stable  - Advance diet as tolerated  - per GI, complete an 8-week course of PO Protonix 40mg BID, then PPI daily    #AF on Eliquis  - Rate controlled   - Eliquis held for GIB  - No home rate control meds such as CCB or BB   - Held metoprolol from admission due to hypotension and orthostatic (+)  - per GI, can start short acting AC  - 5/30 Start Lovenox 90 BID  - Monitor any bleeding. Monitor CBC    #HTN  #CAD, s/p CABG 2015  #HFimpEF, severe LAE/EARL, mi-mod aortic regurg,   #h/o aortic dissection s/p repair  - Trop 14 on admission  - CT Chest Abd/Pelvis noted essentially stable dissection and pt w/o acute back/abd pain.   - continue home meds: Entresto 26mg, Atorvastatin 20mg    #Hx of Aortic dissection  - CT abdomen: Stable short segment aortic dissection originating immediately after the left subclavian artery takeoff from the aortic arch extending only a few centimeters along the aortic arch. Dissection flap does not extend into the descending thoracic aorta or into any branch vessels.  - Mild increase in diameter of the descending thoracic aorta with no involvement of dissection.   - Seen by vascular surgery, outpatient follow up.                                                                     # DVT prophylaxis: Lovenox  # GI prophylaxis: PPI  # Diet: Clear Liquid    # Activity: Ambulate w assistance

## 2024-05-30 NOTE — DISCHARGE NOTE PROVIDER - NSDCCPCAREPLAN_GEN_ALL_CORE_FT
PRINCIPAL DISCHARGE DIAGNOSIS  Diagnosis: Antral ulcer  Assessment and Plan of Treatment: You were found to have GI bleed. You underwent EGD, which showed 2 clean based ulcers in antrum. Please continue protonix twice daily for 8 weeks and then once daily. Follow up with your primary and GI doctor outpatient for further management.  Please call 911 or your doctor or go to emergency room if you experience dizziness, fall, chest pain, shortness of breath, abdominal pain, black stool, GI bleed, weakness.      SECONDARY DISCHARGE DIAGNOSES  Diagnosis: Thoracic aortic aneurysm  Assessment and Plan of Treatment: Please follow up outpatient for fuether management.     PRINCIPAL DISCHARGE DIAGNOSIS  Diagnosis: Antral ulcer  Assessment and Plan of Treatment:   You were found to have Acute blood loss anemia due to GI bleed  You also has iron deficiency anemia due to blood loss.  You were found to have GI bleed. You underwent EGD, which showed 2 clean based ulcers in antrum. Please continue protonix twice daily for 8 weeks and then once daily. Follow up with your primary and GI doctor outpatient for further management.  Contiune iron supplement  -  Follow up with GI for biopsy results and for repeat EGD  - Please  seek immediate medical care if noticing lightheadedness, Shortness of breathor signs of bleeding including vomiting blood, blood in stool or black colored stool  Please call 911 or your doctor or go to emergency room if you experience dizziness, fall, chest pain, shortness of breath, abdominal pain, black stool, GI bleed, weakness.      SECONDARY DISCHARGE DIAGNOSES  Diagnosis: Thoracic aortic aneurysm  Assessment and Plan of Treatment: Please follow up outpatient for fuether management.     PRINCIPAL DISCHARGE DIAGNOSIS  Diagnosis: Antral ulcer  Assessment and Plan of Treatment:   You were found to have Acute blood loss anemia due to GI bleed  You also has iron deficiency anemia due to blood loss.  You were found to have GI bleed. You underwent EGD, which showed 2 clean based ulcers in antrum. Please continue protonix twice daily for 8 weeks and then once daily. Follow up with your primary and GI doctor outpatient for further management.  Contiune iron supplement  -  Follow up with GI for biopsy results and for repeat EGD  - Please  seek immediate medical care if noticing lightheadedness, Shortness of breathor signs of bleeding including vomiting blood, blood in stool or black colored stool  Please call 911 or your doctor or go to emergency room if you experience dizziness, fall, chest pain, shortness of breath, abdominal pain, black stool, GI bleed, weakness.      SECONDARY DISCHARGE DIAGNOSES  Diagnosis: Thoracic aortic aneurysm  Assessment and Plan of Treatment: Please follow up outpatient for further management. Follow up with vascular surgeon

## 2024-05-31 ENCOUNTER — TRANSCRIPTION ENCOUNTER (OUTPATIENT)
Age: 79
End: 2024-05-31

## 2024-05-31 VITALS
DIASTOLIC BLOOD PRESSURE: 57 MMHG | SYSTOLIC BLOOD PRESSURE: 109 MMHG | TEMPERATURE: 99 F | HEART RATE: 72 BPM | OXYGEN SATURATION: 99 % | RESPIRATION RATE: 21 BRPM

## 2024-05-31 LAB
ALBUMIN SERPL ELPH-MCNC: 3.8 G/DL — SIGNIFICANT CHANGE UP (ref 3.5–5.2)
ALP SERPL-CCNC: 47 U/L — SIGNIFICANT CHANGE UP (ref 30–115)
ALT FLD-CCNC: 6 U/L — SIGNIFICANT CHANGE UP (ref 0–41)
ANION GAP SERPL CALC-SCNC: 8 MMOL/L — SIGNIFICANT CHANGE UP (ref 7–14)
AST SERPL-CCNC: 11 U/L — SIGNIFICANT CHANGE UP (ref 0–41)
BASOPHILS # BLD AUTO: 0.03 K/UL — SIGNIFICANT CHANGE UP (ref 0–0.2)
BASOPHILS NFR BLD AUTO: 0.7 % — SIGNIFICANT CHANGE UP (ref 0–1)
BILIRUB SERPL-MCNC: 0.8 MG/DL — SIGNIFICANT CHANGE UP (ref 0.2–1.2)
BLD GP AB SCN SERPL QL: SIGNIFICANT CHANGE UP
BUN SERPL-MCNC: 13 MG/DL — SIGNIFICANT CHANGE UP (ref 10–20)
CALCIUM SERPL-MCNC: 8.3 MG/DL — LOW (ref 8.4–10.5)
CHLORIDE SERPL-SCNC: 105 MMOL/L — SIGNIFICANT CHANGE UP (ref 98–110)
CO2 SERPL-SCNC: 26 MMOL/L — SIGNIFICANT CHANGE UP (ref 17–32)
CREAT SERPL-MCNC: 1.2 MG/DL — SIGNIFICANT CHANGE UP (ref 0.7–1.5)
EGFR: 62 ML/MIN/1.73M2 — SIGNIFICANT CHANGE UP
EOSINOPHIL # BLD AUTO: 0.19 K/UL — SIGNIFICANT CHANGE UP (ref 0–0.7)
EOSINOPHIL NFR BLD AUTO: 4.2 % — SIGNIFICANT CHANGE UP (ref 0–8)
GLUCOSE SERPL-MCNC: 88 MG/DL — SIGNIFICANT CHANGE UP (ref 70–99)
HCT VFR BLD CALC: 25.5 % — LOW (ref 42–52)
HGB BLD-MCNC: 8 G/DL — LOW (ref 14–18)
IMM GRANULOCYTES NFR BLD AUTO: 0.2 % — SIGNIFICANT CHANGE UP (ref 0.1–0.3)
LYMPHOCYTES # BLD AUTO: 0.77 K/UL — LOW (ref 1.2–3.4)
LYMPHOCYTES # BLD AUTO: 17 % — LOW (ref 20.5–51.1)
MCHC RBC-ENTMCNC: 23.8 PG — LOW (ref 27–31)
MCHC RBC-ENTMCNC: 31.4 G/DL — LOW (ref 32–37)
MCV RBC AUTO: 75.9 FL — LOW (ref 80–94)
MONOCYTES # BLD AUTO: 0.47 K/UL — SIGNIFICANT CHANGE UP (ref 0.1–0.6)
MONOCYTES NFR BLD AUTO: 10.4 % — HIGH (ref 1.7–9.3)
NEUTROPHILS # BLD AUTO: 3.06 K/UL — SIGNIFICANT CHANGE UP (ref 1.4–6.5)
NEUTROPHILS NFR BLD AUTO: 67.5 % — SIGNIFICANT CHANGE UP (ref 42.2–75.2)
NRBC # BLD: 0 /100 WBCS — SIGNIFICANT CHANGE UP (ref 0–0)
PLATELET # BLD AUTO: 179 K/UL — SIGNIFICANT CHANGE UP (ref 130–400)
PMV BLD: 10.2 FL — SIGNIFICANT CHANGE UP (ref 7.4–10.4)
POTASSIUM SERPL-MCNC: 4 MMOL/L — SIGNIFICANT CHANGE UP (ref 3.5–5)
POTASSIUM SERPL-SCNC: 4 MMOL/L — SIGNIFICANT CHANGE UP (ref 3.5–5)
PROT SERPL-MCNC: 5.7 G/DL — LOW (ref 6–8)
RBC # BLD: 3.36 M/UL — LOW (ref 4.7–6.1)
RBC # FLD: 22.3 % — HIGH (ref 11.5–14.5)
SODIUM SERPL-SCNC: 139 MMOL/L — SIGNIFICANT CHANGE UP (ref 135–146)
WBC # BLD: 4.53 K/UL — LOW (ref 4.8–10.8)
WBC # FLD AUTO: 4.53 K/UL — LOW (ref 4.8–10.8)

## 2024-05-31 PROCEDURE — 99239 HOSP IP/OBS DSCHRG MGMT >30: CPT

## 2024-05-31 RX ORDER — FERROUS SULFATE 325(65) MG
1 TABLET ORAL
Qty: 30 | Refills: 0
Start: 2024-05-31 | End: 2024-06-29

## 2024-05-31 RX ORDER — PANTOPRAZOLE SODIUM 20 MG/1
1 TABLET, DELAYED RELEASE ORAL
Qty: 60 | Refills: 0
Start: 2024-05-31 | End: 2024-06-29

## 2024-05-31 RX ADMIN — PANTOPRAZOLE SODIUM 40 MILLIGRAM(S): 20 TABLET, DELAYED RELEASE ORAL at 05:20

## 2024-05-31 RX ADMIN — SACUBITRIL AND VALSARTAN 1 TABLET(S): 24; 26 TABLET, FILM COATED ORAL at 05:20

## 2024-05-31 RX ADMIN — IRON SUCROSE 110 MILLIGRAM(S): 20 INJECTION, SOLUTION INTRAVENOUS at 09:46

## 2024-05-31 RX ADMIN — ENOXAPARIN SODIUM 90 MILLIGRAM(S): 100 INJECTION SUBCUTANEOUS at 05:20

## 2024-05-31 NOTE — PROGRESS NOTE ADULT - ASSESSMENT
Assessment and Plan  Case of a 79 year old male patient known to have a history of HTN, CAD, afib on eliquis, CHF, anemia, and PUD who presented to the ED on 05/27 for evaluation of dark stools and fall, found to have acute on chronic anemia. We are consulted for concern of anemia in setting of reported dark stools.      Melena Secondary to PUD  Reported History of Gastric and Colon Cancer s/p Chemotherapy in 2018  Symptomatic Acute on Chronic Microcytic Anemia- Resolved  * Hemodynamically stable   * Baseline hemoglobin (prior to admission): Hb 9 in 05/2023  * Hemoglobin trend: Hb 6.9 MCV 71.1 on 05/27 s/p 1 unit pRBC -> Hb 7.8/7.3 on 05/28 -> Hb 7.1 on 05/29 s/p 1 unit pRBC -> Hb 8 on 05/30 -> Hb 8 on 05/31  * JESSICA 05/28 black with brown tinge  * CT AP IC 05/27 no active bleeding  * Status post EGD on 05/28: 1cm clean-based non-bleeding [place] Class III ulcer was noted at the level of the antrum. Multiple cold forceps biopsies were obtained from the edges of the ulcer for H pylori testing. A 5mm clean-based non-bleeding [place] Class III ulcer was noted at the level of the antrum.    RECOMMENDATIONS  - Monitor BM for recurrent melena and Trend CBC   - Can resume necessary AC as Hb remains stable with no evidence of overt bleeding if deemed indicated per team and if benefits outweigh risks  - Advance diet as tolerated in setting of stable Hb and no overt rebleeding  - Recommend completing an 8-week course of PO Protonix 40mg BID, then PPI daily  - Recommend outpatient follow up with GI MAP team for repeat EGD in 8 weeks  - Trend CBC closely BID and transfuse as needed (target Hb >8 in setting of CAD). Maintain active Type and screen  - Please avoid non essential NSAIDs  - If any unstable bleed, please call GI stat      Stable Left Hepatic Cyst  * LFTs 0.6/39/12/9   * Previously noted on CT angio AP IC 05/12/2023  * CT AP IC 05/27/2024 left hepatic lobe cyst  * RUQ US 05/28 sludge, 1.5cm right hepatic cyst    RECOMMENDATIONS  - Trend LFTs  - Outpatient follow up with our [facility] located at [address] [place] [address], [id number]. Telephone No: [phone]      Thank you for your consult.  - Please note that plan was communicated with medical team.   - Please reach GI on [phone] during weekdays till 5pm.  - Please call the GI service line after 5pm on Weekdays and anytime on Weekends: [phone].      Indiana Arcos MD  PGY - 4 Gastroenterology Fellow   [facility]

## 2024-05-31 NOTE — DISCHARGE NOTE NURSING/CASE MANAGEMENT/SOCIAL WORK - PATIENT PORTAL LINK FT
You can access the FollowMyHealth Patient Portal offered by [facility] by registering at the following website: [email or web address]. By joining [facility]’s FollowMyHealth portal, you will also be able to view your health information using other applications (apps) compatible with our system.

## 2024-05-31 NOTE — PROGRESS NOTE ADULT - REASON FOR ADMISSION
syncope, melena

## 2024-05-31 NOTE — PROGRESS NOTE ADULT - ATTENDING COMMENTS
Discussed with patient and also with family who helped to interpret  Patient feels well; wants to advance diet and wants to go home  start on short acting anticoagulant; monitor cbc; monitor for bleeding  check iron studies  start on venofer  continue PPI; follow up with GI for biopsy results and follow up EGD  Discharge plan tomorrow if continues to be stable.
EGD performed on 5/28 revealing clean based gastric ulcers, no evidence of H pylori on path. Recommend PPI BID. Follow up in GI clinic and repeat EGD in 2-3 months.
EGD performed yesterday revealing clean based gastric ulcers. Biopsies taken. Recommend PPI bid. Recommend outpt GI follow up and repeat EGD in 2 months to assess healing.
EGD performed on 5/28/24 revealing clean based gastric ulcers, path pending. No further overt GI bleeding reported. Recommend PPI BID. Follow up in GI clinic for repeat EGD in 2-3 months.

## 2024-05-31 NOTE — PROGRESS NOTE ADULT - PROVIDER SPECIALTY LIST ADULT
Internal Medicine
Internal Medicine
Gastroenterology
Internal Medicine
Internal Medicine
Gastroenterology
Gastroenterology
Hospitalist
Internal Medicine

## 2024-05-31 NOTE — PROGRESS NOTE ADULT - ASSESSMENT
# GIB  # Syncope  # Acute blood loss anemia on chronic anemia  # iron deficiency anemia; ferritin 18  - s/p 2 U PRBC; patient was closely monitored; GI evaluated- - s/p EGD 5/28: x2 antral ulcers  patient was treated with PPI  - per GI, complete an 8-week course of PO Protonix 40mg BID, then PPI daily; Follow up with GI for biopsy results and for repeat EGD  - patient was started on short acting AC; patient was monitored for further bleeding. No evidence of further bleeding; hg was stable  Discussed in detail with family who acted as  regarding follow up plan. Also advised to seek immediate medical care if he noticing lightheadedness, Shortness of breath  or signs of bleeding including vomiting blood, blood in stool or black colored stool  - patient was given venofer and to continue daily supplement  Follo wup with PCP and also with Gi for further work up including colonoscopy    #AF on Eliquis  - Rate controlled  - Resume Eliquis     #HTN  #CAD, s/p CABG 2015  #HFimpEF, severe LAE/EARL, mi-mod aortic regurg,   - continue home meds: Entresto 26mg, Atorvastatin 20mg    #Hx of Aortic dissection type B- stable  #h/o aortic dissection s/p repair  - CT Chest Abd/Pelvis noted essentially stable dissection and pt w/o acute back/abd pain.   - Seen by vascular surgery, outpatient follow up.    Discharge plan discussed with family

## 2024-05-31 NOTE — PROGRESS NOTE ADULT - SUBJECTIVE AND OBJECTIVE BOX
24H events:    Patient is a 79y old Male who presents with a chief complaint of syncope, melena (28 May 2024 13:05)    Primary diagnosis of GIB (gastrointestinal bleeding)    Today is hospital day 2d. This morning patient was seen and examined at bedside, resting comfortably in bed.    Patient was hypotensive to 88/53 and Hb dropped to 7.1. Patient tolerating liquid diet, voiding appropriately with appropriate bowel movements.     Code Status: full code    PAST MEDICAL & SURGICAL HISTORY  HTN (hypertension)    Aortic dissection    Hypertension    CAD (coronary artery disease)    S/P CABG x 1    Unspecified atrial fibrillation    Chronic systolic congestive heart failure    Asthma    S/P aortic dissection repair      SOCIAL HISTORY:  Social History:      ALLERGIES:  No Known Allergies    MEDICATIONS:  STANDING MEDICATIONS  atorvastatin 20 milliGRAM(s) Oral at bedtime  montelukast 10 milliGRAM(s) Oral daily  pantoprazole    Tablet 40 milliGRAM(s) Oral two times a day  sacubitril 24 mG/valsartan 26 mG 1 Tablet(s) Oral two times a day  tamsulosin 0.4 milliGRAM(s) Oral at bedtime    PRN MEDICATIONS  acetaminophen     Tablet .. 650 milliGRAM(s) Oral every 6 hours PRN  albuterol    90 MICROgram(s) HFA Inhaler 2 Puff(s) Inhalation every 6 hours PRN  benzonatate 100 milliGRAM(s) Oral three times a day PRN  melatonin 3 milliGRAM(s) Oral at bedtime PRN      ROS:  General: Denies chills  HEENT: Denies headaches  CV: Denies chest pain, palpitations  Pulm: Denies cough, SOB  GI: Denies N/V/D  Neuro: Endorses weakness    VITALS:   T(F): 97.9  HR: 77  BP: 101/52  RR: 18  SpO2: 98%    PHYSICAL EXAM:  GENERAL: NAD, lying in bed comfortably, cooperative, elderly  HEAD: Normocephalic  HEART: Regular rate and rhythm, normal S1/S2, no murmurs  LUNGS: No acute respiratory distress, clear b/l breath sounds  ABDOMEN:  soft, nontender  EXTREMITIES: no rashes, extremities warm/dry, no edema, ulcerations or ecchymosis      LABS:                        7.1    4.30  )-----------( 168      ( 29 May 2024 06:37 )             23.2     05-29    138  |  106  |  17  ----------------------------<  99  3.7   |  25  |  1.0    Ca    7.9<L>      29 May 2024 06:37  Phos  3.1     05-29  Mg     2.2     05-29    TPro  5.3<L>  /  Alb  3.5  /  TBili  1.0  /  DBili  x   /  AST  10  /  ALT  7   /  AlkPhos  39  05-29    PT/INR - ( 29 May 2024 06:37 )   PT: 13.20 sec;   INR: 1.16 ratio         PTT - ( 27 May 2024 15:30 )  PTT:29.8 sec  Urinalysis Basic - ( 29 May 2024 06:37 )    Color: x / Appearance: x / SG: x / pH: x  Gluc: 99 mg/dL / Ketone: x  / Bili: x / Urobili: x   Blood: x / Protein: x / Nitrite: x   Leuk Esterase: x / RBC: x / WBC x   Sq Epi: x / Non Sq Epi: x / Bacteria: x      Urinalysis with Rflx Culture (collected 27 May 2024 18:15)          RADIOLOGY:    RADIOLOGY    US ABDOMEN RUQ  IMPRESSION:  Left hepatic cyst measuring 1.5 cm.  Gallbladder sludge
24H events:    Patient is a 79y old Male who presents with a chief complaint of syncope, melena (30 May 2024 08:23)    Primary diagnosis of GIB (gastrointestinal bleeding)    Today is hospital day 3d. This morning patient was seen and examined at bedside, resting comfortably in bed.    No acute or major events overnight.    Code Status: Full    PAST MEDICAL & SURGICAL HISTORY  HTN (hypertension)    Aortic dissection    Hypertension    CAD (coronary artery disease)    S/P CABG x 1    Unspecified atrial fibrillation    Chronic systolic congestive heart failure    Asthma    S/P aortic dissection repair    ALLERGIES:  No Known Allergies    MEDICATIONS:  STANDING MEDICATIONS  atorvastatin 20 milliGRAM(s) Oral at bedtime  iron sucrose IVPB 200 milliGRAM(s) IV Intermittent every 24 hours  montelukast 10 milliGRAM(s) Oral daily  pantoprazole    Tablet 40 milliGRAM(s) Oral two times a day  sacubitril 24 mG/valsartan 26 mG 1 Tablet(s) Oral two times a day  tamsulosin 0.4 milliGRAM(s) Oral at bedtime    PRN MEDICATIONS  acetaminophen     Tablet .. 650 milliGRAM(s) Oral every 6 hours PRN  albuterol    90 MICROgram(s) HFA Inhaler 2 Puff(s) Inhalation every 6 hours PRN  benzonatate 100 milliGRAM(s) Oral three times a day PRN  melatonin 3 milliGRAM(s) Oral at bedtime PRN    VITALS:   T(F): 98.3  HR: 61  BP: 107/53  RR: 20  SpO2: 98%    PHYSICAL EXAM:  GENERAL:   ( x ) NAD, lying in bed comfortably     (  ) obtunded     (  ) lethargic     (  ) somnolent    HEAD:   ( x ) Atraumatic     (  ) hematoma     (  ) laceration (specify location:       )     NECK:  ( x ) Supple     (  ) neck stiffness     (  ) nuchal rigidity     (  )  no JVD     (  ) JVD present ( -- cm)    HEART:  Rate -->     ( x ) normal rate     (  ) bradycardic     (  ) tachycardic  Rhythm -->     ( x ) regular     (  ) regularly irregular     (  ) irregularly irregular  Murmurs -->     ( x ) normal s1s2     (  ) systolic murmur     (  ) diastolic murmur     (  ) continuous murmur      (  ) S3 present     (  ) S4 present    LUNGS:   ( x ) Unlabored respirations     (  ) tachypnea  ( x ) B/L air entry     (  ) decreased breath sounds in:  (location     )    ( x ) no adventitious sound     (  ) crackles     (  ) wheezing      (  ) rhonchi      (specify location:       )  (  ) chest wall tenderness (specify location:       )    ABDOMEN:   ( x ) Soft     (  ) tense   |   ( x ) nondistended     (  ) distended   |   (  ) +BS     (  ) hypoactive bowel sounds     (  ) hyperactive bowel sounds  ( x ) nontender     (  ) RUQ tenderness     (  ) RLQ tenderness     (  ) LLQ tenderness     (  ) epigastric tenderness     (  ) diffuse tenderness  (  ) Splenomegaly      (  ) Hepatomegaly      (  ) Jaundice     (  ) ecchymosis     EXTREMITIES: no edema  (  ) Normal     (  ) Rash     (  ) ecchymosis     (  ) varicose veins      (  ) pitting edema     (  ) non-pitting edema   (  ) ulceration     (  ) gangrene:     (location:     )    NERVOUS SYSTEM: awake, alert  (  ) A&Ox3     (  ) confused     (  ) lethargic    LABS:                        8.0    4.32  )-----------( 168      ( 30 May 2024 06:07 )             25.5     05-30    140  |  107  |  15  ----------------------------<  98  3.6   |  24  |  1.0    Ca    7.8<L>      30 May 2024 06:07  Phos  3.1     05-29  Mg     2.1     05-30    TPro  5.4<L>  /  Alb  3.6  /  TBili  0.8  /  DBili  x   /  AST  10  /  ALT  7   /  AlkPhos  42  05-30    PT/INR - ( 30 May 2024 06:07 )   PT: 13.70 sec;   INR: 1.20 ratio           Urinalysis Basic - ( 30 May 2024 06:07 )    Color: x / Appearance: x / SG: x / pH: x  Gluc: 98 mg/dL / Ketone: x  / Bili: x / Urobili: x   Blood: x / Protein: x / Nitrite: x   Leuk Esterase: x / RBC: x / WBC x   Sq Epi: x / Non Sq Epi: x / Bacteria: x            Urinalysis with Rflx Culture (collected 27 May 2024 18:15)    
CHIEF COMPLAINT:    Patient is a 79y old  Male who presents with a chief complaint of syncope, melena     INTERVAL HPI/OVERNIGHT EVENTS:    Patient seen and examined at bedside. No acute overnight events occurred.    ROS: Denies SOB, chest pain. All other systems are negative.    Medications:  Standing  atorvastatin 20 milliGRAM(s) Oral at bedtime  montelukast 10 milliGRAM(s) Oral daily  pantoprazole    Tablet 40 milliGRAM(s) Oral two times a day  sacubitril 24 mG/valsartan 26 mG 1 Tablet(s) Oral two times a day  tamsulosin 0.4 milliGRAM(s) Oral at bedtime    PRN Meds  acetaminophen     Tablet .. 650 milliGRAM(s) Oral every 6 hours PRN  albuterol    90 MICROgram(s) HFA Inhaler 2 Puff(s) Inhalation every 6 hours PRN  benzonatate 100 milliGRAM(s) Oral three times a day PRN  melatonin 3 milliGRAM(s) Oral at bedtime PRN        Vital Signs:    T(F): 97.9 (24 @ 13:02), Max: 98.2 (24 @ 16:48)  HR: 82 (24 @ 13:02) (62 - 82)  BP: 101/59 (24 @ 13:02) (88/53 - 125/74)  RR: 16 (24 @ 13:02) (15 - 18)  SpO2: 97% (24 @ 13:02) (97% - 99%)  I&O's Summary    28 May 2024 07:  -  29 May 2024 07:00  --------------------------------------------------------  IN: 472 mL / OUT: 1300 mL / NET: -828 mL    29 May 2024 07:  -  29 May 2024 14:11  --------------------------------------------------------  IN: 325 mL / OUT: 250 mL / NET: 75 mL      Daily     Daily Weight in k (29 May 2024 04:46)  CAPILLARY BLOOD GLUCOSE          PHYSICAL EXAM:  GENERAL:  NAD  SKIN: No rashes or lesions  HEENT: Atraumatic. Normocephalic. Anicteric  NECK:  No JVD.   PULMONARY: Clear to ausculation bilaterally. No wheezing. No rales  CVS: Normal S1, S2. Regular rate and rhythm. No murmurs.  ABDOMEN/GI: Soft, Nontender, Nondistended; Bowel sounds are present  EXTREMITIES:  No edema B/L LE.  NEUROLOGIC:  No motor deficit.  PSYCH: Alert & oriented x 3, normal affect      LABS:                        7.1    4.30  )-----------( 168      ( 29 May 2024 06:37 )             23.2         138  |  106  |  17  ----------------------------<  99  3.7   |  25  |  1.0    Ca    7.9<L>      29 May 2024 06:37  Phos  3.1       Mg     2.2         TPro  5.3<L>  /  Alb  3.5  /  TBili  1.0  /  DBili  x   /  AST  10  /  ALT  7   /  AlkPhos  39      PT/INR - ( 29 May 2024 06:37 )   PT: 13.20 sec;   INR: 1.16 ratio         PTT - ( 27 May 2024 15:30 )  PTT:29.8 sec        Urinalysis with Rflx Culture (collected 27 May 2024 18:15)        RADIOLOGY & ADDITIONAL TESTS:  Imaging or report Personally Reviewed:  [ ] YES  [ ] NO -->no new images    Telemetry reviewed independently - off telemetry  EKG reviewed independently -->no new EKGs    Consultant(s) Notes Reviewed:  [ ] YES  [ ] NO  Care Discussed with Consultants/Other Providers [ ] YES  [ ] NO    Case discussed with resident  Care discussed with pt        
HPI  Patient is a 79y old Male who presents with a chief complaint of syncope, melena (30 May 2024 15:29)    Currently admitted to medicine with the primary diagnosis of Antral ulcer       Today is hospital day 4d.     INTERVAL HPI / OVERNIGHT EVENTS:  Patient was seen and examined at bedside  family present at bedside and helped with interpretation  Patient Feels well  No new complaints  Denies any complains of chest pain or shortness of breath  Denies any abdominal pain/nausea/vomiting        PAST MEDICAL & SURGICAL HISTORY  HTN (hypertension)    Aortic dissection    Hypertension    CAD (coronary artery disease)    S/P CABG x 1    Unspecified atrial fibrillation    Chronic systolic congestive heart failure    Asthma    S/P aortic dissection repair      ALLERGIES  No Known Allergies    MEDICATIONS  STANDING MEDICATIONS  atorvastatin 20 milliGRAM(s) Oral at bedtime  enoxaparin Injectable 90 milliGRAM(s) SubCutaneous every 12 hours  iron sucrose IVPB 200 milliGRAM(s) IV Intermittent every 24 hours  montelukast 10 milliGRAM(s) Oral daily  pantoprazole    Tablet 40 milliGRAM(s) Oral two times a day  sacubitril 24 mG/valsartan 26 mG 1 Tablet(s) Oral two times a day  tamsulosin 0.4 milliGRAM(s) Oral at bedtime    PRN MEDICATIONS  acetaminophen     Tablet .. 650 milliGRAM(s) Oral every 6 hours PRN  albuterol    90 MICROgram(s) HFA Inhaler 2 Puff(s) Inhalation every 6 hours PRN  benzonatate 100 milliGRAM(s) Oral three times a day PRN  melatonin 3 milliGRAM(s) Oral at bedtime PRN    VITALS:  T(F): 98.6  HR: 72  BP: 109/57  RR: 21  SpO2: 99%    PHYSICAL EXAM  GEN: no distress, comfortable  PULM: BS heard b/l equal, No wheezing  CVS: S1S2 present, no rubs or gallops  ABD: Soft, non-distended, no guarding; non-tender  EXT: No lower extremity edema  NEURO: Awake and alert moving all extremities    LABS                        8.0    4.53  )-----------( 179      ( 31 May 2024 06:12 )             25.5     05-31    139  |  105  |  13  ----------------------------<  88  4.0   |  26  |  1.2    Ca    8.3<L>      31 May 2024 06:12  Mg     2.1     05-30    TPro  5.7<L>  /  Alb  3.8  /  TBili  0.8  /  DBili  x   /  AST  11  /  ALT  6   /  AlkPhos  47  05-31    PT/INR - ( 30 May 2024 06:07 )   PT: 13.70 sec;   INR: 1.20 ratio           Urinalysis Basic - ( 31 May 2024 06:12 )    Color: x / Appearance: x / SG: x / pH: x  Gluc: 88 mg/dL / Ketone: x  / Bili: x / Urobili: x   Blood: x / Protein: x / Nitrite: x   Leuk Esterase: x / RBC: x / WBC x   Sq Epi: x / Non Sq Epi: x / Bacteria: x                RADIOLOGY    
The patient called asking to speak to Halima, she would not say why except it's a personal matter.  
  CARMINA DUURIEL  79y  Male      Patient is a 79y old  Male who presents with a chief complaint of syncope, melena (28 May 2024 10:19)      INTERVAL HPI/OVERNIGHT EVENTS:  He feels ok, no chest pain, no abdominal pain.   Vital Signs Last 24 Hrs  T(C): 36.9 (28 May 2024 05:04), Max: 37.2 (28 May 2024 02:28)  T(F): 98.5 (28 May 2024 05:04), Max: 98.9 (28 May 2024 02:28)  HR: 76 (28 May 2024 05:04) (58 - 82)  BP: 107/57 (28 May 2024 05:04) (107/57 - 143/78)  BP(mean): --  RR: 18 (28 May 2024 05:04) (16 - 18)  SpO2: 97% (27 May 2024 20:06) (97% - 99%)    Parameters below as of 27 May 2024 20:06  Patient On (Oxygen Delivery Method): room air                Consultant(s) Notes Reviewed:  [x ] YES  [ ] NO          MEDICATIONS  (STANDING):  atorvastatin 20 milliGRAM(s) Oral at bedtime  montelukast 10 milliGRAM(s) Oral daily  pantoprazole Infusion 8 mG/Hr (10 mL/Hr) IV Continuous <Continuous>  sacubitril 24 mG/valsartan 26 mG 1 Tablet(s) Oral two times a day  tamsulosin 0.4 milliGRAM(s) Oral at bedtime    MEDICATIONS  (PRN):  acetaminophen     Tablet .. 650 milliGRAM(s) Oral every 6 hours PRN Temp greater or equal to 38C (100.4F), Mild Pain (1 - 3)  albuterol    90 MICROgram(s) HFA Inhaler 2 Puff(s) Inhalation every 6 hours PRN for shortness of breath and/or wheezing  benzonatate 100 milliGRAM(s) Oral three times a day PRN for cough  melatonin 3 milliGRAM(s) Oral at bedtime PRN Insomnia      LABS                          7.8    4.60  )-----------( 172      ( 28 May 2024 05:15 )             25.3     05-28    138  |  105  |  20  ----------------------------<  97  3.9   |  20  |  1.2    Ca    8.2<L>      28 May 2024 05:15  Phos  3.1     05-28  Mg     2.3     05-28    TPro  6.1  /  Alb  4.1  /  TBili  2.0<H>  /  DBili  x   /  AST  12  /  ALT  8   /  AlkPhos  41  05-28      Urinalysis Basic - ( 28 May 2024 05:15 )    Color: x / Appearance: x / SG: x / pH: x  Gluc: 97 mg/dL / Ketone: x  / Bili: x / Urobili: x   Blood: x / Protein: x / Nitrite: x   Leuk Esterase: x / RBC: x / WBC x   Sq Epi: x / Non Sq Epi: x / Bacteria: x      PT/INR - ( 28 May 2024 05:15 )   PT: 12.30 sec;   INR: 1.08 ratio         PTT - ( 27 May 2024 15:30 )  PTT:29.8 sec  Lactate Trend  05-28 @ 01:01 Lactate:0.9   05-27 @ 15:30 Lactate:2.5         CAPILLARY BLOOD GLUCOSE          Urinalysis with Rflx Culture (collected 05-27-24 @ 18:15)        RADIOLOGY & ADDITIONAL TESTS:    Imaging Personally Reviewed:  [ ] YES  [ ] NO    HEALTH ISSUES - PROBLEM Dx:          PHYSICAL EXAM:  GENERAL: NAD, well-developed.  HEAD:  Atraumatic, Normocephalic.  EYES: EOMI, PERRLA, conjunctiva and sclera clear.  NECK: Supple, No JVD.  CHEST/LUNG: Clear to auscultation bilaterally; No wheeze.  HEART: Irregular rate and rhythm; S1 S2.   ABDOMEN: Soft, Nontender, Nondistended; Bowel sounds present.  EXTREMITIES:  2+ Peripheral Pulses, No clubbing, cyanosis, or edema.  PSYCH: AAOx3.  NEUROLOGY: non-focal.  SKIN: No rashes or lesions.
Gastroenterology Follow Up Note      Location: [facility] [address] ([facility])  Patient Name: YAMILE DU  Age: 79y  Gender: Male      Chief Complaint  Patient is a 79y old Male who presents with a chief complaint of syncope, melena (29 May 2024 09:38)  Primary diagnosis of Gastrointestinal hemorrhage      Reason for Consult  Melena      Progress Note  This morning patient was seen and examined at bedside.    Today is hospital day 2d.  Patient is doing fine. No acute events overnight.   Patient's appetite is adequate, and he is tolerating diet and denies nausea or vomiting.   Patient denies any abdominal pain.  Last melena episode was on 05/27.        Vital Signs in the last 24 hours   Vitals Summary T(C): 36.6 (05-29-24 @ 13:02), Max: 36.8 (05-28-24 @ 16:48)  HR: 82 (05-29-24 @ 13:02) (62 - 82)  BP: 101/59 (05-29-24 @ 13:02) (88/53 - 125/74)  RR: 16 (05-29-24 @ 13:02) (15 - 18)  SpO2: 97% (05-29-24 @ 13:02) (96% - 99%)  Vent Data   Intake/ Output   05-28-24 @ 07:01  -  05-29-24 @ 07:00  --------------------------------------------------------  IN: 472 mL / OUT: 1300 mL / NET: -828 mL    05-29-24 @ 07:01  -  05-29-24 @ 13:28  --------------------------------------------------------  IN: 325 mL / OUT: 250 mL / NET: 75 mL      Measurements   Weight (kg): 95.3 (05-28-24 @ 17:11)      Physical Exam  * General Appearance: Alert, interactive, oriented to time, place, and person, in no acute distress  * Lungs: Good bilateral air entry, normal breath sounds   * Heart: Regular Rate and Rhythm, normal S1 and S2, no audible murmur, rub, or gallop  * Abdomen: Symmetric, non-distended, soft, non-tender, bowel sounds active all four quadrants  * Rectal: Black stool with brown tinge, Normal tone, no palpable masses or tenderness      Investigations   Laboratory Workup      - CBC:                        7.1    4.30  )-----------( 168      ( 29 May 2024 06:37 )             23.2       - Hgb Trend:  7.1  05-29-24 @ 06:37  7.3  05-28-24 @ 22:09  7.8  05-28-24 @ 05:15  6.9  05-27-24 @ 15:30          - Chemistry:  05-29    138  |  106  |  17  ----------------------------<  99  3.7   |  25  |  1.0    Ca    7.9<L>      29 May 2024 06:37  Phos  3.1     05-29  Mg     2.2     05-29    TPro  5.3<L>  /  Alb  3.5  /  TBili  1.0  /  DBili  x   /  AST  10  /  ALT  7   /  AlkPhos  39  05-29    Liver panel trend:  TBili 1.0   /   AST 10   /   ALT 7   /   AlkP 39   /   Tptn 5.3   /   Alb 3.5    /   DBili --      05-29  TBili 2.0   /   AST 12   /   ALT 8   /   AlkP 41   /   Tptn 6.1   /   Alb 4.1    /   DBili --      05-28  TBili 0.6   /   AST 12   /   ALT 9   /   AlkP 39   /   Tptn 6.2   /   Alb 4.2    /   DBili --      05-27      - Coagulation Studies:  PT/INR - ( 29 May 2024 06:37 )   PT: 13.20 sec;   INR: 1.16 ratio         PTT - ( 27 May 2024 15:30 )  PTT:29.8 sec    - ABG:      - Cardiac Markers:        Microbiological Workup  Urinalysis Basic - ( 29 May 2024 06:37 )    Color: x / Appearance: x / SG: x / pH: x  Gluc: 99 mg/dL / Ketone: x  / Bili: x / Urobili: x   Blood: x / Protein: x / Nitrite: x   Leuk Esterase: x / RBC: x / WBC x   Sq Epi: x / Non Sq Epi: x / Bacteria: x        Urinalysis with Rflx Culture (collected 27 May 2024 18:15)        Radiological Workup    US Abdomen Upper Quadrant Right:   ACC: [id number] EXAM:  US ABDOMEN RT UPR QUADRANT   ORDERED BY: EARL COYLE     PROCEDURE DATE:  05/28/2024          INTERPRETATION:  CLINICAL INFORMATION: Hepatic cyst    COMPARISON: CT abdomen and pelvis 5/27/2024    TECHNIQUE: Sonography of the right upper quadrant.    FINDINGS:  Liver: Left hepatic cyst measuring 1.5 cm..  Bile ducts: Normal caliber. Common bile duct measures 3 mm..  Gallbladder: Sludge..  Pancreas: Obscured..  Right kidney: 11.8 cm. No hydronephrosis..  Ascites: None.  IVC: Visualized portions are within normal limits.    IMPRESSION:    Left hepatic cyst measuring 1.5 cm.    Gallbladder sludge    --- End of Report ---            SURJIT RODAS MD; Attending Radiologist  This document has been electronically signed. May 28 2024  5:26PM (05-28-24 @ 10:45)          Current Medications  Standing Medications  atorvastatin 20 milliGRAM(s) Oral at bedtime  montelukast 10 milliGRAM(s) Oral daily  pantoprazole    Tablet 40 milliGRAM(s) Oral two times a day  sacubitril 24 mG/valsartan 26 mG 1 Tablet(s) Oral two times a day  tamsulosin 0.4 milliGRAM(s) Oral at bedtime    PRN Medications  acetaminophen     Tablet .. 650 milliGRAM(s) Oral every 6 hours PRN Temp greater or equal to 38C (100.4F), Mild Pain (1 - 3)  albuterol    90 MICROgram(s) HFA Inhaler 2 Puff(s) Inhalation every 6 hours PRN for shortness of breath and/or wheezing  benzonatate 100 milliGRAM(s) Oral three times a day PRN for cough  melatonin 3 milliGRAM(s) Oral at bedtime PRN Insomnia    Singles Doses Administered  (ADM OVERRIDE) 1 each &lt;see task&gt; GiveOnce  pantoprazole  Injectable 40 milliGRAM(s) IV Push Once      
Gastroenterology Follow Up Note      [facility] [address] [facility]  Patient Name: YAMILE DU  Age: 79y  Gender: Male      Chief Complaint  Patient is a 79y old Male who presents with a chief complaint of syncope, melena (29 May 2024 14:10)  Primary diagnosis of Gastrointestinal hemorrhage      Reason for Consult  Melena      Progress Note  This morning patient was seen and examined at bedside.    Today is hospital day 3d.  Patient is doing fine. No acute events overnight.   Patient's appetite is adequate, and he is tolerating diet and denies nausea or vomiting.   Patient denies any abdominal pain.  Last melena episode was on 05/27.      Vital Signs in the last 24 hours   Vitals Summary T(C): 36.8 (05-30-24 @ 07:00), Max: 36.8 (05-30-24 @ 07:00)  HR: 61 (05-30-24 @ 07:00) (59 - 82)  BP: 107/53 (05-30-24 @ 07:00) (101/59 - 135/80)  RR: 20 (05-30-24 @ 07:00) (16 - 20)  SpO2: 98% (05-30-24 @ 07:00) (97% - 100%)  Vent Data   Intake/ Output   05-29-24 @ 07:01  -  05-30-24 @ 07:00  --------------------------------------------------------  IN: 739 mL / OUT: 250 mL / NET: 489 mL        Physical Exam  * General Appearance: Alert, interactive, oriented to time, place, and person, in no acute distress  * Lungs: Good bilateral air entry, normal breath sounds   * Heart: Regular Rate and Rhythm, normal S1 and S2, no audible murmur, rub, or gallop  * Abdomen: Symmetric, non-distended, soft, non-tender, bowel sounds active all four quadrants  * Rectal: Black stool with brown tinge, Normal tone, no palpable masses or tenderness      Investigations   Laboratory Workup      - CBC:                        8.0    4.32  )-----------( 168      ( 30 May 2024 06:07 )             25.5       - Hgb Trend:  8.0  05-30-24 @ 06:07  7.1  05-29-24 @ 06:37  7.3  05-28-24 @ 22:09  7.8  05-28-24 @ 05:15  6.9  05-27-24 @ 15:30          - Chemistry:  05-30    140  |  107  |  15  ----------------------------<  98  3.6   |  24  |  1.0    Ca    7.8<L>      30 May 2024 06:07  Phos  3.1     05-29  Mg     2.1     05-30    TPro  5.4<L>  /  Alb  3.6  /  TBili  0.8  /  DBili  x   /  AST  10  /  ALT  7   /  AlkPhos  42  05-30    Liver panel trend:  TBili 0.8   /   AST 10   /   ALT 7   /   AlkP 42   /   Tptn 5.4   /   Alb 3.6    /   DBili --      05-30  TBili 1.0   /   AST 10   /   ALT 7   /   AlkP 39   /   Tptn 5.3   /   Alb 3.5    /   DBili --      05-29  TBili 2.0   /   AST 12   /   ALT 8   /   AlkP 41   /   Tptn 6.1   /   Alb 4.1    /   DBili --      05-28  TBili 0.6   /   AST 12   /   ALT 9   /   AlkP 39   /   Tptn 6.2   /   Alb 4.2    /   DBili --      05-27      - Coagulation Studies:  PT/INR - ( 30 May 2024 06:07 )   PT: 13.70 sec;   INR: 1.20 ratio           Microbiological Workup  Urinalysis Basic - ( 30 May 2024 06:07 )    Color: x / Appearance: x / SG: x / pH: x  Gluc: 98 mg/dL / Ketone: x  / Bili: x / Urobili: x   Blood: x / Protein: x / Nitrite: x   Leuk Esterase: x / RBC: x / WBC x   Sq Epi: x / Non Sq Epi: x / Bacteria: x        Urinalysis with Rflx Culture (collected 27 May 2024 18:15)        Radiological Workup    US Abdomen Upper Quadrant Right:   ACC: [id number] EXAM:  US ABDOMEN RT UPR QUADRANT   ORDERED BY: EARL COYLE     PROCEDURE DATE:  05/28/2024          INTERPRETATION:  CLINICAL INFORMATION: Hepatic cyst    COMPARISON: CT abdomen and pelvis 5/27/2024    TECHNIQUE: Sonography of the right upper quadrant.    FINDINGS:  Liver: Left hepatic cyst measuring 1.5 cm..  Bile ducts: Normal caliber. Common bile duct measures 3 mm..  Gallbladder: Sludge..  Pancreas: Obscured..  Right kidney: 11.8 cm. No hydronephrosis..  Ascites: None.  IVC: Visualized portions are within normal limits.    IMPRESSION:    Left hepatic cyst measuring 1.5 cm.    Gallbladder sludge    --- End of Report ---            SURJIT RODAS MD; Attending Radiologist  This document has been electronically signed. May 28 2024  5:26PM (05-28-24 @ 10:45)          Current Medications  Standing Medications  atorvastatin 20 milliGRAM(s) Oral at bedtime  iron sucrose IVPB 200 milliGRAM(s) IV Intermittent every 24 hours  montelukast 10 milliGRAM(s) Oral daily  pantoprazole    Tablet 40 milliGRAM(s) Oral two times a day  sacubitril 24 mG/valsartan 26 mG 1 Tablet(s) Oral two times a day  tamsulosin 0.4 milliGRAM(s) Oral at bedtime    PRN Medications  acetaminophen     Tablet .. 650 milliGRAM(s) Oral every 6 hours PRN Temp greater or equal to 38C (100.4F), Mild Pain (1 - 3)  albuterol    90 MICROgram(s) HFA Inhaler 2 Puff(s) Inhalation every 6 hours PRN for shortness of breath and/or wheezing  benzonatate 100 milliGRAM(s) Oral three times a day PRN for cough  melatonin 3 milliGRAM(s) Oral at bedtime PRN Insomnia    Singles Doses Administered  (ADM OVERRIDE) 1 each &lt;see task&gt; GiveOnce  pantoprazole  Injectable 40 milliGRAM(s) IV Push Once    
24H events:    Patient is a 79y old Male who presents with a chief complaint of syncope, melena (28 May 2024 08:14)    Primary diagnosis of GIB (gastrointestinal bleeding)    Today is hospital day 1d. This morning patient was seen and examined at bedside, resting comfortably in bed.    No acute or major events overnight. Hemodynamically stable,NPO, voiding appropriately with appropriate bowel movements.       Code Status: FULL CODE      PAST MEDICAL & SURGICAL HISTORY  HTN (hypertension)    Aortic dissection    Hypertension    CAD (coronary artery disease)    S/P CABG x 1    Unspecified atrial fibrillation    Chronic systolic congestive heart failure    Asthma    S/P aortic dissection repair      SOCIAL HISTORY:  Social History:      ALLERGIES:  No Known Allergies    MEDICATIONS:  STANDING MEDICATIONS  atorvastatin 20 milliGRAM(s) Oral at bedtime  montelukast 10 milliGRAM(s) Oral daily  pantoprazole Infusion 8 mG/Hr IV Continuous <Continuous>  sacubitril 24 mG/valsartan 26 mG 1 Tablet(s) Oral two times a day  tamsulosin 0.4 milliGRAM(s) Oral at bedtime    PRN MEDICATIONS  acetaminophen     Tablet .. 650 milliGRAM(s) Oral every 6 hours PRN  albuterol    90 MICROgram(s) HFA Inhaler 2 Puff(s) Inhalation every 6 hours PRN  benzonatate 100 milliGRAM(s) Oral three times a day PRN  melatonin 3 milliGRAM(s) Oral at bedtime PRN      ROS:  HEENT: Endorses dizziness   CV: Denies chest pain  Pulm: Endorses cough    VITALS:   T(F): 98.5  HR: 76  BP: 107/57  RR: 18  SpO2: 97%    PHYSICAL EXAM:  GENERAL: NAD, lying in bed comfortably, cooperative, elderly  HEAD: Normocephalic  HEART: Regular rate and rhythm, murmur >L sternal border  LUNGS: No acute respiratory distress, clear b/l breath sounds, no wheezing  ABDOMEN:  soft  EXTREMITIES: no rashes, extremities warm/dry, no cyanosis    LABS:                        7.8    4.60  )-----------( 172      ( 28 May 2024 05:15 )             25.3     05-28    138  |  105  |  20  ----------------------------<  97  3.9   |  20  |  1.2    Ca    8.2<L>      28 May 2024 05:15  Phos  3.1     05-28  Mg     2.3     05-28    TPro  6.1  /  Alb  4.1  /  TBili  2.0<H>  /  DBili  x   /  AST  12  /  ALT  8   /  AlkPhos  41  05-28    PT/INR - ( 28 May 2024 05:15 )   PT: 12.30 sec;   INR: 1.08 ratio         PTT - ( 27 May 2024 15:30 )  PTT:29.8 sec  Urinalysis Basic - ( 28 May 2024 05:15 )    Color: x / Appearance: x / SG: x / pH: x  Gluc: 97 mg/dL / Ketone: x  / Bili: x / Urobili: x   Blood: x / Protein: x / Nitrite: x   Leuk Esterase: x / RBC: x / WBC x   Sq Epi: x / Non Sq Epi: x / Bacteria: x        Lactate, Blood: 0.9 mmol/L (05-28-24 @ 01:01)  Lactate, Blood: 2.5 mmol/L *H* (05-27-24 @ 15:30)      Urinalysis with Rflx Culture (collected 27 May 2024 18:15)          RADIOLOGY:    RADIOLOGY    CT HEAD & CT CERVICAL SPINE 27 May 2024  IMPRESSION:  CT HEAD:  No acute intracranial findings.  CT CERVICAL SPINE:  No acute cervical fracture or facet subluxation.    CT CHEST/ABD/PELV 27 May 2024  IMPRESSION:  Stable short segment aortic dissection originating immediately after the left subclavian artery takeoff from the aortic arch extending only a few centimeters along the aortic arch. Dissection flap does not extend into the descending thoracic aorta or into any branch vessels.  Mild increase in diameter of the descending thoracic aorta with no involvement of dissection. Close interval follow-up recommended.  No evidence of hematoma.    CXR 27 May 2024  IMPRESSION:  No radiographic evidence of acute cardiopulmonary disease.    
Gastroenterology Follow Up Note      [facility] 4B [address] [facility]  Patient Name: YAMILE DU  Age: 79y  Gender: Male      Chief Complaint  Patient is a 79y old Male who presents with a chief complaint of syncope, melena (31 May 2024 12:36)  Primary diagnosis of Gastrointestinal hemorrhage      Reason for Consult  Melena      Progress Note  This morning patient was seen and examined at bedside.    Today is hospital day 4d.  Patient is doing fine. No acute events overnight.   Patient's appetite is adequate, and he is tolerating diet and denies nausea or vomiting.   Patient denies any abdominal pain.  Last melena episode was on 05/27.   No recurrent melena since EGD.      Vital Signs in the last 24 hours   Vitals Summary T(C): 37 (05-31-24 @ 07:00), Max: 37 (05-31-24 @ 07:00)  HR: 72 (05-31-24 @ 07:00) (69 - 72)  BP: 109/57 (05-31-24 @ 07:00) (109/57 - 125/81)  RR: 21 (05-31-24 @ 07:00) (20 - 21)  SpO2: 99% (05-31-24 @ 07:00) (98% - 99%)  Vent Data   Intake/ Output   05-30-24 @ 07:01  -  05-31-24 @ 07:00  --------------------------------------------------------  IN: 0 mL / OUT: 400 mL / NET: -400 mL      Physical Exam  * General Appearance: Alert, interactive, oriented to time, place, and person, in no acute distress  * Lungs: Good bilateral air entry, normal breath sounds   * Heart: Regular Rate and Rhythm, normal S1 and S2, no audible murmur, rub, or gallop  * Abdomen: Symmetric, non-distended, soft, non-tender, bowel sounds active all four quadrants      Investigations   Laboratory Workup      - CBC:                        8.0    4.53  )-----------( 179      ( 31 May 2024 06:12 )             25.5       - Hgb Trend:  8.0  05-31-24 @ 06:12  8.7  05-30-24 @ 17:25  8.0  05-30-24 @ 06:07  7.1  05-29-24 @ 06:37  7.3  05-28-24 @ 22:09          - Chemistry:  05-31    139  |  105  |  13  ----------------------------<  88  4.0   |  26  |  1.2    Ca    8.3<L>      31 May 2024 06:12  Mg     2.1     05-30    TPro  5.7<L>  /  Alb  3.8  /  TBili  0.8  /  DBili  x   /  AST  11  /  ALT  6   /  AlkPhos  47  05-31    Liver panel trend:  TBili 0.8   /   AST 11   /   ALT 6   /   AlkP 47   /   Tptn 5.7   /   Alb 3.8    /   DBili --      05-31  TBili 0.8   /   AST 10   /   ALT 7   /   AlkP 42   /   Tptn 5.4   /   Alb 3.6    /   DBili --      05-30  TBili 1.0   /   AST 10   /   ALT 7   /   AlkP 39   /   Tptn 5.3   /   Alb 3.5    /   DBili --      05-29  TBili 2.0   /   AST 12   /   ALT 8   /   AlkP 41   /   Tptn 6.1   /   Alb 4.1    /   DBili --      05-28  TBili 0.6   /   AST 12   /   ALT 9   /   AlkP 39   /   Tptn 6.2   /   Alb 4.2    /   DBili --      05-27      - Coagulation Studies:  PT/INR - ( 30 May 2024 06:07 )   PT: 13.70 sec;   INR: 1.20 ratio            Microbiological Workup  Urinalysis Basic - ( 31 May 2024 06:12 )    Color: x / Appearance: x / SG: x / pH: x  Gluc: 88 mg/dL / Ketone: x  / Bili: x / Urobili: x   Blood: x / Protein: x / Nitrite: x   Leuk Esterase: x / RBC: x / WBC x   Sq Epi: x / Non Sq Epi: x / Bacteria: x          Current Medications  Standing Medications  atorvastatin 20 milliGRAM(s) Oral at bedtime  enoxaparin Injectable 90 milliGRAM(s) SubCutaneous every 12 hours  iron sucrose IVPB 200 milliGRAM(s) IV Intermittent every 24 hours  montelukast 10 milliGRAM(s) Oral daily  pantoprazole    Tablet 40 milliGRAM(s) Oral two times a day  sacubitril 24 mG/valsartan 26 mG 1 Tablet(s) Oral two times a day  tamsulosin 0.4 milliGRAM(s) Oral at bedtime    PRN Medications  acetaminophen     Tablet .. 650 milliGRAM(s) Oral every 6 hours PRN Temp greater or equal to 38C (100.4F), Mild Pain (1 - 3)  albuterol    90 MICROgram(s) HFA Inhaler 2 Puff(s) Inhalation every 6 hours PRN for shortness of breath and/or wheezing  benzonatate 100 milliGRAM(s) Oral three times a day PRN for cough  melatonin 3 milliGRAM(s) Oral at bedtime PRN Insomnia    Singles Doses Administered  (ADM OVERRIDE) 1 each &lt;see task&gt; GiveOnce  pantoprazole  Injectable 40 milliGRAM(s) IV Push Once

## 2024-06-06 DIAGNOSIS — I25.10 ATHEROSCLEROTIC HEART DISEASE OF NATIVE CORONARY ARTERY WITHOUT ANGINA PECTORIS: ICD-10-CM

## 2024-06-06 DIAGNOSIS — Z79.01 LONG TERM (CURRENT) USE OF ANTICOAGULANTS: ICD-10-CM

## 2024-06-06 DIAGNOSIS — Z95.1 PRESENCE OF AORTOCORONARY BYPASS GRAFT: ICD-10-CM

## 2024-06-06 DIAGNOSIS — D62 ACUTE POSTHEMORRHAGIC ANEMIA: ICD-10-CM

## 2024-06-06 DIAGNOSIS — I50.22 CHRONIC SYSTOLIC (CONGESTIVE) HEART FAILURE: ICD-10-CM

## 2024-06-06 DIAGNOSIS — I95.1 ORTHOSTATIC HYPOTENSION: ICD-10-CM

## 2024-06-06 DIAGNOSIS — F10.91 ALCOHOL USE, UNSPECIFIED, IN REMISSION: ICD-10-CM

## 2024-06-06 DIAGNOSIS — I71.00 DISSECTION OF UNSPECIFIED SITE OF AORTA: ICD-10-CM

## 2024-06-06 DIAGNOSIS — K25.4 CHRONIC OR UNSPECIFIED GASTRIC ULCER WITH HEMORRHAGE: ICD-10-CM

## 2024-06-06 DIAGNOSIS — Z85.038 PERSONAL HISTORY OF OTHER MALIGNANT NEOPLASM OF LARGE INTESTINE: ICD-10-CM

## 2024-06-06 DIAGNOSIS — K57.10 DIVERTICULOSIS OF SMALL INTESTINE WITHOUT PERFORATION OR ABSCESS WITHOUT BLEEDING: ICD-10-CM

## 2024-06-06 DIAGNOSIS — I11.0 HYPERTENSIVE HEART DISEASE WITH HEART FAILURE: ICD-10-CM

## 2024-06-06 DIAGNOSIS — I48.20 CHRONIC ATRIAL FIBRILLATION, UNSPECIFIED: ICD-10-CM

## 2024-06-06 DIAGNOSIS — Z85.028 PERSONAL HISTORY OF OTHER MALIGNANT NEOPLASM OF STOMACH: ICD-10-CM

## 2024-06-06 DIAGNOSIS — I71.22 ANEURYSM OF THE AORTIC ARCH, WITHOUT RUPTURE: ICD-10-CM

## 2024-06-06 DIAGNOSIS — K76.89 OTHER SPECIFIED DISEASES OF LIVER: ICD-10-CM

## 2024-06-17 PROBLEM — Z00.00 ENCOUNTER FOR PREVENTIVE HEALTH EXAMINATION: Status: ACTIVE | Noted: 2024-06-17

## 2024-06-18 NOTE — PATIENT PROFILE ADULT - IS THERE A SUSPICION OF ABUSE/NEGLIGENCE?
Consult    Patient: Robert Gastelum MRN: [id number]  SSN: [id number]    YOB: 1944  Age: 80 y.o.  Sex: male      Subjective:      Robert Gastelum is a 80 y.o. male who is being seen for evaluation of left lower extremity DVT.    He has a history of LLE leg swelling which was attributed to compression from large groin lymph node    He has been on Hep gtt for about 12 hours at time of my visit and already is dramatically symptomatically improved.    Past Medical History:   Diagnosis Date    Arrhythmia     LBBB    Arthritis     Asthma     MILD    Cancer (HCC) 2015    BLADDER    Cancer (HCC)     scc top of head and nose    COVID-19 vaccine series completed     MODERNA VACCINE 1-28-21 AND 2-25-21    Depression with anxiety     Hypertension     Other unknown and unspecified cause of morbidity or mortality     history of pulmonary sarcoid     Posterior cervical adenopathy 11/19/2018    Pulmonary sarcoidosis (HCC)     Unspecified sleep apnea     cpap     Past Surgical History:   Procedure Laterality Date    CATARACT REMOVAL Bilateral     HERNIA REPAIR Left 1990    INGUIBAL    HERNIA REPAIR Right AS A CHILD    INGUINAL    IR PORT PLACEMENT EQUAL OR GREATER THAN 5 YEARS  7/16/2021    IR PORT PLACEMENT EQUAL OR GREATER THAN 5 YEARS 7/16/2021 [facility] RAD ANGIO IR    IR PORT PLACEMENT EQUAL OR GREATER THAN 5 YEARS  7/16/2021    IR URETERAL PLACEMENT STENT&NEPHRO CATH NEW ACCESS  8/8/2023    IR URETERAL PLACEMENT STENT&NEPHRO CATH NEW ACCESS 8/8/2023 [facility] RAD ANGIO IR    KIDNEY REMOVAL Left 11/13/2023    LAPAROSCOPIC, HAND ASSISTED LEFT NEPHRECTOMY.  PARASTOMAL HERNIA REPAIR (E R A S) REMOVAL OF NEPHROSTOMY TUBE performed by Pranav Naqvi MD at [facility] MAIN OR    ORTHOPEDIC SURGERY Right     BONE SPURS REMOVED FROM FOOT    OTHER SURGICAL HISTORY Right 2020    SKIN CANCER RELMOVED FROM LEG    OTHER SURGICAL HISTORY  2005    benign lump removed from thyroid    OTHER SURGICAL HISTORY      scc removed top of head and  no

## 2024-07-03 ENCOUNTER — APPOINTMENT (OUTPATIENT)
Dept: VASCULAR SURGERY | Facility: CLINIC | Age: 79
End: 2024-07-03
Payer: MEDICARE

## 2024-07-03 VITALS — HEART RATE: 59 BPM | DIASTOLIC BLOOD PRESSURE: 78 MMHG | SYSTOLIC BLOOD PRESSURE: 132 MMHG

## 2024-07-03 VITALS — HEIGHT: 66 IN | BODY MASS INDEX: 33.75 KG/M2 | WEIGHT: 210 LBS

## 2024-07-03 DIAGNOSIS — I71.010 DISSECTION OF ASCENDING AORTA: ICD-10-CM

## 2024-07-03 DIAGNOSIS — Z78.9 OTHER SPECIFIED HEALTH STATUS: ICD-10-CM

## 2024-07-03 DIAGNOSIS — I48.91 UNSPECIFIED ATRIAL FIBRILLATION: ICD-10-CM

## 2024-07-03 DIAGNOSIS — I25.118 ATHEROSCLEROTIC HEART DISEASE OF NATIVE CORONARY ARTERY WITH OTHER FORMS OF ANGINA PECTORIS: ICD-10-CM

## 2024-07-03 PROCEDURE — 99212 OFFICE O/P EST SF 10 MIN: CPT

## 2024-07-03 RX ORDER — PANTOPRAZOLE 40 MG/1
TABLET, DELAYED RELEASE ORAL
Refills: 0 | Status: ACTIVE | COMMUNITY

## 2024-07-03 RX ORDER — MONTELUKAST 10 MG/1
TABLET, FILM COATED ORAL
Refills: 0 | Status: ACTIVE | COMMUNITY

## 2024-07-03 RX ORDER — MECLIZINE HYDROCHLORIDE 25 MG/1
TABLET ORAL
Refills: 0 | Status: ACTIVE | COMMUNITY

## 2024-07-03 RX ORDER — APIXABAN 5 MG/1
TABLET, FILM COATED ORAL
Refills: 0 | Status: ACTIVE | COMMUNITY

## 2024-07-03 RX ORDER — MONTELUKAST 10 MG/1
10 TABLET, FILM COATED ORAL
Refills: 0 | Status: ACTIVE | COMMUNITY

## 2024-07-03 RX ORDER — ATORVASTATIN CALCIUM 20 MG/1
20 TABLET, FILM COATED ORAL
Refills: 0 | Status: ACTIVE | COMMUNITY

## 2024-07-03 RX ORDER — BENZONATATE 100 MG/1
100 CAPSULE ORAL
Refills: 0 | Status: ACTIVE | COMMUNITY

## 2024-07-03 RX ORDER — APIXABAN 5 MG/1
5 TABLET, FILM COATED ORAL
Refills: 0 | Status: ACTIVE | COMMUNITY

## 2024-07-03 RX ORDER — SACUBITRIL AND VALSARTAN 49; 51 MG/1; MG/1
TABLET, FILM COATED ORAL
Refills: 0 | Status: ACTIVE | COMMUNITY

## 2024-07-03 RX ORDER — CHLORHEXIDINE GLUCONATE 4 %
325 (65 FE) LIQUID (ML) TOPICAL
Refills: 0 | Status: ACTIVE | COMMUNITY

## 2024-07-03 RX ORDER — MECLIZINE HYDROCHLORIDE 25 MG/1
25 TABLET ORAL
Refills: 0 | Status: ACTIVE | COMMUNITY

## 2024-07-03 RX ORDER — PANTOPRAZOLE SODIUM 40 MG/1
40 TABLET, DELAYED RELEASE ORAL
Refills: 0 | Status: ACTIVE | COMMUNITY

## 2024-07-03 RX ORDER — BENZONATATE 200 MG
CAPSULE ORAL
Refills: 0 | Status: ACTIVE | COMMUNITY

## 2024-07-03 RX ORDER — ALBUTEROL 90 MCG
AEROSOL (GRAM) INHALATION
Refills: 0 | Status: ACTIVE | COMMUNITY

## 2024-08-09 NOTE — ED PROVIDER NOTE - CLINICAL SUMMARY MEDICAL DECISION MAKING FREE TEXT BOX
Alert and oriented to person, place and time. Normal mood and affect, no apparent risk to self or others Alert 78-year-old male that presents as a trauma notification status post syncope.  Due to recent hematemesis concern for upper GI bleed.  GI trauma surgery vascular surgery CT surgery aware of patient.  CT surgery and vascular surgery reviewed imaging and determined the patient only has a type B dissection not actively bleeding.  GI is aware of patient's and patient to have a scope tomorrow.  Due to concern for GI bleed will give Kcentra.  Patient given 2 units of blood as patient did become hypotensive during evaluation.  Patient to be admitted to the MICU.  Of note, pt refusing NG tube.  Labs and EKG were ordered and reviewed.  Imaging was ordered and reviewed by me.  Appropriate medications for patient's presenting complaints were ordered and effects were reassessed.  Patient's records (prior hospital, ED visit, and/or nursing home notes if available) were reviewed.  Additional history was obtained from EMS, family, and/or PCP (where available).  Escalation to admission/observation was considered.   Patient requires inpatient hospitalization - ICU

## 2024-11-11 ENCOUNTER — APPOINTMENT (OUTPATIENT)
Dept: GASTROENTEROLOGY | Facility: CLINIC | Age: 79
End: 2024-11-11

## 2025-05-19 ENCOUNTER — INPATIENT (INPATIENT)
Facility: HOSPITAL | Age: 80
LOS: 1 days | Discharge: HOME | DRG: 313 | End: 2025-05-21
Attending: INTERNAL MEDICINE | Admitting: STUDENT IN AN ORGANIZED HEALTH CARE EDUCATION/TRAINING PROGRAM
Payer: MEDICARE

## 2025-05-19 VITALS
TEMPERATURE: 98 F | HEART RATE: 71 BPM | SYSTOLIC BLOOD PRESSURE: 137 MMHG | WEIGHT: 210.1 LBS | HEIGHT: 69 IN | OXYGEN SATURATION: 97 % | RESPIRATION RATE: 18 BRPM | DIASTOLIC BLOOD PRESSURE: 68 MMHG

## 2025-05-19 DIAGNOSIS — R07.9 CHEST PAIN, UNSPECIFIED: ICD-10-CM

## 2025-05-19 DIAGNOSIS — Z98.890 OTHER SPECIFIED POSTPROCEDURAL STATES: Chronic | ICD-10-CM

## 2025-05-19 LAB
ALBUMIN SERPL ELPH-MCNC: 4.4 G/DL — SIGNIFICANT CHANGE UP (ref 3.5–5.2)
ALP SERPL-CCNC: 58 U/L — SIGNIFICANT CHANGE UP (ref 30–115)
ALT FLD-CCNC: 11 U/L — SIGNIFICANT CHANGE UP (ref 0–41)
ANION GAP SERPL CALC-SCNC: 14 MMOL/L — SIGNIFICANT CHANGE UP (ref 7–14)
AST SERPL-CCNC: 17 U/L — SIGNIFICANT CHANGE UP (ref 0–41)
BASOPHILS # BLD AUTO: 0.03 K/UL — SIGNIFICANT CHANGE UP (ref 0–0.2)
BASOPHILS NFR BLD AUTO: 0.5 % — SIGNIFICANT CHANGE UP (ref 0–1)
BILIRUB SERPL-MCNC: 0.6 MG/DL — SIGNIFICANT CHANGE UP (ref 0.2–1.2)
BUN SERPL-MCNC: 14 MG/DL — SIGNIFICANT CHANGE UP (ref 10–20)
CALCIUM SERPL-MCNC: 8.4 MG/DL — SIGNIFICANT CHANGE UP (ref 8.4–10.5)
CHLORIDE SERPL-SCNC: 101 MMOL/L — SIGNIFICANT CHANGE UP (ref 98–110)
CO2 SERPL-SCNC: 22 MMOL/L — SIGNIFICANT CHANGE UP (ref 17–32)
CREAT SERPL-MCNC: 1.1 MG/DL — SIGNIFICANT CHANGE UP (ref 0.7–1.5)
EGFR: 69 ML/MIN/1.73M2 — SIGNIFICANT CHANGE UP
EGFR: 69 ML/MIN/1.73M2 — SIGNIFICANT CHANGE UP
EOSINOPHIL # BLD AUTO: 0.06 K/UL — SIGNIFICANT CHANGE UP (ref 0–0.7)
EOSINOPHIL NFR BLD AUTO: 1.1 % — SIGNIFICANT CHANGE UP (ref 0–8)
GLUCOSE SERPL-MCNC: 111 MG/DL — HIGH (ref 70–99)
HCT VFR BLD CALC: 26.2 % — LOW (ref 42–52)
HGB BLD-MCNC: 7.9 G/DL — LOW (ref 14–18)
IMM GRANULOCYTES NFR BLD AUTO: 0.4 % — HIGH (ref 0.1–0.3)
LYMPHOCYTES # BLD AUTO: 0.81 K/UL — LOW (ref 1.2–3.4)
LYMPHOCYTES # BLD AUTO: 14.2 % — LOW (ref 20.5–51.1)
MCHC RBC-ENTMCNC: 22.4 PG — LOW (ref 27–31)
MCHC RBC-ENTMCNC: 30.2 G/DL — LOW (ref 32–37)
MCV RBC AUTO: 74.2 FL — LOW (ref 80–94)
MONOCYTES # BLD AUTO: 0.5 K/UL — SIGNIFICANT CHANGE UP (ref 0.1–0.6)
MONOCYTES NFR BLD AUTO: 8.8 % — SIGNIFICANT CHANGE UP (ref 1.7–9.3)
NEUTROPHILS # BLD AUTO: 4.28 K/UL — SIGNIFICANT CHANGE UP (ref 1.4–6.5)
NEUTROPHILS NFR BLD AUTO: 75 % — SIGNIFICANT CHANGE UP (ref 42.2–75.2)
NRBC BLD AUTO-RTO: 0 /100 WBCS — SIGNIFICANT CHANGE UP (ref 0–0)
PLATELET # BLD AUTO: 188 K/UL — SIGNIFICANT CHANGE UP (ref 130–400)
PMV BLD: 10 FL — SIGNIFICANT CHANGE UP (ref 7.4–10.4)
POTASSIUM SERPL-MCNC: 3.7 MMOL/L — SIGNIFICANT CHANGE UP (ref 3.5–5)
POTASSIUM SERPL-SCNC: 3.7 MMOL/L — SIGNIFICANT CHANGE UP (ref 3.5–5)
PROT SERPL-MCNC: 6.7 G/DL — SIGNIFICANT CHANGE UP (ref 6–8)
RBC # BLD: 3.53 M/UL — LOW (ref 4.7–6.1)
RBC # FLD: 18.5 % — HIGH (ref 11.5–14.5)
SODIUM SERPL-SCNC: 137 MMOL/L — SIGNIFICANT CHANGE UP (ref 135–146)
TROPONIN T, HIGH SENSITIVITY RESULT: 16 NG/L — SIGNIFICANT CHANGE UP (ref 6–21)
WBC # BLD: 5.7 K/UL — SIGNIFICANT CHANGE UP (ref 4.8–10.8)
WBC # FLD AUTO: 5.7 K/UL — SIGNIFICANT CHANGE UP (ref 4.8–10.8)

## 2025-05-19 PROCEDURE — 86922 COMPATIBILITY TEST ANTIGLOB: CPT

## 2025-05-19 PROCEDURE — 83550 IRON BINDING TEST: CPT

## 2025-05-19 PROCEDURE — 71046 X-RAY EXAM CHEST 2 VIEWS: CPT | Mod: 26

## 2025-05-19 PROCEDURE — 83036 HEMOGLOBIN GLYCOSYLATED A1C: CPT

## 2025-05-19 PROCEDURE — 82728 ASSAY OF FERRITIN: CPT

## 2025-05-19 PROCEDURE — 36415 COLL VENOUS BLD VENIPUNCTURE: CPT

## 2025-05-19 PROCEDURE — 93307 TTE W/O DOPPLER COMPLETE: CPT

## 2025-05-19 PROCEDURE — 74174 CTA ABD&PLVS W/CONTRAST: CPT

## 2025-05-19 PROCEDURE — 85025 COMPLETE CBC W/AUTO DIFF WBC: CPT

## 2025-05-19 PROCEDURE — 80061 LIPID PANEL: CPT

## 2025-05-19 PROCEDURE — 88305 TISSUE EXAM BY PATHOLOGIST: CPT

## 2025-05-19 PROCEDURE — 36430 TRANSFUSION BLD/BLD COMPNT: CPT

## 2025-05-19 PROCEDURE — 80053 COMPREHEN METABOLIC PANEL: CPT

## 2025-05-19 PROCEDURE — 85045 AUTOMATED RETICULOCYTE COUNT: CPT

## 2025-05-19 PROCEDURE — 86900 BLOOD TYPING SEROLOGIC ABO: CPT

## 2025-05-19 PROCEDURE — 86850 RBC ANTIBODY SCREEN: CPT

## 2025-05-19 PROCEDURE — 85027 COMPLETE CBC AUTOMATED: CPT

## 2025-05-19 PROCEDURE — 99291 CRITICAL CARE FIRST HOUR: CPT

## 2025-05-19 PROCEDURE — 93010 ELECTROCARDIOGRAM REPORT: CPT

## 2025-05-19 PROCEDURE — 71275 CT ANGIOGRAPHY CHEST: CPT

## 2025-05-19 PROCEDURE — 85610 PROTHROMBIN TIME: CPT

## 2025-05-19 PROCEDURE — 83540 ASSAY OF IRON: CPT

## 2025-05-19 PROCEDURE — 86901 BLOOD TYPING SEROLOGIC RH(D): CPT

## 2025-05-19 PROCEDURE — 85730 THROMBOPLASTIN TIME PARTIAL: CPT

## 2025-05-19 PROCEDURE — 88312 SPECIAL STAINS GROUP 1: CPT

## 2025-05-19 PROCEDURE — P9016: CPT

## 2025-05-19 PROCEDURE — 83735 ASSAY OF MAGNESIUM: CPT

## 2025-05-19 NOTE — ED PROVIDER NOTE - ATTENDING CONTRIBUTION TO CARE
81 y/o male (Hakka speaking), PMHx of HTN, CAD, s/p CABG 2015, AF on Eliquis, HFimpEF, severe LAE/EARL, mi-mod aortic regurg, h/o aortic dissection s/p repair, h/o GIB, comes in c/o chest pain and SOB for 2 days. No n/v/c/d. No abdominal pain. No back pain. No fever/chills, cough. Pt is a poor historian. On exam, pt in NAD, AAOx3, head NC/AT, CN II-XII intact, PEERL, EOMi, neck (-) midline tenderness, lungs CTA B/L, CV S1S2 regular, abdomen soft/NT/ND/(+)BS, ext (-) edema, motor 5/5x4, sensation intact. Will do labs, XR/EKG and reevaluate.

## 2025-05-19 NOTE — ED PROVIDER NOTE - CLINICAL SUMMARY MEDICAL DECISION MAKING FREE TEXT BOX
81 y/o male (Hakka speaking), PMHx of HTN, CAD, s/p CABG 2015, AF on Eliquis, HFimpEF, severe LAE/EARL, mi-mod aortic regurg, h/o aortic dissection s/p repair, h/o GIB, comes in c/o chest pain and SOB for 2 days. No n/v/c/d. No abdominal pain. No back pain. No fever/chills, cough. Pt is a poor historian. On exam, pt in NAD, AAOx3, head NC/AT, CN II-XII intact, PEERL, EOMi, neck (-) midline tenderness, lungs CTA B/L, CV S1S2 regular, abdomen soft/NT/ND/(+)BS, ext (-) edema, motor 5/5x4, sensation intact. Work up reviewed. Will admit for CP/SOB work up.

## 2025-05-19 NOTE — ED ADULT TRIAGE NOTE - WEIGHT IN LBS
Left message for patient to call back.    Third point of contact, encounter closed.   
Referral Type: INTERNAL  Location: [place]  Procedure: Colonoscopy  Diagnosis: screening, hx of polyps  Referring Provider: latoya  Referral To: MADDIE MALDONADO MD  Referral Notes: hx of polyps   Previous Procedure: YES   · Date: 2/19/2015  · Provider: MADDIE MALDONADO MD  · Sedation Used: MAC  · Findings: sessile cecal polyp  · Notes: repeat in 2 yrs  · LMTCB  · Letter sent      
210.1

## 2025-05-19 NOTE — ED ADULT TRIAGE NOTE - PAIN RATING/NUMBER SCALE (0-10): ACTIVITY
Physical Therapy Daily Progress Note      Visit # : 15  Giselle Bundy reports: hip is tired but I feel a lot better today.    Subjective     Objective       Palpation   Left   Tenderness of the piriformis.     Strength/Myotome Testing     Lumbar   Left   Heel walk: normal  Toe walk: normal    Right   Heel walk: normal    Left Hip   Planes of Motion   Flexion: 5  Extension: 4  Abduction: 4+  Adduction: 4-  External rotation: 4+  Internal rotation: 4    Left Knee   Flexion: 5  Extension: 5    Left Ankle/Foot   Left ankle dorsiflexion strength: 5-/5.  Eversion: 5  Great toe extension: 4+    Tests     Lumbar     Left   Negative passive SLR.      See Exercise, Manual, and Modality Logs for complete treatment.   Able to balance on left foot 10 seconds today.  Level hips with quadraped exercises.    Assessment/Plan  Improving hip strength, able to keep hips level with quadraped exercises.  Still needs cueing in standing as hip abductors and extensors are weak.  Able to return to all exercises today with only mild c/o hip soreness. No tenderness at lumbar and scar is completely healed.  Progress per Plan of Care   MD after next visit.           Manual Therapy:    5     mins  83460;  Therapeutic Exercise:    35     mins  97646;     Neuromuscular Lor:         mins  00809;    Therapeutic Activity:     10     mins  03014;     Gait Training:            mins  09175;     Ultrasound:           mins  54597;    Electrical Stimulation:    15     mins  11535 ( );  Dry Needling           mins self-pay    Timed Treatment:   50   mins   Total Treatment:     75   mins    Arlene Silverman, PT  Physical Therapist                     3 (mild pain)

## 2025-05-19 NOTE — ED ADULT NURSE NOTE - CHIEF COMPLAINT QUOTE
Pt states he has been unwell for a week now,  pt states "I have chest tightness, shortness of breath and dizziness  x 1 week,  fs 122 in triage   Pt states " I also have blood in my stool since Thursday" pt is states hes been constipated and is on colace

## 2025-05-19 NOTE — ED PROVIDER NOTE - OBJECTIVE STATEMENT
79-year-old male past medical history hypertension, CAD, A-fib on Eliquis, CHF, GI bleed presents For ongoing chest pain for the past 2 days that began at rest associated with dizziness. Patient denied having any difficulty breathing, pleuritic chest pain, palpitations, diaphoresis, fever, nausea, vomiting, diarrhea, constipation, abdominal pain, urinary symptoms, headache, weakness, URI symptoms, trauma, sick contacts, recent travel, rash.

## 2025-05-19 NOTE — ED PROVIDER NOTE - PHYSICAL EXAMINATION
CONSTITUTIONAL: NAD  SKIN: Warm dry  HEAD: NCAT  EYES: NL inspection  ENT: MMM  NECK: Supple; non tender.  CARD: RRR  RESP: No respiratory distress, lung sounds clear bilaterally  ABD: S/NT no R/G  EXT: no pedal edema, no calf tenderness  NEURO: Grossly unremarkable  PSYCH: Cooperative, appropriate.

## 2025-05-19 NOTE — ED ADULT TRIAGE NOTE - DATE/TIME OF ACCEPTANCE
Refill Toprol XL 25mg po daily 90 day supply with 3 refills. Refill Losartan 50mg po daily 90 day supply with 3 refills. See Dr. Franky Lane in 6 months. 19-May-2025 19:49

## 2025-05-19 NOTE — ED ADULT TRIAGE NOTE - CHIEF COMPLAINT QUOTE
Pt states he has been unwell for a week now,  pt states "I have chest tightness, shortness of breath and dizziness  x 1 week,    Pt states " I also have blood in my stool since Thursday" Pt states he has been unwell for a week now,  pt states "I have chest tightness, shortness of breath and dizziness  x 1 week,  fs 122 in triage   Pt states " I also have blood in my stool since Thursday" pt is states hes been constipated and is on colace

## 2025-05-20 LAB
A1C WITH ESTIMATED AVERAGE GLUCOSE RESULT: 5.9 % — HIGH (ref 4–5.6)
ALBUMIN SERPL ELPH-MCNC: 3.7 G/DL — SIGNIFICANT CHANGE UP (ref 3.5–5.2)
ALBUMIN SERPL ELPH-MCNC: 4 G/DL — SIGNIFICANT CHANGE UP (ref 3.5–5.2)
ALP SERPL-CCNC: 50 U/L — SIGNIFICANT CHANGE UP (ref 30–115)
ALP SERPL-CCNC: 57 U/L — SIGNIFICANT CHANGE UP (ref 30–115)
ALT FLD-CCNC: 10 U/L — SIGNIFICANT CHANGE UP (ref 0–41)
ALT FLD-CCNC: 12 U/L — SIGNIFICANT CHANGE UP (ref 0–41)
ANION GAP SERPL CALC-SCNC: 11 MMOL/L — SIGNIFICANT CHANGE UP (ref 7–14)
ANION GAP SERPL CALC-SCNC: 13 MMOL/L — SIGNIFICANT CHANGE UP (ref 7–14)
APTT BLD: 37.1 SEC — SIGNIFICANT CHANGE UP (ref 27–39.2)
AST SERPL-CCNC: 14 U/L — SIGNIFICANT CHANGE UP (ref 0–41)
AST SERPL-CCNC: 19 U/L — SIGNIFICANT CHANGE UP (ref 0–41)
BASOPHILS # BLD AUTO: 0.04 K/UL — SIGNIFICANT CHANGE UP (ref 0–0.2)
BASOPHILS NFR BLD AUTO: 0.9 % — SIGNIFICANT CHANGE UP (ref 0–1)
BILIRUB SERPL-MCNC: 0.8 MG/DL — SIGNIFICANT CHANGE UP (ref 0.2–1.2)
BILIRUB SERPL-MCNC: 0.9 MG/DL — SIGNIFICANT CHANGE UP (ref 0.2–1.2)
BUN SERPL-MCNC: 11 MG/DL — SIGNIFICANT CHANGE UP (ref 10–20)
BUN SERPL-MCNC: 13 MG/DL — SIGNIFICANT CHANGE UP (ref 10–20)
CALCIUM SERPL-MCNC: 8.1 MG/DL — LOW (ref 8.4–10.5)
CALCIUM SERPL-MCNC: 8.6 MG/DL — SIGNIFICANT CHANGE UP (ref 8.4–10.5)
CHLORIDE SERPL-SCNC: 106 MMOL/L — SIGNIFICANT CHANGE UP (ref 98–110)
CHLORIDE SERPL-SCNC: 106 MMOL/L — SIGNIFICANT CHANGE UP (ref 98–110)
CHOLEST SERPL-MCNC: 146 MG/DL — SIGNIFICANT CHANGE UP
CO2 SERPL-SCNC: 21 MMOL/L — SIGNIFICANT CHANGE UP (ref 17–32)
CO2 SERPL-SCNC: 23 MMOL/L — SIGNIFICANT CHANGE UP (ref 17–32)
CREAT SERPL-MCNC: 1.1 MG/DL — SIGNIFICANT CHANGE UP (ref 0.7–1.5)
CREAT SERPL-MCNC: 1.1 MG/DL — SIGNIFICANT CHANGE UP (ref 0.7–1.5)
EGFR: 69 ML/MIN/1.73M2 — SIGNIFICANT CHANGE UP
EOSINOPHIL # BLD AUTO: 0.1 K/UL — SIGNIFICANT CHANGE UP (ref 0–0.7)
EOSINOPHIL NFR BLD AUTO: 2.3 % — SIGNIFICANT CHANGE UP (ref 0–8)
ESTIMATED AVERAGE GLUCOSE: 123 MG/DL — HIGH (ref 68–114)
FERRITIN SERPL-MCNC: 12 NG/ML — LOW (ref 30–400)
GLUCOSE SERPL-MCNC: 83 MG/DL — SIGNIFICANT CHANGE UP (ref 70–99)
GLUCOSE SERPL-MCNC: 96 MG/DL — SIGNIFICANT CHANGE UP (ref 70–99)
HCT VFR BLD CALC: 26.5 % — LOW (ref 42–52)
HCT VFR BLD CALC: 28.8 % — LOW (ref 42–52)
HDLC SERPL-MCNC: 45 MG/DL — SIGNIFICANT CHANGE UP
HGB BLD-MCNC: 8.3 G/DL — LOW (ref 14–18)
HGB BLD-MCNC: 8.9 G/DL — LOW (ref 14–18)
IMM GRANULOCYTES NFR BLD AUTO: 0.2 % — SIGNIFICANT CHANGE UP (ref 0.1–0.3)
INR BLD: 1.11 RATIO — SIGNIFICANT CHANGE UP (ref 0.65–1.3)
IRON SATN MFR SERPL: 17 UG/DL — LOW (ref 35–150)
IRON SATN MFR SERPL: 4 % — LOW (ref 15–50)
LDLC SERPL-MCNC: 85 MG/DL — SIGNIFICANT CHANGE UP
LIPID PNL WITH DIRECT LDL SERPL: 85 MG/DL — SIGNIFICANT CHANGE UP
LYMPHOCYTES # BLD AUTO: 0.65 K/UL — LOW (ref 1.2–3.4)
LYMPHOCYTES # BLD AUTO: 15 % — LOW (ref 20.5–51.1)
MAGNESIUM SERPL-MCNC: 2.1 MG/DL — SIGNIFICANT CHANGE UP (ref 1.8–2.4)
MAGNESIUM SERPL-MCNC: 2.2 MG/DL — SIGNIFICANT CHANGE UP (ref 1.8–2.4)
MCHC RBC-ENTMCNC: 23.3 PG — LOW (ref 27–31)
MCHC RBC-ENTMCNC: 23.7 PG — LOW (ref 27–31)
MCHC RBC-ENTMCNC: 30.9 G/DL — LOW (ref 32–37)
MCHC RBC-ENTMCNC: 31.3 G/DL — LOW (ref 32–37)
MCV RBC AUTO: 75.4 FL — LOW (ref 80–94)
MCV RBC AUTO: 75.7 FL — LOW (ref 80–94)
MONOCYTES # BLD AUTO: 0.47 K/UL — SIGNIFICANT CHANGE UP (ref 0.1–0.6)
MONOCYTES NFR BLD AUTO: 10.9 % — HIGH (ref 1.7–9.3)
NEUTROPHILS # BLD AUTO: 3.06 K/UL — SIGNIFICANT CHANGE UP (ref 1.4–6.5)
NEUTROPHILS NFR BLD AUTO: 70.7 % — SIGNIFICANT CHANGE UP (ref 42.2–75.2)
NONHDLC SERPL-MCNC: 101 MG/DL — SIGNIFICANT CHANGE UP
NRBC BLD AUTO-RTO: 0 /100 WBCS — SIGNIFICANT CHANGE UP (ref 0–0)
NRBC BLD AUTO-RTO: 0 /100 WBCS — SIGNIFICANT CHANGE UP (ref 0–0)
PLATELET # BLD AUTO: 165 K/UL — SIGNIFICANT CHANGE UP (ref 130–400)
PLATELET # BLD AUTO: 186 K/UL — SIGNIFICANT CHANGE UP (ref 130–400)
PMV BLD: 10.2 FL — SIGNIFICANT CHANGE UP (ref 7.4–10.4)
PMV BLD: 9.7 FL — SIGNIFICANT CHANGE UP (ref 7.4–10.4)
POTASSIUM SERPL-MCNC: 3.8 MMOL/L — SIGNIFICANT CHANGE UP (ref 3.5–5)
POTASSIUM SERPL-MCNC: 4.4 MMOL/L — SIGNIFICANT CHANGE UP (ref 3.5–5)
POTASSIUM SERPL-SCNC: 3.8 MMOL/L — SIGNIFICANT CHANGE UP (ref 3.5–5)
POTASSIUM SERPL-SCNC: 4.4 MMOL/L — SIGNIFICANT CHANGE UP (ref 3.5–5)
PROT SERPL-MCNC: 6 G/DL — SIGNIFICANT CHANGE UP (ref 6–8)
PROT SERPL-MCNC: 6.7 G/DL — SIGNIFICANT CHANGE UP (ref 6–8)
PROTHROM AB SERPL-ACNC: 13.1 SEC — HIGH (ref 9.95–12.87)
RBC # BLD: 3.5 M/UL — LOW (ref 4.7–6.1)
RBC # BLD: 3.82 M/UL — LOW (ref 4.7–6.1)
RBC # FLD: 18.9 % — HIGH (ref 11.5–14.5)
RBC # FLD: 19 % — HIGH (ref 11.5–14.5)
SODIUM SERPL-SCNC: 140 MMOL/L — SIGNIFICANT CHANGE UP (ref 135–146)
SODIUM SERPL-SCNC: 140 MMOL/L — SIGNIFICANT CHANGE UP (ref 135–146)
TIBC SERPL-MCNC: 388 UG/DL — SIGNIFICANT CHANGE UP (ref 220–430)
TRIGL SERPL-MCNC: 81 MG/DL — SIGNIFICANT CHANGE UP
UIBC SERPL-MCNC: 371 UG/DL — HIGH (ref 110–370)
WBC # BLD: 4.33 K/UL — LOW (ref 4.8–10.8)
WBC # BLD: 4.54 K/UL — LOW (ref 4.8–10.8)
WBC # FLD AUTO: 4.33 K/UL — LOW (ref 4.8–10.8)
WBC # FLD AUTO: 4.54 K/UL — LOW (ref 4.8–10.8)

## 2025-05-20 PROCEDURE — 99233 SBSQ HOSP IP/OBS HIGH 50: CPT

## 2025-05-20 PROCEDURE — 99223 1ST HOSP IP/OBS HIGH 75: CPT

## 2025-05-20 PROCEDURE — 71275 CT ANGIOGRAPHY CHEST: CPT | Mod: 26

## 2025-05-20 PROCEDURE — 74174 CTA ABD&PLVS W/CONTRAST: CPT | Mod: 26

## 2025-05-20 RX ORDER — SODIUM CHLORIDE 9 G/1000ML
1000 INJECTION, SOLUTION INTRAVENOUS
Refills: 0 | Status: DISCONTINUED | OUTPATIENT
Start: 2025-05-20 | End: 2025-05-21

## 2025-05-20 RX ORDER — MONTELUKAST SODIUM 10 MG/1
10 TABLET ORAL DAILY
Refills: 0 | Status: DISCONTINUED | OUTPATIENT
Start: 2025-05-20 | End: 2025-05-21

## 2025-05-20 RX ORDER — IRON SUCROSE 20 MG/ML
200 INJECTION, SOLUTION INTRAVENOUS
Refills: 0 | Status: DISCONTINUED | OUTPATIENT
Start: 2025-05-20 | End: 2025-05-21

## 2025-05-20 RX ORDER — BISACODYL 5 MG
20 TABLET, DELAYED RELEASE (ENTERIC COATED) ORAL ONCE
Refills: 0 | Status: COMPLETED | OUTPATIENT
Start: 2025-05-20 | End: 2025-05-20

## 2025-05-20 RX ORDER — METOPROLOL SUCCINATE 50 MG/1
12.5 TABLET, EXTENDED RELEASE ORAL
Refills: 0 | Status: DISCONTINUED | OUTPATIENT
Start: 2025-05-20 | End: 2025-05-21

## 2025-05-20 RX ORDER — TAMSULOSIN HYDROCHLORIDE 0.4 MG/1
0.4 CAPSULE ORAL AT BEDTIME
Refills: 0 | Status: DISCONTINUED | OUTPATIENT
Start: 2025-05-20 | End: 2025-05-21

## 2025-05-20 RX ORDER — POLYETHYLENE GLYCOL-3350 AND ELECTROLYTES 236; 6.74; 5.86; 2.97; 22.74 G/274.31G; G/274.31G; G/274.31G; G/274.31G; G/274.31G
4000 POWDER, FOR SOLUTION ORAL ONCE
Refills: 0 | Status: COMPLETED | OUTPATIENT
Start: 2025-05-20 | End: 2025-05-20

## 2025-05-20 RX ORDER — ATORVASTATIN CALCIUM 80 MG/1
20 TABLET, FILM COATED ORAL AT BEDTIME
Refills: 0 | Status: DISCONTINUED | OUTPATIENT
Start: 2025-05-20 | End: 2025-05-21

## 2025-05-20 RX ADMIN — POLYETHYLENE GLYCOL-3350 AND ELECTROLYTES 4000 MILLILITER(S): 236; 6.74; 5.86; 2.97; 22.74 POWDER, FOR SOLUTION ORAL at 17:37

## 2025-05-20 RX ADMIN — SODIUM CHLORIDE 70 MILLILITER(S): 9 INJECTION, SOLUTION INTRAVENOUS at 02:39

## 2025-05-20 RX ADMIN — METOPROLOL SUCCINATE 12.5 MILLIGRAM(S): 50 TABLET, EXTENDED RELEASE ORAL at 06:41

## 2025-05-20 RX ADMIN — MONTELUKAST SODIUM 10 MILLIGRAM(S): 10 TABLET ORAL at 12:14

## 2025-05-20 RX ADMIN — Medication 1 APPLICATION(S): at 05:08

## 2025-05-20 RX ADMIN — TAMSULOSIN HYDROCHLORIDE 0.4 MILLIGRAM(S): 0.4 CAPSULE ORAL at 21:12

## 2025-05-20 RX ADMIN — Medication 20 MILLIGRAM(S): at 21:12

## 2025-05-20 RX ADMIN — IRON SUCROSE 200 MILLIGRAM(S): 20 INJECTION, SOLUTION INTRAVENOUS at 12:14

## 2025-05-20 RX ADMIN — Medication 40 MILLIGRAM(S): at 17:03

## 2025-05-20 RX ADMIN — ATORVASTATIN CALCIUM 20 MILLIGRAM(S): 80 TABLET, FILM COATED ORAL at 21:12

## 2025-05-20 RX ADMIN — Medication 40 MILLIGRAM(S): at 02:39

## 2025-05-20 NOTE — PROGRESS NOTE ADULT - ASSESSMENT
Assessment	    The pt is a Hakka speaking 78 y/o man with a PMHx of HTN, CAD, s/p CABG 2015, AF on Eliquis, HFimpEF, severe LAE/EARL, mi-mod aortic regurg, h/o aortic dissection s/p repair and h/o GIB who presents to the ED for ongoing chest pain for the past 2 days that began at rest and is associated with dizziness. The pt was admitted for further workup of his chest pain.    Plan  #chest pain with dizziness  #likely symptomatic anemia  - Troponin negative   - EKG shows afib rate controlled with QTc 518  - Possible that the pt has dizziness from symptomatic anemia, microcytic  - F/u iron studies  -Type and screen and transfuse one unit  - NPO and PPI IV BID  - GI consult  -Fu TTE  -dc tele    #h/o GIB  #H/o Syncope  - EGD 2024---A 1cm clean-based non-bleeding Sutter Class III ulcer was noted at the level  of the antrum. Multiple cold forceps biopsies were obtained from the edges of  the ulcer for H pylori testing.   A 5mm clean-based non-bleeding Sutter Class III ulcer was noted at the level  of the antrum.   A large non-bleeding diverticulum was noted in the duodenal bulb. The  remaining mucosa was otherwise unremarkable.  - Pt was supposed to follow up with Gi for colonoscopy but he did not  -IV iron 200 mg five doses    #AF on Eliquis  - Rate controlled  - Hold for now   - Should be on meto but pt not taking, Will start Tart 12.5 BID and increase as tolerated.    #HTN  #CAD, s/p CABG 2015  #HFimpEF, severe LAE/EARL, mi-mod aortic regurg   #h/o aortic dissection s/p repair  - Hold entresto for now  - CT chest with IV contrast to eval type b dissection      DVt ppx- Hold for now  GI ppx PPI IV  Diet. NPO for possible EGD  ambulate as tolerated

## 2025-05-20 NOTE — H&P ADULT - NSHPLABSRESULTS_GEN_ALL_CORE
LABS:                          7.9    5.70  )-----------( 188      ( 19 May 2025 21:11 )             26.2     05-19    137  |  101  |  14  ----------------------------<  111[H]  3.7   |  22  |  1.1    Ca    8.4      19 May 2025 21:11    TPro  6.7  /  Alb  4.4  /  TBili  0.6  /  DBili  x   /  AST  17  /  ALT  11  /  AlkPhos  58  05-19

## 2025-05-20 NOTE — CONSULT NOTE ADULT - ATTENDING COMMENTS
80yo male with anemia and reported dark stools. Prior EGD results noted showing antral ulcers. Continue PPI, hold eliquis if no c/i. Plan for EGD and colonoscopy.

## 2025-05-20 NOTE — H&P ADULT - NSHPPHYSICALEXAM_GEN_ALL_CORE
SKIN: Warm dry  HEAD: NCAT  EYES: NL inspection  ENT: MMM  NECK: Supple; non tender.  CARD: RRR  RESP: No respiratory distress, lung sounds clear bilaterally  ABD: S/NT no R/G  EXT: no pedal edema, no calf tenderness  NEURO: Grossly unremarkable  PSYCH: Cooperative, appropriate SKIN: Warm dry  HEAD: NCAT  EYES: NL inspection  ENT: MMM  NECK: Supple; non tender.  CARD: RRR  RESP: No respiratory distress, lung sounds clear bilaterally  ABD: S/NT no R/G,   EXT: no pedal edema, no calf tenderness  NEURO: Grossly unremarkable  PSYCH: Cooperative, appropriate

## 2025-05-20 NOTE — H&P ADULT - HISTORY OF PRESENT ILLNESS
The pt is a Hak[place] speaking 80 y/o man with a PMHx of HTN, CAD, s/p CABG 2015, AF on Eliquis, HFimpEF, severe LAE/EARL, mi-mod aortic regurg, h/o aortic dissection s/p repair and h/o GIB who presents to the ED for ongoing chest pain for the past 2 days that began at rest and is associated with dizziness. The patient denied having any difficulty breathing, pleuritic chest pain, palpitations, diaphoresis, fever, nausea, vomiting, diarrhea, constipation, abdominal pain, urinary symptoms, headache, weakness, URI symptoms, trauma, sick contacts, recent travel, rash, bloody bowel movements or hematemesis.    In the ED,  Vital Signs Last 24 Hrs  T(C): 36.6 (20 May 2025 00:46), Max: 36.7 (19 May 2025 19:41)  T(F): 97.9 (20 May 2025 00:46), Max: 98 (19 May 2025 19:41)  HR: 65 (20 May 2025 00:46) (65 - 71)  BP: 145/91 (20 May 2025 00:46) (137/68 - 145/91)  BP(mean): --  RR: 18 (20 May 2025 00:46) (18 - 18)  SpO2: 100% (20 May 2025 00:46) (97% - 100%)    Parameters below as of 19 May 2025 19:41  Patient On (Oxygen Delivery Method): room air    Labs significant for Hb of 7.9 (baseline 8-9), microcytic, troponin of 16    CXR showed hazy opacities The pt is a Hakka speaking 80 y/o man with a PMHx of HTN, CAD, s/p CABG 2015, AF on Eliquis, HFimpEF, severe LAE/EARL, mi-mod aortic regurg, h/o aortic dissection s/p repair and h/o GIB who presents to the ED for ongoing chest pain for the past 2 days that began at rest and is associated with dizziness. The pt states that a few days back he had black stools and visited his PCP who gave him some pills. The patient denied having any difficulty breathing, pleuritic chest pain, palpitations, diaphoresis, fever, nausea, vomiting, diarrhea, constipation, abdominal pain, urinary symptoms, headache, weakness, URI symptoms, trauma, sick contacts, recent travel, rash, bloody bowel movements or hematemesis.    In the ED,  Vital Signs Last 24 Hrs  T(C): 36.6 (20 May 2025 00:46), Max: 36.7 (19 May 2025 19:41)  T(F): 97.9 (20 May 2025 00:46), Max: 98 (19 May 2025 19:41)  HR: 65 (20 May 2025 00:46) (65 - 71)  BP: 145/91 (20 May 2025 00:46) (137/68 - 145/91)  BP(mean): --  RR: 18 (20 May 2025 00:46) (18 - 18)  SpO2: 100% (20 May 2025 00:46) (97% - 100%)    Parameters below as of 19 May 2025 19:41  Patient On (Oxygen Delivery Method): room air    Labs significant for Hb of 7.9 (baseline 8-9), microcytic, troponin of 16    CXR showed hazy opacities

## 2025-05-20 NOTE — PROGRESS NOTE ADULT - SUBJECTIVE AND OBJECTIVE BOX
YAMILE DU  80y  Male      Patient is a 80y old  Male who presents with a chief complaint of Chest pain (20 May 2025 00:52)      INTERVAL HPI/OVERNIGHT EVENTS:    No events overnight    REVIEW OF SYSTEMS:    negative    Vital Signs Last 24 Hrs  T(C): 36.7 (20 May 2025 13:01), Max: 36.8 (20 May 2025 05:12)  T(F): 98 (20 May 2025 13:01), Max: 98.2 (20 May 2025 05:12)  HR: 78 (20 May 2025 13:01) (65 - 78)  BP: 129/73 (20 May 2025 13:01) (128/77 - 145/91)  BP(mean): 111 (20 May 2025 05:12) (94 - 111)  RR: 18 (20 May 2025 13:01) (17 - 18)  SpO2: 99% (20 May 2025 05:12) (97% - 100%)    Parameters below as of 20 May 2025 04:50  Patient On (Oxygen Delivery Method): room air        PHYSICAL EXAM:    HEAD: NCAT  EYES: NL inspection  ENT: MMM  NECK: Supple; non tender.  CARD: RRR  RESP: No respiratory distress, lung sounds clear bilaterally  ABD: S/NT no R/G,   EXT: no pedal edema, no calf tenderness  NEURO: Grossly unremarkable  PSYCH: Cooperative, appropriate    Consultant(s) Notes Reviewed:  [x ] YES  [ ] NO  Care Discussed with Consultants/Other Providers [ x] YES  [ ] NO    LABS:                        8.3    4.33  )-----------( 165      ( 20 May 2025 09:50 )             26.5     05-20    140  |  106  |  11  ----------------------------<  96  3.8   |  23  |  1.1    Ca    8.1[L]      20 May 2025 09:50  Mg     2.1     05-20    TPro  6.0  /  Alb  3.7  /  TBili  0.8  /  DBili  x   /  AST  14  /  ALT  10  /  AlkPhos  50  05-20      Urinalysis Basic - ( 20 May 2025 09:50 )    Color: x / Appearance: x / SG: x / pH: x  Gluc: 96 mg/dL / Ketone: x  / Bili: x / Urobili: x   Blood: x / Protein: x / Nitrite: x   Leuk Esterase: x / RBC: x / WBC x   Sq Epi: x / Non Sq Epi: x / Bacteria: x        CAPILLARY BLOOD GLUCOSE      POCT Blood Glucose.: 120 mg/dL (19 May 2025 21:01)  POCT Blood Glucose.: 122 mg/dL (19 May 2025 19:54)      RADIOLOGY & ADDITIONAL TESTS:    Imaging Personally Reviewed:  [ ] YES  [ ] NO

## 2025-05-20 NOTE — PROGRESS NOTE ADULT - ATTENDING COMMENTS
80 y/o man with a PMHx of HTN, CAD, s/p CABG 2015, AF on Eliquis, HFimpEF, severe LAE/EARL, mi-mod aortic regurg, h/o aortic dissection s/p repair and h/o GIB who presents to the ED for ongoing chest pain for the past 2 days that began at rest and is associated with dizziness. The pt states that a few days back he had black stools and visited his PCP who gave him some pills. Admitted for suspected melena and symptomatic anemia.    suspected UGIB / Melena   Anemia 2/2 acute blood loss   - Hgb 7.9 on admission  - s/p 1 unit pRBC  - iron studies > HUMAIRA  - GI consulted, plan for EGD/ colonoscopy 5/21/25, start bowel prep  - IV iron x5 doses    Atypical CP /Dyspnea - most likely symptomatic anemia   CAD s/p CABG   HTN  H/o chronic A. fib on Eliquis  - EKG afib, non ischemic  - Trops neg  - hold AC, last dose 5/19  - f/u TTE    DVT ppx: SCDs  GI ppx: PPI  Full Code    Plan for EGD/colonoscopy 5/21 for suspected GI bleed

## 2025-05-20 NOTE — CONSULT NOTE ADULT - ASSESSMENT
80 y/o man with a PMHx of HTN, CAD, s/p CABG 2015, AF on Eliquis, HFimpEF, severe LAE/EARL, mi-mod aortic regurg, h/o aortic dissection s/p repair and h/o GIB who presents to the ED for ongoing chest pain for the past 2 days that began at rest and is associated with dizziness. GI consulted for evaluation of anemia and black stools.     #Chronic iron deficiency anemia / reported dark stools  #Hx PUD  - Hemodynamically stable & no active bleeding  - JESSICA negative, brown stool  - Baseline hemoglobin - 8  - Hemoglobin on admission - 7.9  - Coags - pending  - Patient on eliquis (last dose 5/19AM)  - EGD 2024: Normal esophagus. erosive gastritis. 1cm [place] Class III antrum ulcer. 5mm [place] Class III antrum ulcer. Large diverticulum in the duodenal bulb  - Was recommended colonoscopy but did not pursue    #Rec  - EGD/colonoscopy tomorrow   - Clear liquid diet. NPO after midnight  - Please start Golytely @4PM to completion and dulcolax 20mg @9PM evening before procedure   - Please obtain preop labs (coags, CBC, CMP, mag) night before procedure   - Maintain active Type and screen  - Trend H&H   - pantoprazole 40mg qd  - Please correct INR to <1.5  - Please target Hb  >7  - Please avoid any NSAIDs    #liver cyst  - OP hepatology follow up

## 2025-05-20 NOTE — H&P ADULT - ASSESSMENT
Assessment  The pt is a Hakka speaking 78 y/o man with a PMHx of HTN, CAD, s/p CABG 2015, AF on Eliquis, HFimpEF, severe LAE/EARL, mi-mod aortic regurg, h/o aortic dissection s/p repair and h/o GIB who presents to the ED for ongoing chest pain for the past 2 days that began at rest and is associated with dizziness. The pt was admitted for further workup of his chest pain.    Plan  #chest pain with dizziness  #stable angina vs symptomatic anemia  - Troponin negative   - EKG shows afib rate controlled with QTc 518  - NPO for nuclear stress test tomorrow  - Possible that the pt has dizziness from symptomatic anemia, microcytic  - F/u iron studies in AM   - Type and screen in AM, if CBC <8 on repeat labs then transfuse a unit    #h/o GIB  #H/o Syncope  - EGD 2024---A 1cm clean-based non-bleeding [place] Class III ulcer was noted at the level  of the antrum. Multiple cold forceps biopsies were obtained from the edges of  the ulcer for H pylori testing.   A 5mm clean-based non-bleeding [place] Class III ulcer was noted at the level  of the antrum.   A large non-bleeding diverticulum was noted in the duodenal bulb. The  remaining mucosa was otherwise unremarkable.  - Pt was supposed to follow up with Gi for colonoscopy but he did not    #AF on Eliquis  - Rate controlled  - Lovenox for now    #HTN  #CAD, s/p CABG 2015  #HFimpEF, severe LAE/EARL, mi-mod aortic regurg   #h/o aortic dissection s/p repair  - continue home meds: Entresto 26mg, Atorvastatin 20mg     Assessment  The pt is a Hakka speaking 78 y/o man with a PMHx of HTN, CAD, s/p CABG 2015, AF on Eliquis, HFimpEF, severe LAE/EARL, mi-mod aortic regurg, h/o aortic dissection s/p repair and h/o GIB who presents to the ED for ongoing chest pain for the past 2 days that began at rest and is associated with dizziness. The pt was admitted for further workup of his chest pain.    Plan  #chest pain with dizziness  #stable angina vs symptomatic anemia  - Troponin negative   - EKG shows afib rate controlled with QTc 518  - NPO for nuclear stress test tomorrow  - Possible that the pt has dizziness from symptomatic anemia, microcytic  - F/u iron studies in AM   - Type and screen in AM, if CBC <8 on repeat labs then transfuse a unit    #h/o GIB  #H/o Syncope  - EGD 2024---A 1cm clean-based non-bleeding [place] Class III ulcer was noted at the level  of the antrum. Multiple cold forceps biopsies were obtained from the edges of  the ulcer for H pylori testing.   A 5mm clean-based non-bleeding [place] Class III ulcer was noted at the level  of the antrum.   A large non-bleeding diverticulum was noted in the duodenal bulb. The  remaining mucosa was otherwise unremarkable.  - Pt was supposed to follow up with Gi for colonoscopy but he did not    #AF on Eliquis  - Rate controlled  - Hold for now and resume after CT chest with IV contrast to eval dissection and aneurysm    #HTN  #CAD, s/p CABG 2015  #HFimpEF, severe LAE/EARL, mi-mod aortic regurg   #h/o aortic dissection s/p repair  - continue home meds: Entresto 26mg, Atorvastatin 20mg  - CT chest with IV contrast to eval type b dissection      DVt ppx- Hold for now  GI ppx none  Diet. NPO for stress test  ambulate as tolerated    Albuterol (Eqv-ProAir HFA) 90 mcg/inh inhalation aerosol: 2 puff(s) inhaled every 6 hours as needed for  shortness of breath and/or wheezing  atorvastatin 20 mg oral tablet: 1 tab(s) orally once a day  benzonatate 100 mg oral capsule: 2 cap(s) orally 3 times a day as needed for  cough  Eliquis 5 mg oral tablet: 1 tab(s) orally 2 times a day  Entresto 24 mg-26 mg oral tablet: 1 tab(s) orally once a day  ferrous sulfate 325 mg (65 mg elemental iron) oral tablet: 1 tab(s) orally once a day  Flomax 0.4 mg oral capsule: 1 cap(s) orally once a day (at bedtime)   meclizine 25 mg oral tablet: 1 tab(s) orally once a day  montelukast 10 mg oral tablet: 1 tab(s) orally once a day  pantoprazole 40 mg oral delayed release tablet: 1 tab(s) orally 2 times a day   Assessment  The pt is a Hakka speaking 78 y/o man with a PMHx of HTN, CAD, s/p CABG 2015, AF on Eliquis, HFimpEF, severe LAE/EARL, mi-mod aortic regurg, h/o aortic dissection s/p repair and h/o GIB who presents to the ED for ongoing chest pain for the past 2 days that began at rest and is associated with dizziness. The pt was admitted for further workup of his chest pain.    Plan  #chest pain with dizziness  #likely symptomatic anemia  - Troponin negative   - EKG shows afib rate controlled with QTc 518  - Possible that the pt has dizziness from symptomatic anemia, microcytic  - F/u iron studies  -Type and screen and transfuse one unit  - NPO and PPI IV BID  - GI consult    #h/o GIB  #H/o Syncope  - EGD 2024---A 1cm clean-based non-bleeding [place] Class III ulcer was noted at the level  of the antrum. Multiple cold forceps biopsies were obtained from the edges of  the ulcer for H pylori testing.   A 5mm clean-based non-bleeding [place] Class III ulcer was noted at the level  of the antrum.   A large non-bleeding diverticulum was noted in the duodenal bulb. The  remaining mucosa was otherwise unremarkable.  - Pt was supposed to follow up with Gi for colonoscopy but he did not    #AF on Eliquis  - Rate controlled  - Hold for now     #HTN  #CAD, s/p CABG 2015  #HFimpEF, severe LAE/EARL, mi-mod aortic regurg   #h/o aortic dissection s/p repair  - continue home meds: Entresto 26mg, Atorvastatin 20mg  - CT chest with IV contrast to eval type b dissection      DVt ppx- Hold for now  GI ppx none  Diet. NPO for possible EGD  ambulate as tolerated       Assessment  The pt is a Hak[place] speaking 78 y/o man with a PMHx of HTN, CAD, s/p CABG 2015, AF on Eliquis, HFimpEF, severe LAE/EARL, mi-mod aortic regurg, h/o aortic dissection s/p repair and h/o GIB who presents to the ED for ongoing chest pain for the past 2 days that began at rest and is associated with dizziness. The pt was admitted for further workup of his chest pain.    Plan  #chest pain with dizziness  #likely symptomatic anemia  - Troponin negative   - EKG shows afib rate controlled with QTc 518  - Possible that the pt has dizziness from symptomatic anemia, microcytic  - F/u iron studies  -Type and screen and transfuse one unit  - NPO and PPI IV BID  - GI consult    #h/o GIB  #H/o Syncope  - EGD 2024---A 1cm clean-based non-bleeding [place] Class III ulcer was noted at the level  of the antrum. Multiple cold forceps biopsies were obtained from the edges of  the ulcer for H pylori testing.   A 5mm clean-based non-bleeding [place] Class III ulcer was noted at the level  of the antrum.   A large non-bleeding diverticulum was noted in the duodenal bulb. The  remaining mucosa was otherwise unremarkable.  - Pt was supposed to follow up with Gi for colonoscopy but he did not    #AF on Eliquis  - Rate controlled  - Hold for now   - Should be on meto but pt not taking, Will start Tart 12.5 BID and increase as tolerated.    #HTN  #CAD, s/p CABG 2015  #HFimpEF, severe LAE/EARL, mi-mod aortic regurg   #h/o aortic dissection s/p repair  - continue home meds: Entresto 26mg, Atorvastatin 20mg  - CT chest with IV contrast to eval type b dissection      DVt ppx- Hold for now  GI ppx none  Diet. NPO for possible EGD  ambulate as tolerated       Assessment  The pt is a Hak[place] speaking 80 y/o man with a PMHx of HTN, CAD, s/p CABG 2015, AF on Eliquis, HFimpEF, severe LAE/EARL, mi-mod aortic regurg, h/o aortic dissection s/p repair and h/o GIB who presents to the ED for ongoing chest pain for the past 2 days that began at rest and is associated with dizziness. The pt was admitted for further workup of his chest pain.    Plan  #chest pain with dizziness  #likely symptomatic anemia  - Troponin negative   - EKG shows afib rate controlled with QTc 518  - Possible that the pt has dizziness from symptomatic anemia, microcytic  - F/u iron studies  -Type and screen and transfuse one unit  - NPO and PPI IV BID  - GI consult    #h/o GIB  #H/o Syncope  - EGD 2024---A 1cm clean-based non-bleeding [place] Class III ulcer was noted at the level  of the antrum. Multiple cold forceps biopsies were obtained from the edges of  the ulcer for H pylori testing.   A 5mm clean-based non-bleeding [place] Class III ulcer was noted at the level  of the antrum.   A large non-bleeding diverticulum was noted in the duodenal bulb. The  remaining mucosa was otherwise unremarkable.  - Pt was supposed to follow up with Gi for colonoscopy but he did not    #AF on Eliquis  - Rate controlled  - Hold for now   - Should be on meto but pt not taking, Will start Tart 12.5 BID and increase as tolerated.    #HTN  #CAD, s/p CABG 2015  #HFimpEF, severe LAE/EARL, mi-mod aortic regurg   #h/o aortic dissection s/p repair  - Hold entresto for now  - CT chest with IV contrast to eval type b dissection      DVt ppx- Hold for now  GI ppx none  Diet. NPO for possible EGD  ambulate as tolerated       Assessment  The pt is a Hak[place] speaking 78 y/o man with a PMHx of HTN, CAD, s/p CABG 2015, AF on Eliquis, HFimpEF, severe LAE/EARL, mi-mod aortic regurg, h/o aortic dissection s/p repair and h/o GIB who presents to the ED for ongoing chest pain for the past 2 days that began at rest and is associated with dizziness. The pt was admitted for further workup of his chest pain.    Plan  #chest pain with dizziness  #likely symptomatic anemia  - Troponin negative   - EKG shows afib rate controlled with QTc 518  - Possible that the pt has dizziness from symptomatic anemia, microcytic  - F/u iron studies  -Type and screen and transfuse one unit  - NPO and PPI IV BID  - GI consult    #h/o GIB  #H/o Syncope  - EGD 2024---A 1cm clean-based non-bleeding [place] Class III ulcer was noted at the level  of the antrum. Multiple cold forceps biopsies were obtained from the edges of  the ulcer for H pylori testing.   A 5mm clean-based non-bleeding [place] Class III ulcer was noted at the level  of the antrum.   A large non-bleeding diverticulum was noted in the duodenal bulb. The  remaining mucosa was otherwise unremarkable.  - Pt was supposed to follow up with Gi for colonoscopy but he did not    #AF on Eliquis  - Rate controlled  - Hold for now   - Should be on meto but pt not taking, Will start Tart 12.5 BID and increase as tolerated.    #HTN  #CAD, s/p CABG 2015  #HFimpEF, severe LAE/EARL, mi-mod aortic regurg   #h/o aortic dissection s/p repair  - Hold entresto for now  - CT chest with IV contrast to eval type b dissection      DVt ppx- Hold for now  GI ppx PPI IV  Diet. NPO for possible EGD  ambulate as tolerated

## 2025-05-20 NOTE — H&P ADULT - NSCORESITESY/N_GEN_A_CORE_RD
Patient Seen in: [facility]      History   Patient presents with:  Covid-19 Test  Nausea/Vomiting/Diarrhea  Body ache and/or chills    Stated Complaint: COVID TEST    Subjective:   HPI  22-year-old male who is not Covid vaccinated present No tenderness. Skin:     General: Skin is warm and dry. Neurological:      Mental Status: He is alert and oriented to person, place, and time.            ED Course     Labs Reviewed   RAPID SARS-COV-2 BY PCR - Normal                   MDM    Covid test is

## 2025-05-20 NOTE — H&P ADULT - ATTENDING COMMENTS
HPI:  The pt is a Hakka speaking 78 y/o man with a PMHx of HTN, CAD, s/p CABG 2015, AF on Eliquis, HFimpEF, severe LAE/EARL, mi-mod aortic regurg, h/o aortic dissection s/p repair and h/o GIB who presents to the ED for ongoing chest pain for the past 2 days that began at rest and is associated with dizziness. The pt states that a few days back he had black stools and visited his PCP who gave him some pills. The patient denied having any difficulty breathing, pleuritic chest pain, palpitations, diaphoresis, fever, nausea, vomiting, diarrhea, constipation, abdominal pain, urinary symptoms, headache, weakness, URI symptoms, trauma, sick contacts, recent travel, rash, bloody bowel movements or hematemesis.    In the ED,  Vital Signs Last 24 Hrs  T(C): 36.6 (20 May 2025 00:46), Max: 36.7 (19 May 2025 19:41)  T(F): 97.9 (20 May 2025 00:46), Max: 98 (19 May 2025 19:41)  HR: 65 (20 May 2025 00:46) (65 - 71)  BP: 145/91 (20 May 2025 00:46) (137/68 - 145/91)  BP(mean): --  RR: 18 (20 May 2025 00:46) (18 - 18)  SpO2: 100% (20 May 2025 00:46) (97% - 100%)    Parameters below as of 19 May 2025 19:41  Patient On (Oxygen Delivery Method): room air    Labs significant for Hb of 7.9 (baseline 8-9), microcytic, troponin of 16    CXR showed hazy opacities (20 May 2025 00:52)    REVIEW OF SYSTEMS: see cc/HPI   CONSTITUTIONAL: No weakness, fevers or chills  EYES/ENT: No visual changes;  No vertigo or throat pain   NECK: No pain or stiffness  RESPIRATORY: No cough, wheezing, hemoptysis; (+) shortness of breath/PARR  CARDIOVASCULAR: (+) chest pain or palpitations  GASTROINTESTINAL: (+) abdominal -epigastric pain. No nausea, vomiting, or hematemesis; No diarrhea or constipation. (+) melena No hematochezia.  GENITOURINARY: No dysuria, frequency or hematuria  NEUROLOGICAL: No numbness or weakness (+) dizziness  SKIN: No itching, rashes  ENDO: No hyperglycemia, No thyroid disorder, No dyslipidemia   HEM: No bleeding, No easy bruising, No anemia   PSYCHE: No psychosis, No mood disorder No hallucinations No delusion   MSK: No deformity, No fracture, No Joint swelling    Physical Exam:  General: WN/WD NAD  Neurology: A&Ox3, nonfocal, follows commands  Eyes: PERRLA/ EOMI  ENT/Neck: Neck supple, trachea midline, No JVD  Respiratory: CTA B/L, No wheezing, rales, rhonchi  CV: Normal rate regular rhythm, S1S2, (+)  systolic II/VI murmurs, No rubs or gallops  Abdominal: Soft, NT, ND +BS,   Extremities: No edema, + peripheral pulses  Skin: No Rashes, Hematoma, Ecchymosis    A/p  suspected UGIB / Melena   Anemia 2/2 acute blood loss   -NPO / IV fluids  -IV PPI   -serial CBC / Active T&S    Atypical CP /Dyspnea - most likely symptomatic anemia   CAD s/p CABG   HTN  H/o chronic A. fib on Eliquis  -agree w/ holding Eliquis  -serial CBC / Active T&S   -NO DAPT  -serial Trop   -2D echo   -agree w/ CT chest r/o dissection     DVT prophylaxis - SCD to  Bilateral LEs

## 2025-05-21 ENCOUNTER — TRANSCRIPTION ENCOUNTER (OUTPATIENT)
Age: 80
End: 2025-05-21

## 2025-05-21 ENCOUNTER — RESULT REVIEW (OUTPATIENT)
Age: 80
End: 2025-05-21

## 2025-05-21 VITALS
DIASTOLIC BLOOD PRESSURE: 68 MMHG | SYSTOLIC BLOOD PRESSURE: 121 MMHG | TEMPERATURE: 98 F | HEART RATE: 66 BPM | RESPIRATION RATE: 18 BRPM

## 2025-05-21 LAB
ALBUMIN SERPL ELPH-MCNC: 3.8 G/DL — SIGNIFICANT CHANGE UP (ref 3.5–5.2)
ALP SERPL-CCNC: 54 U/L — SIGNIFICANT CHANGE UP (ref 30–115)
ALT FLD-CCNC: 10 U/L — SIGNIFICANT CHANGE UP (ref 0–41)
ANION GAP SERPL CALC-SCNC: 12 MMOL/L — SIGNIFICANT CHANGE UP (ref 7–14)
AST SERPL-CCNC: 14 U/L — SIGNIFICANT CHANGE UP (ref 0–41)
BILIRUB SERPL-MCNC: 0.9 MG/DL — SIGNIFICANT CHANGE UP (ref 0.2–1.2)
BUN SERPL-MCNC: 13 MG/DL — SIGNIFICANT CHANGE UP (ref 10–20)
CALCIUM SERPL-MCNC: 8.1 MG/DL — LOW (ref 8.4–10.5)
CHLORIDE SERPL-SCNC: 107 MMOL/L — SIGNIFICANT CHANGE UP (ref 98–110)
CO2 SERPL-SCNC: 24 MMOL/L — SIGNIFICANT CHANGE UP (ref 17–32)
CREAT SERPL-MCNC: 1 MG/DL — SIGNIFICANT CHANGE UP (ref 0.7–1.5)
EGFR: 77 ML/MIN/1.73M2 — SIGNIFICANT CHANGE UP
EGFR: 77 ML/MIN/1.73M2 — SIGNIFICANT CHANGE UP
GLUCOSE SERPL-MCNC: 90 MG/DL — SIGNIFICANT CHANGE UP (ref 70–99)
HCT VFR BLD CALC: 25 % — LOW (ref 42–52)
HGB BLD-MCNC: 7.9 G/DL — LOW (ref 14–18)
MAGNESIUM SERPL-MCNC: 2.3 MG/DL — SIGNIFICANT CHANGE UP (ref 1.8–2.4)
MCHC RBC-ENTMCNC: 23.8 PG — LOW (ref 27–31)
MCHC RBC-ENTMCNC: 31.6 G/DL — LOW (ref 32–37)
MCV RBC AUTO: 75.3 FL — LOW (ref 80–94)
NRBC BLD AUTO-RTO: 0 /100 WBCS — SIGNIFICANT CHANGE UP (ref 0–0)
PLATELET # BLD AUTO: 163 K/UL — SIGNIFICANT CHANGE UP (ref 130–400)
PMV BLD: 9.7 FL — SIGNIFICANT CHANGE UP (ref 7.4–10.4)
POTASSIUM SERPL-MCNC: 3.6 MMOL/L — SIGNIFICANT CHANGE UP (ref 3.5–5)
POTASSIUM SERPL-SCNC: 3.6 MMOL/L — SIGNIFICANT CHANGE UP (ref 3.5–5)
PROT SERPL-MCNC: 5.7 G/DL — LOW (ref 6–8)
RBC # BLD: 3.32 M/UL — LOW (ref 4.7–6.1)
RBC # BLD: 3.32 M/UL — LOW (ref 4.7–6.1)
RBC # FLD: 18.6 % — HIGH (ref 11.5–14.5)
RETICS #: 80 K/UL — SIGNIFICANT CHANGE UP (ref 25–125)
RETICS/RBC NFR: 2.4 % — HIGH (ref 0.5–1.5)
SODIUM SERPL-SCNC: 143 MMOL/L — SIGNIFICANT CHANGE UP (ref 135–146)
WBC # BLD: 3.56 K/UL — LOW (ref 4.8–10.8)
WBC # FLD AUTO: 3.56 K/UL — LOW (ref 4.8–10.8)

## 2025-05-21 PROCEDURE — 93306 TTE W/DOPPLER COMPLETE: CPT | Mod: 26

## 2025-05-21 PROCEDURE — 45378 DIAGNOSTIC COLONOSCOPY: CPT

## 2025-05-21 PROCEDURE — 88312 SPECIAL STAINS GROUP 1: CPT | Mod: 26

## 2025-05-21 PROCEDURE — 43239 EGD BIOPSY SINGLE/MULTIPLE: CPT

## 2025-05-21 PROCEDURE — 99239 HOSP IP/OBS DSCHRG MGMT >30: CPT

## 2025-05-21 PROCEDURE — 88305 TISSUE EXAM BY PATHOLOGIST: CPT | Mod: 26

## 2025-05-21 RX ORDER — METOPROLOL SUCCINATE 50 MG/1
0.5 TABLET, EXTENDED RELEASE ORAL
Qty: 30 | Refills: 0
Start: 2025-05-21 | End: 2025-06-19

## 2025-05-21 RX ADMIN — MONTELUKAST SODIUM 10 MILLIGRAM(S): 10 TABLET ORAL at 12:15

## 2025-05-21 RX ADMIN — METOPROLOL SUCCINATE 12.5 MILLIGRAM(S): 50 TABLET, EXTENDED RELEASE ORAL at 05:27

## 2025-05-21 RX ADMIN — Medication 40 MILLIGRAM(S): at 05:27

## 2025-05-21 RX ADMIN — IRON SUCROSE 200 MILLIGRAM(S): 20 INJECTION, SOLUTION INTRAVENOUS at 08:10

## 2025-05-21 RX ADMIN — Medication 1 APPLICATION(S): at 05:28

## 2025-05-21 NOTE — DISCHARGE NOTE PROVIDER - NSDCCPCAREPLAN_GEN_ALL_CORE_FT
PRINCIPAL DISCHARGE DIAGNOSIS  Diagnosis: Chest pain at rest  Assessment and Plan of Treatment: Laboratory tests showed that his hemoglobin level was 7.9 (which is lower than his usual range of 8-9), indicating he was anemic, and he had a troponin level of 16. A chest X-ray displayed hazy areas in the lungs.  The patient was evaluated by a gastroenterologist (GI doctor), who recommended an upper endoscopy (EGD) and colonoscopy to investigate the source of his bleeding. Both procedures were performed, and no active bleeding was found. During his hospitalization, he received one unit of packed red blood cells (PRBCs) to treat his anemia. It was also discovered that he was severely iron deficient, so he was started on intravenous (IV) iron to help replenish his iron levels.   After his evaluation, the GI team cleared him for discharge and advised that he follow up with his GI doctor in an outpatient setting. Before leaving, he was advised to take protonix (a medication that reduces stomach acid) at a dosage of 40 mg once daily. If there are any questions about his condition or follow-up care, please let the medical team know. Patient needs to fu on OP with cardiology and with GI for video capsule endoscopy.      SECONDARY DISCHARGE DIAGNOSES  Diagnosis: Elevated troponin  Assessment and Plan of Treatment:

## 2025-05-21 NOTE — DISCHARGE NOTE PROVIDER - CARE PROVIDERS DIRECT ADDRESSES
,[email or web address]

## 2025-05-21 NOTE — DISCHARGE NOTE PROVIDER - CARE PROVIDER_API CALL
Behuria, Supreeti  Cardiology  [address], [address]  [place] [address]  Phone: [phone]  Fax: [phone]  Follow Up Time: 1 week    Lisandro Dumont  Gastroenterology  [facility] [place] [address]  Phone: [phone]  Fax: [phone]  Follow Up Time: 1 week   Behuria, Supreeti  Cardiology  [address], [address]  [place] [address]  Phone: [phone]  Fax: [phone]  Follow Up Time: 1 week    Sanjuana Rojas  Gastroenterology  [address] [place] [address]  Phone: [phone]  Fax: [phone]  Follow Up Time: 1 week

## 2025-05-21 NOTE — DISCHARGE NOTE NURSING/CASE MANAGEMENT/SOCIAL WORK - NSDCPEFALRISK_GEN_ALL_CORE
For information on Fall & Injury Prevention, visit: [email or web address] OR  [email or web address] OR  https://www.cdc.gov/steadi/patient.html

## 2025-05-21 NOTE — DISCHARGE NOTE PROVIDER - PROVIDER TOKENS
PROVIDER:[TOKEN:[[id number]],FOLLOWUP:[1 week]],PROVIDER:[TOKEN:[[id number]],FOLLOWUP:[1 week]] PROVIDER:[TOKEN:[[id number]],FOLLOWUP:[1 week]],PROVIDER:[TOKEN:[[id number]],FOLLOWUP:[1 week]]

## 2025-05-21 NOTE — DISCHARGE NOTE PROVIDER - HOSPITAL COURSE
The pt is a [place] speaking 78 y/o man with a PMHx of HTN, CAD, s/p CABG 2015, AF on Eliquis, HFimpEF, severe LAE/EARL, mi-mod aortic regurg, h/o aortic dissection s/p repair and h/o GIB who presents to the ED for ongoing chest pain for the past 2 days that began at rest and is associated with dizziness. The pt states that a few days back he had black stools and visited his PCP who gave him some pills. The patient denied having any difficulty breathing, pleuritic chest pain, palpitations, diaphoresis, fever, nausea, vomiting, diarrhea, constipation, abdominal pain, urinary symptoms, headache, weakness, URI symptoms, trauma, sick contacts, recent travel, rash, bloody bowel movements or hematemesis.    Labs significant for Hb of 7.9 (baseline 8-9), microcytic, troponin of 16    CXR showed hazy opacities    Patient was seen and assessed by GI, who recommended and EGD and colonoscopy, patient underwent EGD and colonoscopy, where there was no active bleeding. Patient received 1 unit of PRBCs for symptomatic anemia upon admission. Patient was also found to be severely iron deficient, started on IV iron. Patient cleared for dc by GI, should follow up OP with GI. Can be safely discharged on PO protonics 40 QD.    #chest pain with dizziness  #likely symptomatic anemia  - Troponin negative   - EKG shows afib rate controlled with QTc 518  - Adjusted retic count 0.9%, severely iron deficient saturation 4%  -Type and screen and sp transfusion of one unit    #h/o GIB  #H/o Syncope  - EGD 2024---A 1cm clean-based non-bleeding [place] Class III ulcer was noted at the level  of the antrum. Multiple cold forceps biopsies were obtained from the edges of  the ulcer for H pylori testing.   A 5mm clean-based non-bleeding [place] Class III ulcer was noted at the level  of the antrum.   A large non-bleeding diverticulum was noted in the duodenal bulb. The  remaining mucosa was otherwise unremarkable.  - Pt underwent a repeat colonoscopy and EGD which found no significant bleeding  -Fu with GI for VCE    #AF on Eliquis  - Rate controlled  - Hold for now   - Should be on meto but pt not taking, Will start Tart 12.5 BID and increase as tolerated.    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

## 2025-05-21 NOTE — DISCHARGE NOTE PROVIDER - NSDCFUSCHEDAPPT_GEN_ALL_CORE_FT
Harrison Carlton  [facility]  VASCULAR [address] [place] [address] A  Scheduled Appointment: 07/16/2025

## 2025-05-21 NOTE — DISCHARGE NOTE PROVIDER - NSDCMRMEDTOKEN_GEN_ALL_CORE_FT
Albuterol (Eqv-ProAir HFA) 90 mcg/inh inhalation aerosol: 2 puff(s) inhaled every 6 hours as needed for  shortness of breath and/or wheezing  atorvastatin 20 mg oral tablet: 1 tab(s) orally once a day  Eliquis 5 mg oral tablet: 1 tab(s) orally 2 times a day  Entresto 24 mg-26 mg oral tablet: 1 tab(s) orally once a day  ferrous sulfate 325 mg (65 mg elemental iron) oral tablet: 1 tab(s) orally once a day  Flomax 0.4 mg oral capsule: 1 cap(s) orally once a day (at bedtime)   meclizine 25 mg oral tablet: 1 tab(s) orally once a day  Metoprolol Tartrate 25 mg oral tablet: 0.5 tab(s) orally 2 times a day  montelukast 10 mg oral tablet: 1 tab(s) orally once a day  pantoprazole 40 mg oral delayed release tablet: 1 tab(s) orally once a day   Albuterol (Eqv-ProAir HFA) 90 mcg/inh inhalation aerosol: 2 puff(s) inhaled every 6 hours as needed for  shortness of breath and/or wheezing  atorvastatin 20 mg oral tablet: 1 tab(s) orally once a day  Eliquis 5 mg oral tablet: 1 tab(s) orally 2 times a day  Entresto 24 mg-26 mg oral tablet: 1 tab(s) orally once a day  ferrous sulfate 325 mg (65 mg elemental iron) oral tablet: 1 tab(s) orally once a day  Flomax 0.4 mg oral capsule: 1 cap(s) orally once a day (at bedtime)   meclizine 25 mg oral tablet: 1 tab(s) orally once a day  Metoprolol Tartrate 25 mg oral tablet: 0.5 tab(s) orally 2 times a day  montelukast 10 mg oral tablet: 1 tab(s) orally once a day  pantoprazole 40 mg oral delayed release tablet: 1 tab(s) orally 2 times a day

## 2025-05-21 NOTE — DISCHARGE NOTE NURSING/CASE MANAGEMENT/SOCIAL WORK - FINANCIAL ASSISTANCE
[facility] provides services at a reduced cost to those who are determined to be eligible through [facility]’s financial assistance program. Information regarding [facility]’s financial assistance program can be found by going to [email or web address] or by calling [phone].

## 2025-05-23 LAB
SURGICAL PATHOLOGY STUDY: SIGNIFICANT CHANGE UP
SURGICAL PATHOLOGY STUDY: SIGNIFICANT CHANGE UP

## 2025-05-27 ENCOUNTER — NON-APPOINTMENT (OUTPATIENT)
Age: 80
End: 2025-05-27

## 2025-05-28 DIAGNOSIS — Z79.01 LONG TERM (CURRENT) USE OF ANTICOAGULANTS: ICD-10-CM

## 2025-05-28 DIAGNOSIS — I48.91 UNSPECIFIED ATRIAL FIBRILLATION: ICD-10-CM

## 2025-05-28 DIAGNOSIS — R07.9 CHEST PAIN, UNSPECIFIED: ICD-10-CM

## 2025-05-28 DIAGNOSIS — D50.0 IRON DEFICIENCY ANEMIA SECONDARY TO BLOOD LOSS (CHRONIC): ICD-10-CM

## 2025-05-28 DIAGNOSIS — R55 SYNCOPE AND COLLAPSE: ICD-10-CM

## 2025-05-28 DIAGNOSIS — Z95.1 PRESENCE OF AORTOCORONARY BYPASS GRAFT: ICD-10-CM

## 2025-05-28 DIAGNOSIS — K25.9 GASTRIC ULCER, UNSPECIFIED AS ACUTE OR CHRONIC, WITHOUT HEMORRHAGE OR PERFORATION: ICD-10-CM

## 2025-05-28 DIAGNOSIS — I25.10 ATHEROSCLEROTIC HEART DISEASE OF NATIVE CORONARY ARTERY WITHOUT ANGINA PECTORIS: ICD-10-CM

## 2025-05-28 DIAGNOSIS — K57.10 DIVERTICULOSIS OF SMALL INTESTINE WITHOUT PERFORATION OR ABSCESS WITHOUT BLEEDING: ICD-10-CM

## 2025-05-28 DIAGNOSIS — I50.32 CHRONIC DIASTOLIC (CONGESTIVE) HEART FAILURE: ICD-10-CM

## 2025-05-28 DIAGNOSIS — Z87.11 PERSONAL HISTORY OF PEPTIC ULCER DISEASE: ICD-10-CM

## 2025-05-28 DIAGNOSIS — Z79.02 LONG TERM (CURRENT) USE OF ANTITHROMBOTICS/ANTIPLATELETS: ICD-10-CM

## 2025-05-28 DIAGNOSIS — I11.0 HYPERTENSIVE HEART DISEASE WITH HEART FAILURE: ICD-10-CM

## 2025-05-29 NOTE — DISCHARGE NOTE NURSING/CASE MANAGEMENT/SOCIAL WORK - NSDCPEFALRISK_GEN_ALL_CORE
Updated Dexcom G7  and Sensor Rx's to provider Amena Maher per Medicare compliance.     Juan Burch, PharmD  [facility]         For information on Fall & Injury Prevention, visit: [email or web address] OR  [email or web address] OR  https://www.cdc.gov/steadi/patient.html

## 2025-07-04 NOTE — ED ADULT NURSE NOTE - HISTORY OF COVID-19 VACCINATION
Problem: DISCHARGE PLANNING  Goal: Discharge to home or other facility with appropriate resources  Description: INTERVENTIONS:  - Identify barriers to discharge w/patient and caregiver  - Arrange for needed discharge resources and transportation as appropriate  - Identify discharge learning needs (meds, wound care, etc.)  - Arrange for interpretive services to assist at discharge as needed  - Refer to Case Management Department for coordinating discharge planning if the patient needs post-hospital services based on physician/advanced practitioner order or complex needs related to functional status, cognitive ability, or social support system  Outcome: Progressing     Problem: Knowledge Deficit  Goal: Patient/family/caregiver demonstrates understanding of disease process, treatment plan, medications, and discharge instructions  Description: Complete learning assessment and assess knowledge base.  Interventions:  - Provide teaching at level of understanding  - Provide teaching via preferred learning methods  Outcome: Progressing      Yes

## 2025-07-11 ENCOUNTER — APPOINTMENT (OUTPATIENT)
Dept: GASTROENTEROLOGY | Facility: CLINIC | Age: 80
End: 2025-07-11

## 2025-07-16 ENCOUNTER — APPOINTMENT (OUTPATIENT)
Dept: VASCULAR SURGERY | Facility: CLINIC | Age: 80
End: 2025-07-16